# Patient Record
Sex: MALE | Race: WHITE | Employment: STUDENT | URBAN - METROPOLITAN AREA
[De-identification: names, ages, dates, MRNs, and addresses within clinical notes are randomized per-mention and may not be internally consistent; named-entity substitution may affect disease eponyms.]

---

## 2017-01-24 ENCOUNTER — APPOINTMENT (OUTPATIENT)
Dept: ULTRASOUND IMAGING | Age: 22
End: 2017-01-24
Attending: EMERGENCY MEDICINE
Payer: MEDICAID

## 2017-01-24 ENCOUNTER — APPOINTMENT (OUTPATIENT)
Dept: GENERAL RADIOLOGY | Age: 22
End: 2017-01-24
Attending: EMERGENCY MEDICINE
Payer: MEDICAID

## 2017-01-24 ENCOUNTER — HOSPITAL ENCOUNTER (EMERGENCY)
Age: 22
Discharge: HOME | End: 2017-01-24
Attending: EMERGENCY MEDICINE
Payer: MEDICAID

## 2017-01-24 VITALS
BODY MASS INDEX: 29.52 KG/M2 | DIASTOLIC BLOOD PRESSURE: 65 MMHG | TEMPERATURE: 99 F | RESPIRATION RATE: 16 BRPM | OXYGEN SATURATION: 98 % | SYSTOLIC BLOOD PRESSURE: 128 MMHG | HEIGHT: 74 IN | HEART RATE: 78 BPM | WEIGHT: 230 LBS

## 2017-01-24 DIAGNOSIS — R30.0 DYSURIA: Primary | ICD-10-CM

## 2017-01-24 LAB
BILIRUB UR QL STRIP.AUTO: NEGATIVE
CLARITY UR REFRACT.AUTO: CLEAR
COLOR UR AUTO: YELLOW
GLUCOSE BLD-MCNC: 103 MG/DL (ref 65–99)
GLUCOSE UR STRIP.AUTO-MCNC: NEGATIVE MG/DL
KETONES UR STRIP.AUTO-MCNC: NEGATIVE MG/DL
LEUKOCYTE ESTERASE UR QL STRIP.AUTO: NEGATIVE
NITRITE UR QL STRIP.AUTO: NEGATIVE
PH UR STRIP.AUTO: 7 [PH] (ref 4.5–8)
RBC UR QL AUTO: NEGATIVE
SP GR UR STRIP.AUTO: 1.02 (ref 1–1.03)
UROBILINOGEN UR STRIP.AUTO-MCNC: 0.2 MG/DL

## 2017-01-24 PROCEDURE — 87591 N.GONORRHOEAE DNA AMP PROB: CPT | Performed by: EMERGENCY MEDICINE

## 2017-01-24 PROCEDURE — 99283 EMERGENCY DEPT VISIT LOW MDM: CPT

## 2017-01-24 PROCEDURE — 81001 URINALYSIS AUTO W/SCOPE: CPT | Performed by: EMERGENCY MEDICINE

## 2017-01-24 PROCEDURE — 76770 US EXAM ABDO BACK WALL COMP: CPT

## 2017-01-24 PROCEDURE — 87491 CHLMYD TRACH DNA AMP PROBE: CPT | Performed by: EMERGENCY MEDICINE

## 2017-01-24 PROCEDURE — 96372 THER/PROPH/DIAG INJ SC/IM: CPT

## 2017-01-24 PROCEDURE — 74000 XR ABDOMEN (KUB) (1 AP VIEW)  (CPT=74000): CPT

## 2017-01-24 PROCEDURE — 82962 GLUCOSE BLOOD TEST: CPT

## 2017-01-24 RX ORDER — CEFTRIAXONE SODIUM 250 MG/1
250 INJECTION, POWDER, FOR SOLUTION INTRAMUSCULAR; INTRAVENOUS ONCE
Status: COMPLETED | OUTPATIENT
Start: 2017-01-24 | End: 2017-01-24

## 2017-01-24 RX ORDER — AZITHROMYCIN 250 MG/1
1000 TABLET, FILM COATED ORAL ONCE
Status: COMPLETED | OUTPATIENT
Start: 2017-01-24 | End: 2017-01-24

## 2017-01-24 NOTE — ED PROVIDER NOTES
Patient Seen in: THE [facility] Emergency Department In [place]    History   Patient presents with:  Urinary Symptoms (urologic)    Stated Complaint: urinary symptoms x 3-4 days    HPI    53-year-old male presents to the emergency department with a variety of 1610 18   Temp 01/24/17 1610 98.6 °F (37 °C)   Temp src 01/24/17 1610 Temporal   SpO2 01/24/17 1610 99 %   O2 Device 01/24/17 1610 None (Room air)       Current:/65 mmHg  Pulse 78  Temp(Src) 98.6 °F (37 °C) (Temporal)  Resp 16  Ht 188 cm (6' 2\")  Wt MD  [address] [address]  [phone]    Schedule an appointment as soon as possible for a visit        Medications Prescribed:  Discharge Medication List as of 1/24/2017  5:40 PM

## 2017-01-24 NOTE — ED INITIAL ASSESSMENT (HPI)
Pt here for flu like sx and painful urination. Pt denies discharge. States it difficult to starting and stopping his void.

## 2017-01-26 LAB
C TRACH DNA SPEC QL NAA+PROBE: NEGATIVE
N GONORRHOEA DNA SPEC QL NAA+PROBE: NEGATIVE

## 2017-03-14 ENCOUNTER — HOSPITAL ENCOUNTER (EMERGENCY)
Age: 22
Discharge: HOME | End: 2017-03-14
Payer: MEDICAID

## 2017-03-14 VITALS
TEMPERATURE: 99 F | HEART RATE: 54 BPM | DIASTOLIC BLOOD PRESSURE: 62 MMHG | RESPIRATION RATE: 18 BRPM | HEIGHT: 74 IN | BODY MASS INDEX: 29.52 KG/M2 | WEIGHT: 230 LBS | SYSTOLIC BLOOD PRESSURE: 137 MMHG

## 2017-03-14 DIAGNOSIS — H65.92 LEFT OTITIS MEDIA WITH EFFUSION: Primary | ICD-10-CM

## 2017-03-14 PROCEDURE — 99283 EMERGENCY DEPT VISIT LOW MDM: CPT

## 2017-03-14 RX ORDER — AMOXICILLIN 875 MG/1
875 TABLET, COATED ORAL 2 TIMES DAILY
Qty: 20 TABLET | Refills: 0 | Status: SHIPPED | OUTPATIENT
Start: 2017-03-14 | End: 2017-03-24

## 2017-03-14 RX ORDER — FLUTICASONE PROPIONATE 50 MCG
2 SPRAY, SUSPENSION (ML) NASAL DAILY
Qty: 16 G | Refills: 0 | Status: SHIPPED | OUTPATIENT
Start: 2017-03-14 | End: 2017-04-13

## 2017-03-14 NOTE — ED PROVIDER NOTES
Patient Seen in: THE [facility]ICAL [facility] Emergency Department In [place]    History   Patient presents with:  Ear Problem Pain (neurosensory)    Stated Complaint: left ear pain, sinus congestion    HPI  CHIEF COMPLAINT: Left ear pain, nasal congestion    HISTORY OF HI systems are as noted in HPI. Constitutional and vital signs reviewed. All other systems reviewed and negative except as noted above. PSFH elements reviewed from today and agreed except as otherwise stated in HPI.     Physical Exam     ED Triage Vit questions prior to discharge.         Disposition and Plan     Clinical Impression:  Left otitis media with effusion  (primary encounter diagnosis)    Disposition:  Discharge    Follow-up:  Roselyn Pacheco    In 1 week  For reevaluation      Medications Presc

## 2017-04-10 ENCOUNTER — HOSPITAL ENCOUNTER (EMERGENCY)
Age: 22
Discharge: HOME | End: 2017-04-10
Payer: MEDICAID

## 2017-04-10 ENCOUNTER — APPOINTMENT (OUTPATIENT)
Dept: GENERAL RADIOLOGY | Age: 22
End: 2017-04-10
Attending: PHYSICIAN ASSISTANT
Payer: MEDICAID

## 2017-04-10 VITALS
DIASTOLIC BLOOD PRESSURE: 73 MMHG | SYSTOLIC BLOOD PRESSURE: 137 MMHG | OXYGEN SATURATION: 96 % | HEART RATE: 82 BPM | BODY MASS INDEX: 31.81 KG/M2 | RESPIRATION RATE: 17 BRPM | HEIGHT: 73 IN | WEIGHT: 240 LBS | TEMPERATURE: 99 F

## 2017-04-10 DIAGNOSIS — J11.1 INFLUENZA: Primary | ICD-10-CM

## 2017-04-10 PROCEDURE — 87081 CULTURE SCREEN ONLY: CPT | Performed by: PHYSICIAN ASSISTANT

## 2017-04-10 PROCEDURE — 99283 EMERGENCY DEPT VISIT LOW MDM: CPT | Performed by: PHYSICIAN ASSISTANT

## 2017-04-10 PROCEDURE — 71020 XR CHEST PA + LAT CHEST (CPT=71020): CPT

## 2017-04-10 PROCEDURE — 86403 PARTICLE AGGLUT ANTBDY SCRN: CPT | Performed by: PHYSICIAN ASSISTANT

## 2017-04-10 PROCEDURE — 87430 STREP A AG IA: CPT | Performed by: PHYSICIAN ASSISTANT

## 2017-04-10 RX ORDER — IBUPROFEN 600 MG/1
600 TABLET ORAL ONCE
Status: COMPLETED | OUTPATIENT
Start: 2017-04-10 | End: 2017-04-10

## 2017-04-10 RX ORDER — CODEINE PHOSPHATE AND GUAIFENESIN 10; 100 MG/5ML; MG/5ML
10 SOLUTION ORAL NIGHTLY PRN
Qty: 118 ML | Refills: 0 | Status: SHIPPED | OUTPATIENT
Start: 2017-04-10 | End: 2019-01-14

## 2017-04-10 NOTE — ED PROVIDER NOTES
Patient Seen in: THE [facility] Emergency Department In [place]    History   Patient presents with:  Sore Throat  Cough/URI    Stated Complaint: Sore thoat, cough, bodyaches x 2 days    HPI    Patient is a pleasant 17-year-old male.   Patient arrives for evalu systems reviewed and negative except as noted above. PSFH elements reviewed from today and agreed except as otherwise stated in HPI.     Physical Exam       ED Triage Vitals   BP 04/10/17 1825 137/73 mmHg   Pulse 04/10/17 1825 82   Resp 04/10/17 1825 17 silhouette. MEDIASTINUM:  Normal. PLEURA:  Normal.  No pleural effusions. BONES:  Mild dextroscoliosis. 4/10/2017  CONCLUSION:  No acute disease. Dictated by: Juan Berg MD on 4/10/2017 at 18:55     Approved by:  Juan Berg MD            MDM

## 2017-04-10 NOTE — ED NOTES
Pt c/o sore throat, cough, body aches for the past few days. Pt states he was taking antibiotics and just stopped them one week ago.

## 2017-05-23 NOTE — ED NOTES
Patient is resting comfortably. Denies c/o CP.  States only has pain/bryon during panic attack, denies panic at dc

## 2017-05-23 NOTE — ED INITIAL ASSESSMENT (HPI)
States father passed away a couple weeks ago, ran out of Xanax. Cant see MD until 6/1 d/t insurance.  States no sleeping, hx of panic attacks

## 2017-05-23 NOTE — ED PROVIDER NOTES
Patient Seen in: THE [facility] Emergency Department In [place]    History   Patient presents with:   Anxiety/Panic attack (neurologic)    Stated Complaint: Panic attack/anxiery- pt sts he ran out of medication    HPI    24-year-old male who has a history of a reviewed from today and agreed except as otherwise stated in HPI.     Physical Exam       ED Triage Vitals   BP 05/22/17 2258 145/60 mmHg   Pulse 05/22/17 2258 110   Resp 05/22/17 2258 20   Temp 05/22/17 2258 98 °F (36.7 °C)   Temp src 05/22/17 2258 Oral negative predictive value of approximately 95% when results are used as part of the total clinical evaluation of the patient. CBC WITH DIFFERENTIAL WITH PLATELET    Narrative:      The following orders were created for panel order CBC WITH DIFFERENTIAL WI Andrade Harsh      As needed, If symptoms worsen      Medications Prescribed:  Current Discharge Medication List    START taking these medications    !! ALPRAZolam 0.25 MG Oral Tab  Take 1 tablet (0.25 mg total) by mouth 3 (three) times daily as needed for Anxiety

## 2017-10-12 ENCOUNTER — HOSPITAL (OUTPATIENT)
Dept: OTHER | Age: 22
End: 2017-10-12
Attending: EMERGENCY MEDICINE

## 2017-12-14 ENCOUNTER — HOSPITAL ENCOUNTER (EMERGENCY)
Age: 22
Discharge: HOME | End: 2017-12-15
Attending: EMERGENCY MEDICINE
Payer: MEDICAID

## 2017-12-14 VITALS
HEIGHT: 73 IN | OXYGEN SATURATION: 99 % | RESPIRATION RATE: 18 BRPM | HEART RATE: 88 BPM | DIASTOLIC BLOOD PRESSURE: 52 MMHG | TEMPERATURE: 99 F | WEIGHT: 240 LBS | SYSTOLIC BLOOD PRESSURE: 128 MMHG | BODY MASS INDEX: 31.81 KG/M2

## 2017-12-14 DIAGNOSIS — M77.9 TENDINITIS: Primary | ICD-10-CM

## 2017-12-14 PROCEDURE — 99283 EMERGENCY DEPT VISIT LOW MDM: CPT

## 2017-12-14 PROCEDURE — 96372 THER/PROPH/DIAG INJ SC/IM: CPT

## 2017-12-14 RX ORDER — KETOROLAC TROMETHAMINE 10 MG/1
10 TABLET, FILM COATED ORAL EVERY 6 HOURS PRN
Qty: 30 TABLET | Refills: 0 | Status: SHIPPED | OUTPATIENT
Start: 2017-12-14 | End: 2017-12-21

## 2017-12-14 RX ORDER — KETOROLAC TROMETHAMINE 30 MG/ML
60 INJECTION, SOLUTION INTRAMUSCULAR; INTRAVENOUS ONCE
Status: COMPLETED | OUTPATIENT
Start: 2017-12-14 | End: 2017-12-15

## 2017-12-15 NOTE — ED INITIAL ASSESSMENT (HPI)
Pt to ed states he feels like his joints are on fire states he lifted weights earlier today and went heavier on the weights and has been experiencing pain pt states he has taken a couple doses of ibuprofen without much relief reports leaving work tonoc to

## 2017-12-15 NOTE — ED PROVIDER NOTES
Patient Seen in: [facility] In [place]    History   Patient presents with:  Upper Extremity Injury (musculoskeletal)    Stated Complaint: right and left arm pain    HPI    Patient complains of bilateral bicep pain since waking out ear Neck: Normal range of motion. Neck supple. Cardiovascular: Normal rate and intact distal pulses. Pulmonary/Chest: Effort normal. No respiratory distress. Abdominal: Soft. He exhibits no distension. There is no tenderness.    Musculoskeletal: Normal

## 2019-01-14 ENCOUNTER — APPOINTMENT (OUTPATIENT)
Dept: ULTRASOUND IMAGING | Age: 24
End: 2019-01-14
Attending: EMERGENCY MEDICINE

## 2019-01-14 ENCOUNTER — HOSPITAL ENCOUNTER (EMERGENCY)
Age: 24
Discharge: HOME | End: 2019-01-14
Attending: EMERGENCY MEDICINE

## 2019-01-14 VITALS
HEART RATE: 77 BPM | SYSTOLIC BLOOD PRESSURE: 133 MMHG | DIASTOLIC BLOOD PRESSURE: 89 MMHG | RESPIRATION RATE: 17 BRPM | TEMPERATURE: 99 F | OXYGEN SATURATION: 97 % | WEIGHT: 265 LBS | BODY MASS INDEX: 35 KG/M2

## 2019-01-14 DIAGNOSIS — I86.1 VARICOCELE: Primary | ICD-10-CM

## 2019-01-14 LAB
BILIRUB UR QL STRIP.AUTO: NEGATIVE
CLARITY UR REFRACT.AUTO: CLEAR
COLOR UR AUTO: YELLOW
GLUCOSE UR STRIP.AUTO-MCNC: NEGATIVE MG/DL
KETONES UR STRIP.AUTO-MCNC: NEGATIVE MG/DL
LEUKOCYTE ESTERASE UR QL STRIP.AUTO: NEGATIVE
NITRITE UR QL STRIP.AUTO: NEGATIVE
PH UR STRIP.AUTO: 7 [PH] (ref 4.5–8)
PROT UR STRIP.AUTO-MCNC: NEGATIVE MG/DL
RBC UR QL AUTO: NEGATIVE
SP GR UR STRIP.AUTO: 1.02 (ref 1–1.03)
UROBILINOGEN UR STRIP.AUTO-MCNC: 0.2 MG/DL

## 2019-01-14 PROCEDURE — 93975 VASCULAR STUDY: CPT | Performed by: EMERGENCY MEDICINE

## 2019-01-14 PROCEDURE — 81003 URINALYSIS AUTO W/O SCOPE: CPT | Performed by: EMERGENCY MEDICINE

## 2019-01-14 PROCEDURE — 99284 EMERGENCY DEPT VISIT MOD MDM: CPT

## 2019-01-14 PROCEDURE — 76870 US EXAM SCROTUM: CPT | Performed by: EMERGENCY MEDICINE

## 2019-01-14 NOTE — ED PROVIDER NOTES
Patient Seen in: THE [facility] Emergency Department In [place]    History   Patient presents with:  Zenaida-G (gynecologic)    Stated Complaint: evshawn SOL    HPI    27-year-old male presents for evaluation of a bump to his right testicle.   Patient has noted the bu Circumcised. Lymphadenopathy: No inguinal adenopathy noted on the right side. General: Alert, oriented, no apparent distress  Neck: Supple  Lungs: Clear to auscultation bilaterally. Heart: Regular rate and rhythm. Abdomen: Soft, nontender.    Ski diagnosis)    Disposition:  There is no disposition on file for this visit. There is no disposition time on file for this visit.     Follow-up:  MD Georgi [address] [facility]  [phone]    Schedule an appointme

## 2019-01-14 NOTE — ED INITIAL ASSESSMENT (HPI)
States he's had right testicular pain for a couple of yrs and occasional painful urination. No injury. Also has a rash to groin.

## 2019-05-25 ENCOUNTER — HOSPITAL (OUTPATIENT)
Dept: OTHER | Age: 24
End: 2019-05-25
Attending: UROLOGY

## 2019-05-25 LAB
AMORPH SED URNS QL MICRO: ABNORMAL
APPEARANCE UR: CLEAR
BILIRUB UR QL: NEGATIVE
CAOX CRY URNS QL MICRO: ABNORMAL
COLOR UR: ABNORMAL
EPITH CASTS #/AREA URNS LPF: ABNORMAL /[LPF]
FATTY CASTS #/AREA URNS LPF: ABNORMAL /[LPF]
GLUCOSE UR-MCNC: NEGATIVE MG/DL
GRAN CASTS #/AREA URNS LPF: ABNORMAL /[LPF]
HGB UR QL: NEGATIVE
HYALINE CASTS #/AREA URNS LPF: ABNORMAL /[LPF]
KETONES UR-MCNC: NEGATIVE MG/DL
LEUKOCYTE ESTERASE UR QL STRIP: NEGATIVE
MICROSCOPIC (MT): ABNORMAL
MIXED CELL CASTS #/AREA URNS LPF: ABNORMAL /[LPF]
MUCOUS THREADS URNS QL MICRO: ABNORMAL
NITRITE UR QL: NEGATIVE
PH UR: 6 UNITS (ref 5–7)
PROT UR QL: NEGATIVE MG/DL
RBC CASTS #/AREA URNS LPF: ABNORMAL /[LPF]
RENAL EPI CELLS #/AREA URNS HPF: ABNORMAL /[HPF]
SP GR UR: 1.01 (ref 1–1.03)
SPECIMEN SOURCE: ABNORMAL
SPERM URNS QL MICRO: ABNORMAL
T VAGINALIS URNS QL MICRO: ABNORMAL
TRI-PHOS CRY URNS QL MICRO: ABNORMAL
URATE CRY URNS QL MICRO: ABNORMAL
URNS CMNT MICRO: ABNORMAL
UROBILINOGEN UR QL: 0.2 MG/DL (ref 0–1)
WAXY CASTS #/AREA URNS LPF: ABNORMAL /[LPF]
WBC CASTS #/AREA URNS LPF: ABNORMAL /[LPF]
YEAST HYPHAE URNS QL MICRO: ABNORMAL
YEAST URNS QL MICRO: ABNORMAL

## 2019-05-26 LAB
ANNOTATION COMMENT IMP: NORMAL
HBV CORE IGG+IGM SER QL: NEGATIVE
HBV SURFACE AB SER QL: NEGATIVE
HBV SURFACE AG SER QL: NEGATIVE
HCV AB SER QL: NEGATIVE
HIV 1+2 AB+HIV1 P24 AG SERPL QL IA: NONREACTIVE
RPR SER QL: NONREACTIVE
RPR SER QL: NORMAL

## 2019-05-27 LAB
HSV1 IGG SERPL QL IA: NEGATIVE
HSV2 IGG SERPL QL IA: NEGATIVE

## 2019-05-28 LAB
C TRACH RRNA SPEC QL NAA+PROBE: NEGATIVE
C TRACH RRNA SPEC QL NAA+PROBE: NORMAL
N GONORRHOEA RRNA SPEC QL NAA+PROBE: NEGATIVE
N GONORRHOEA RRNA SPEC QL NAA+PROBE: NORMAL
SPECIMEN SOURCE: NORMAL

## 2019-05-31 LAB
TESTOST FREE SERPL-MCNC: 152.8 PG/ML
TESTOST FREE SERPL-MCNC: NORMAL PG/ML
TESTOST SERPL-MCNC: 445.9 NG/DL
TESTOST SERPL-MCNC: NORMAL NG/DL

## 2019-09-11 ENCOUNTER — HOSPITAL (OUTPATIENT)
Dept: OTHER | Age: 24
End: 2019-09-11
Attending: UROLOGY

## 2020-04-30 ENCOUNTER — HOSPITAL ENCOUNTER (EMERGENCY)
Age: 25
Discharge: HOME | End: 2020-04-30
Attending: EMERGENCY MEDICINE

## 2020-04-30 VITALS
TEMPERATURE: 98.1 F | DIASTOLIC BLOOD PRESSURE: 98 MMHG | OXYGEN SATURATION: 99 % | SYSTOLIC BLOOD PRESSURE: 158 MMHG | HEART RATE: 93 BPM | WEIGHT: 262.57 LBS | RESPIRATION RATE: 18 BRPM

## 2020-04-30 DIAGNOSIS — B35.6 TINEA CRURIS: Primary | ICD-10-CM

## 2020-04-30 LAB
APPEARANCE UR: CLEAR
BILIRUB UR QL STRIP: NEGATIVE
COLOR UR: YELLOW
GLUCOSE UR STRIP-MCNC: NEGATIVE MG/DL
HGB UR QL STRIP: NEGATIVE
KETONES UR STRIP-MCNC: NEGATIVE MG/DL
LEUKOCYTE ESTERASE UR QL STRIP: NEGATIVE
NITRITE UR QL STRIP: NEGATIVE
PH UR STRIP: 7 UNITS (ref 5–7)
PROT UR STRIP-MCNC: NEGATIVE MG/DL
SP GR UR STRIP: 1.02 (ref 1–1.03)
UROBILINOGEN UR STRIP-MCNC: 0.2 MG/DL (ref 0–1)

## 2020-04-30 PROCEDURE — 81003 URINALYSIS AUTO W/O SCOPE: CPT

## 2020-04-30 PROCEDURE — 99283 EMERGENCY DEPT VISIT LOW MDM: CPT

## 2020-04-30 RX ORDER — CLOTRIMAZOLE 1 %
CREAM (GRAM) TOPICAL 2 TIMES DAILY
Qty: 40 G | Refills: 2 | Status: SHIPPED | OUTPATIENT
Start: 2020-04-30 | End: 2020-05-14

## 2020-04-30 ASSESSMENT — ENCOUNTER SYMPTOMS
ABDOMINAL PAIN: 0
FEVER: 0

## 2020-05-06 ENCOUNTER — HOSPITAL ENCOUNTER (EMERGENCY)
Age: 25
Discharge: HOME | End: 2020-05-06
Attending: EMERGENCY MEDICINE
Payer: MEDICAID

## 2020-05-06 ENCOUNTER — APPOINTMENT (OUTPATIENT)
Dept: GENERAL RADIOLOGY | Age: 25
End: 2020-05-06
Attending: EMERGENCY MEDICINE
Payer: MEDICAID

## 2020-05-06 VITALS
TEMPERATURE: 99 F | HEIGHT: 74 IN | OXYGEN SATURATION: 97 % | WEIGHT: 255 LBS | SYSTOLIC BLOOD PRESSURE: 147 MMHG | RESPIRATION RATE: 16 BRPM | BODY MASS INDEX: 32.73 KG/M2 | HEART RATE: 103 BPM | DIASTOLIC BLOOD PRESSURE: 80 MMHG

## 2020-05-06 DIAGNOSIS — R05.9 COUGH: ICD-10-CM

## 2020-05-06 DIAGNOSIS — R06.2 WHEEZING: Primary | ICD-10-CM

## 2020-05-06 PROCEDURE — 99284 EMERGENCY DEPT VISIT MOD MDM: CPT

## 2020-05-06 PROCEDURE — 99283 EMERGENCY DEPT VISIT LOW MDM: CPT

## 2020-05-06 PROCEDURE — 71045 X-RAY EXAM CHEST 1 VIEW: CPT | Performed by: EMERGENCY MEDICINE

## 2020-05-06 RX ORDER — ALBUTEROL SULFATE 90 UG/1
2 AEROSOL, METERED RESPIRATORY (INHALATION) EVERY 4 HOURS PRN
Qty: 1 INHALER | Refills: 0 | Status: SHIPPED | OUTPATIENT
Start: 2020-05-06 | End: 2020-06-05

## 2020-05-06 RX ORDER — METHYLPREDNISOLONE 4 MG/1
TABLET ORAL
Qty: 1 PACKAGE | Refills: 0 | Status: SHIPPED | OUTPATIENT
Start: 2020-05-06 | End: 2020-06-11 | Stop reason: ALTCHOICE

## 2020-05-06 NOTE — ED INITIAL ASSESSMENT (HPI)
Pt states he has had a dry cough for about 2 mos and diarrhea for past couple days.  No known fever no abd pain

## 2020-05-06 NOTE — ED PROVIDER NOTES
Patient Seen in: THE [facility] Emergency Department In [place]      History   Patient presents with:  Dyspnea DEBBY SOB    Stated Complaint: dry cough x 1 month, sob, diarrhea     HPI    27-year-old male presents emergency department states he had a dry cough the posterior pharynx  Neck: Supple no JVD no lymphadenopathy no meningismus no carotid bruit  CV: Regular rate and rhythm no murmur rub  Respiratory: Clear to auscultation bilaterally no crackles no wheezes no accessory muscle use  Abdomen: Soft nontender to take medications as prescribed. Patient is aware that they are to return to ED if any worsening problems. Patient was also instructed to followup with the appropriate physician. Patient verbalizes and agrees with plan.   Patient discharged in good con

## 2020-05-18 ENCOUNTER — HOSPITAL ENCOUNTER (INPATIENT)
Age: 25
LOS: 2 days | Discharge: HOME | DRG: 137 | End: 2020-05-21
Attending: EMERGENCY MEDICINE | Admitting: HOSPITALIST

## 2020-05-18 ENCOUNTER — APPOINTMENT (OUTPATIENT)
Dept: GENERAL RADIOLOGY | Age: 25
DRG: 137 | End: 2020-05-18
Attending: EMERGENCY MEDICINE

## 2020-05-18 DIAGNOSIS — E87.6 HYPOKALEMIA: ICD-10-CM

## 2020-05-18 DIAGNOSIS — J18.9 PNEUMONIA OF BOTH LUNGS DUE TO INFECTIOUS ORGANISM, UNSPECIFIED PART OF LUNG: Primary | ICD-10-CM

## 2020-05-18 LAB
ALBUMIN SERPL-MCNC: 3.1 G/DL (ref 3.6–5.1)
ALBUMIN/GLOB SERPL: 0.6 {RATIO} (ref 1–2.4)
ALP SERPL-CCNC: 136 UNITS/L (ref 45–117)
ALT SERPL-CCNC: 177 UNITS/L
ANION GAP SERPL CALC-SCNC: 15 MMOL/L (ref 10–20)
AST SERPL-CCNC: 96 UNITS/L
BASOPHILS # BLD: 0.1 K/MCL (ref 0–0.3)
BASOPHILS NFR BLD: 1 %
BILIRUB SERPL-MCNC: 0.8 MG/DL (ref 0.2–1)
BUN SERPL-MCNC: 21 MG/DL (ref 6–20)
BUN/CREAT SERPL: 29 (ref 7–25)
CALCIUM SERPL-MCNC: 9.3 MG/DL (ref 8.4–10.2)
CHLORIDE SERPL-SCNC: 114 MMOL/L (ref 98–107)
CO2 SERPL-SCNC: 20 MMOL/L (ref 21–32)
CREAT SERPL-MCNC: 0.71 MG/DL (ref 0.67–1.17)
DIFFERENTIAL METHOD BLD: ABNORMAL
EOSINOPHIL # BLD: 0.2 K/MCL (ref 0.1–0.5)
EOSINOPHIL NFR BLD: 1 %
ERYTHROCYTE [DISTWIDTH] IN BLOOD: 13.1 % (ref 11–15)
GLOBULIN SER-MCNC: 5.1 G/DL (ref 2–4)
GLUCOSE SERPL-MCNC: 115 MG/DL (ref 65–99)
HCT VFR BLD CALC: 38.8 % (ref 39–51)
HGB BLD-MCNC: 13.1 G/DL (ref 13–17)
IMM GRANULOCYTES # BLD AUTO: 0.3 K/MCL (ref 0–0.2)
IMM GRANULOCYTES NFR BLD: 3 %
LACTATE BLDV-MCNC: 0.9 MMOL/L
LYMPHOCYTES # BLD: 2.5 K/MCL (ref 1–4.8)
LYMPHOCYTES NFR BLD: 22 %
MCH RBC QN AUTO: 30.3 PG (ref 26–34)
MCHC RBC AUTO-ENTMCNC: 33.8 G/DL (ref 32–36.5)
MCV RBC AUTO: 89.6 FL (ref 78–100)
MONOCYTES # BLD: 1.1 K/MCL (ref 0.3–0.9)
MONOCYTES NFR BLD: 9 %
NEUTROPHILS # BLD: 7.4 K/MCL (ref 1.8–7.7)
NEUTROPHILS NFR BLD: 64 %
NRBC BLD MANUAL-RTO: 0 /100 WBC
PLATELET # BLD: 407 K/MCL (ref 140–450)
POTASSIUM SERPL-SCNC: 3.3 MMOL/L (ref 3.4–5.1)
PROT SERPL-MCNC: 8.2 G/DL (ref 6.4–8.2)
RBC # BLD: 4.33 MIL/MCL (ref 4.5–5.9)
SODIUM SERPL-SCNC: 146 MMOL/L (ref 135–145)
WBC # BLD: 11.5 K/MCL (ref 4.2–11)

## 2020-05-18 PROCEDURE — 71045 X-RAY EXAM CHEST 1 VIEW: CPT

## 2020-05-18 PROCEDURE — 96365 THER/PROPH/DIAG IV INF INIT: CPT

## 2020-05-18 PROCEDURE — 80053 COMPREHEN METABOLIC PANEL: CPT

## 2020-05-18 PROCEDURE — 10002807 HB RX 258: Performed by: EMERGENCY MEDICINE

## 2020-05-18 PROCEDURE — 87635 SARS-COV-2 COVID-19 AMP PRB: CPT

## 2020-05-18 PROCEDURE — 83605 ASSAY OF LACTIC ACID: CPT

## 2020-05-18 PROCEDURE — G0378 HOSPITAL OBSERVATION PER HR: HCPCS

## 2020-05-18 PROCEDURE — 10002800 HB RX 250 W HCPCS: Performed by: HOSPITALIST

## 2020-05-18 PROCEDURE — 96361 HYDRATE IV INFUSION ADD-ON: CPT

## 2020-05-18 PROCEDURE — 99285 EMERGENCY DEPT VISIT HI MDM: CPT

## 2020-05-18 PROCEDURE — 87040 BLOOD CULTURE FOR BACTERIA: CPT

## 2020-05-18 PROCEDURE — 10002800 HB RX 250 W HCPCS: Performed by: EMERGENCY MEDICINE

## 2020-05-18 PROCEDURE — 85025 COMPLETE CBC W/AUTO DIFF WBC: CPT

## 2020-05-18 PROCEDURE — C9803 HOPD COVID-19 SPEC COLLECT: HCPCS

## 2020-05-18 PROCEDURE — 10004281 HB COUNTER-STAFF TIME PER 15 MIN

## 2020-05-18 PROCEDURE — 96360 HYDRATION IV INFUSION INIT: CPT

## 2020-05-18 PROCEDURE — 10002803 HB RX 637: Performed by: HOSPITALIST

## 2020-05-18 PROCEDURE — 96375 TX/PRO/DX INJ NEW DRUG ADDON: CPT

## 2020-05-18 PROCEDURE — 10002803 HB RX 637: Performed by: EMERGENCY MEDICINE

## 2020-05-18 RX ORDER — LORAZEPAM 2 MG/ML
0.5 INJECTION INTRAMUSCULAR EVERY 8 HOURS PRN
Status: DISCONTINUED | OUTPATIENT
Start: 2020-05-18 | End: 2020-05-19

## 2020-05-18 RX ORDER — ENOXAPARIN SODIUM 100 MG/ML
40 INJECTION SUBCUTANEOUS DAILY
Status: DISCONTINUED | OUTPATIENT
Start: 2020-05-19 | End: 2020-05-21 | Stop reason: HOSPADM

## 2020-05-18 RX ORDER — SODIUM CHLORIDE AND POTASSIUM CHLORIDE 150; 900 MG/100ML; MG/100ML
INJECTION, SOLUTION INTRAVENOUS CONTINUOUS
Status: DISCONTINUED | OUTPATIENT
Start: 2020-05-18 | End: 2020-05-20

## 2020-05-18 RX ORDER — ACETAMINOPHEN 160 MG/5ML
325 LIQUID ORAL
Status: COMPLETED | OUTPATIENT
Start: 2020-05-18 | End: 2020-05-18

## 2020-05-18 RX ORDER — ACETAMINOPHEN 325 MG/1
650 TABLET ORAL EVERY 4 HOURS PRN
Status: DISCONTINUED | OUTPATIENT
Start: 2020-05-18 | End: 2020-05-18

## 2020-05-18 RX ORDER — POTASSIUM CHLORIDE 20 MEQ/1
40 TABLET, EXTENDED RELEASE ORAL ONCE
Status: COMPLETED | OUTPATIENT
Start: 2020-05-18 | End: 2020-05-18

## 2020-05-18 RX ORDER — AZITHROMYCIN 250 MG/1
500 TABLET, FILM COATED ORAL DAILY
Status: DISCONTINUED | OUTPATIENT
Start: 2020-05-19 | End: 2020-05-20

## 2020-05-18 RX ORDER — 0.9 % SODIUM CHLORIDE 0.9 %
2 VIAL (ML) INJECTION EVERY 12 HOURS SCHEDULED
Status: DISCONTINUED | OUTPATIENT
Start: 2020-05-18 | End: 2020-05-21 | Stop reason: HOSPADM

## 2020-05-18 RX ADMIN — SODIUM CHLORIDE AND POTASSIUM CHLORIDE 100 ML/HR: .9; .15 SOLUTION INTRAVENOUS at 22:57

## 2020-05-18 RX ADMIN — PIPERACILLIN SODIUM AND TAZOBACTAM SODIUM 4.5 G: 4; .5 INJECTION, POWDER, LYOPHILIZED, FOR SOLUTION INTRAVENOUS at 19:28

## 2020-05-18 RX ADMIN — AZITHROMYCIN DIHYDRATE 500 MG: 500 INJECTION, POWDER, LYOPHILIZED, FOR SOLUTION INTRAVENOUS at 19:59

## 2020-05-18 RX ADMIN — POTASSIUM CHLORIDE 40 MEQ: 20 TABLET, EXTENDED RELEASE ORAL at 17:38

## 2020-05-18 RX ADMIN — SODIUM CHLORIDE 1000 ML: 9 INJECTION, SOLUTION INTRAVENOUS at 17:11

## 2020-05-18 RX ADMIN — ACETAMINOPHEN ORAL SOLUTION 325 MG: 650 SOLUTION ORAL at 23:03

## 2020-05-18 SDOH — HEALTH STABILITY: MENTAL HEALTH: HOW OFTEN DO YOU HAVE A DRINK CONTAINING ALCOHOL?: NEVER

## 2020-05-18 ASSESSMENT — ACTIVITIES OF DAILY LIVING (ADL)
ADL_SHORT_OF_BREATH: NO
CHRONIC_PAIN_PRESENT: NO
ADL_SCORE: 12
RECENT_DECLINE_ADL: NO
ADL_BEFORE_ADMISSION: INDEPENDENT

## 2020-05-18 ASSESSMENT — ENCOUNTER SYMPTOMS
FACIAL SWELLING: 0
EYE DISCHARGE: 0
SHORTNESS OF BREATH: 1
POLYDIPSIA: 0
POLYPHAGIA: 0
EYE REDNESS: 0
FEVER: 1
CONFUSION: 0
ACTIVITY CHANGE: 0
NUMBNESS: 0
BRUISES/BLEEDS EASILY: 0
DIZZINESS: 0
ABDOMINAL PAIN: 0
DIARRHEA: 0
HEADACHES: 0
BACK PAIN: 0
COUGH: 1
EYE PAIN: 0
SORE THROAT: 0
NAUSEA: 0
CHILLS: 0
WHEEZING: 0
WOUND: 0
VOMITING: 0
COLOR CHANGE: 0

## 2020-05-18 ASSESSMENT — PATIENT HEALTH QUESTIONNAIRE - PHQ9
SUM OF ALL RESPONSES TO PHQ9 QUESTIONS 1 AND 2: 0
SUM OF ALL RESPONSES TO PHQ9 QUESTIONS 1 AND 2: 0
CLINICAL INTERPRETATION OF PHQ9 SCORE: NO FURTHER SCREENING NEEDED
IS PATIENT ABLE TO COMPLETE PHQ2 OR PHQ9: YES
2. FEELING DOWN, DEPRESSED OR HOPELESS: NOT AT ALL
CLINICAL INTERPRETATION OF PHQ2 SCORE: NO FURTHER SCREENING NEEDED
1. LITTLE INTEREST OR PLEASURE IN DOING THINGS: NOT AT ALL

## 2020-05-18 ASSESSMENT — LIFESTYLE VARIABLES
AUDIT-C TOTAL SCORE: 2
ALCOHOL_USE_STATUS: NO OR LOW RISK WITH VALIDATED TOOL
HOW MANY STANDARD DRINKS CONTAINING ALCOHOL DO YOU HAVE ON A TYPICAL DAY: 0,1 OR 2
HOW OFTEN DO YOU HAVE A DRINK CONTAINING ALCOHOL: 2 TO 4 TIMES PER MONTH
HOW OFTEN DO YOU HAVE 6 OR MORE DRINKS ON ONE OCCASION: NEVER

## 2020-05-18 ASSESSMENT — COGNITIVE AND FUNCTIONAL STATUS - GENERAL
ARE YOU DEAF OR DO YOU HAVE SERIOUS DIFFICULTY  HEARING: NO
ARE YOU BLIND OR DO YOU HAVE SERIOUS DIFFICULTY SEEING, EVEN WHEN WEARING GLASSES: NO

## 2020-05-18 ASSESSMENT — COLUMBIA-SUICIDE SEVERITY RATING SCALE - C-SSRS
1. WITHIN THE PAST MONTH, HAVE YOU WISHED YOU WERE DEAD OR WISHED YOU COULD GO TO SLEEP AND NOT WAKE UP?: NO
2. HAVE YOU ACTUALLY HAD ANY THOUGHTS OF KILLING YOURSELF?: NO
IS THE PATIENT ABLE TO COMPLETE C-SSRS: YES
6. HAVE YOU EVER DONE ANYTHING, STARTED TO DO ANYTHING, OR PREPARED TO DO ANYTHING TO END YOUR LIFE?: NO

## 2020-05-18 ASSESSMENT — PAIN SCALES - GENERAL: PAINLEVEL_OUTOF10: 10

## 2020-05-19 LAB
ALBUMIN SERPL-MCNC: 3 G/DL (ref 3.6–5.1)
ALBUMIN/GLOB SERPL: 0.6 {RATIO} (ref 1–2.4)
ALP SERPL-CCNC: 136 UNITS/L (ref 45–117)
ALT SERPL-CCNC: 334 UNITS/L
ANION GAP SERPL CALC-SCNC: 13 MMOL/L (ref 10–20)
AST SERPL-CCNC: 213 UNITS/L
BASOPHILS # BLD: 0.1 K/MCL (ref 0–0.3)
BASOPHILS NFR BLD: 0 %
BILIRUB SERPL-MCNC: 0.8 MG/DL (ref 0.2–1)
BUN SERPL-MCNC: 19 MG/DL (ref 6–20)
BUN/CREAT SERPL: 27 (ref 7–25)
CALCIUM SERPL-MCNC: 9.2 MG/DL (ref 8.4–10.2)
CHLORIDE SERPL-SCNC: 114 MMOL/L (ref 98–107)
CO2 SERPL-SCNC: 21 MMOL/L (ref 21–32)
CREAT SERPL-MCNC: 0.71 MG/DL (ref 0.67–1.17)
DIFFERENTIAL METHOD BLD: ABNORMAL
EOSINOPHIL # BLD: 0.2 K/MCL (ref 0.1–0.5)
EOSINOPHIL NFR BLD: 2 %
ERYTHROCYTE [DISTWIDTH] IN BLOOD: 13.1 % (ref 11–15)
GLOBULIN SER-MCNC: 4.7 G/DL (ref 2–4)
GLUCOSE SERPL-MCNC: 101 MG/DL (ref 65–99)
HCT VFR BLD CALC: 36.4 % (ref 39–51)
HGB BLD-MCNC: 12.6 G/DL (ref 13–17)
IMM GRANULOCYTES # BLD AUTO: 0.2 K/MCL (ref 0–0.2)
IMM GRANULOCYTES NFR BLD: 2 %
LYMPHOCYTES # BLD: 2.4 K/MCL (ref 1–4.8)
LYMPHOCYTES NFR BLD: 22 %
MAGNESIUM SERPL-MCNC: 2.3 MG/DL (ref 1.7–2.4)
MCH RBC QN AUTO: 30.8 PG (ref 26–34)
MCHC RBC AUTO-ENTMCNC: 34.6 G/DL (ref 32–36.5)
MCV RBC AUTO: 89 FL (ref 78–100)
MONOCYTES # BLD: 0.8 K/MCL (ref 0.3–0.9)
MONOCYTES NFR BLD: 7 %
NEUTROPHILS # BLD: 7.6 K/MCL (ref 1.8–7.7)
NEUTROPHILS NFR BLD: 67 %
NRBC BLD MANUAL-RTO: 0 /100 WBC
NT-PROBNP SERPL-MCNC: 20 PG/ML
PLATELET # BLD: 378 K/MCL (ref 140–450)
POTASSIUM SERPL-SCNC: 3.7 MMOL/L (ref 3.4–5.1)
PROCALCITONIN SERPL IA-MCNC: <0.05 NG/ML
PROT SERPL-MCNC: 7.7 G/DL (ref 6.4–8.2)
RBC # BLD: 4.09 MIL/MCL (ref 4.5–5.9)
SARS-COV-2 RNA SPEC QL NAA+PROBE: NOT DETECTED
SERVICE CMNT-IMP: NORMAL
SODIUM SERPL-SCNC: 144 MMOL/L (ref 135–145)
SPECIMEN SOURCE: NORMAL
WBC # BLD: 11.3 K/MCL (ref 4.2–11)

## 2020-05-19 PROCEDURE — 83735 ASSAY OF MAGNESIUM: CPT

## 2020-05-19 PROCEDURE — 84145 PROCALCITONIN (PCT): CPT

## 2020-05-19 PROCEDURE — 36415 COLL VENOUS BLD VENIPUNCTURE: CPT

## 2020-05-19 PROCEDURE — 10006031 HB ROOM CHARGE TELEMETRY

## 2020-05-19 PROCEDURE — 87899 AGENT NOS ASSAY W/OPTIC: CPT

## 2020-05-19 PROCEDURE — 96375 TX/PRO/DX INJ NEW DRUG ADDON: CPT

## 2020-05-19 PROCEDURE — 10004651 HB RX, NO CHARGE ITEM: Performed by: HOSPITALIST

## 2020-05-19 PROCEDURE — 96376 TX/PRO/DX INJ SAME DRUG ADON: CPT

## 2020-05-19 PROCEDURE — 92610 EVALUATE SWALLOWING FUNCTION: CPT

## 2020-05-19 PROCEDURE — 83880 ASSAY OF NATRIURETIC PEPTIDE: CPT

## 2020-05-19 PROCEDURE — 96366 THER/PROPH/DIAG IV INF ADDON: CPT

## 2020-05-19 PROCEDURE — 96361 HYDRATE IV INFUSION ADD-ON: CPT

## 2020-05-19 PROCEDURE — G0378 HOSPITAL OBSERVATION PER HR: HCPCS

## 2020-05-19 PROCEDURE — 10002800 HB RX 250 W HCPCS: Performed by: HOSPITALIST

## 2020-05-19 PROCEDURE — 80053 COMPREHEN METABOLIC PANEL: CPT

## 2020-05-19 PROCEDURE — 10002803 HB RX 637: Performed by: HOSPITALIST

## 2020-05-19 PROCEDURE — C9113 INJ PANTOPRAZOLE SODIUM, VIA: HCPCS | Performed by: HOSPITALIST

## 2020-05-19 PROCEDURE — 10002807 HB RX 258: Performed by: HOSPITALIST

## 2020-05-19 PROCEDURE — 85025 COMPLETE CBC W/AUTO DIFF WBC: CPT

## 2020-05-19 PROCEDURE — 10002803 HB RX 637

## 2020-05-19 RX ORDER — ACETAMINOPHEN 160 MG/5ML
LIQUID ORAL
Status: COMPLETED
Start: 2020-05-19 | End: 2020-05-19

## 2020-05-19 RX ORDER — LORAZEPAM 2 MG/ML
1 INJECTION INTRAMUSCULAR EVERY 8 HOURS PRN
Status: DISCONTINUED | OUTPATIENT
Start: 2020-05-19 | End: 2020-05-20

## 2020-05-19 RX ORDER — PANTOPRAZOLE SODIUM 40 MG/10ML
40 INJECTION, POWDER, LYOPHILIZED, FOR SOLUTION INTRAVENOUS EVERY 12 HOURS SCHEDULED
Status: DISCONTINUED | OUTPATIENT
Start: 2020-05-19 | End: 2020-05-20

## 2020-05-19 RX ORDER — CLOTRIMAZOLE 1 %
1 CREAM (GRAM) TOPICAL 2 TIMES DAILY
COMMUNITY
End: 2020-05-28 | Stop reason: ALTCHOICE

## 2020-05-19 RX ORDER — ALBUTEROL SULFATE 90 UG/1
2 AEROSOL, METERED RESPIRATORY (INHALATION) EVERY 4 HOURS PRN
COMMUNITY
End: 2022-11-28 | Stop reason: ALTCHOICE

## 2020-05-19 RX ORDER — ACETAMINOPHEN 325 MG/1
650 TABLET ORAL EVERY 4 HOURS PRN
Status: DISCONTINUED | OUTPATIENT
Start: 2020-05-19 | End: 2020-05-21 | Stop reason: HOSPADM

## 2020-05-19 RX ADMIN — LORAZEPAM 0.5 MG: 2 INJECTION INTRAMUSCULAR; INTRAVENOUS at 02:23

## 2020-05-19 RX ADMIN — PIPERACILLIN AND TAZOBACTAM 3.38 G: 3; .375 INJECTION, POWDER, LYOPHILIZED, FOR SOLUTION INTRAVENOUS at 22:11

## 2020-05-19 RX ADMIN — LORAZEPAM 0.5 MG: 2 INJECTION INTRAMUSCULAR; INTRAVENOUS at 09:42

## 2020-05-19 RX ADMIN — SODIUM CHLORIDE, PRESERVATIVE FREE 2 ML: 5 INJECTION INTRAVENOUS at 21:01

## 2020-05-19 RX ADMIN — LORAZEPAM 1 MG: 2 INJECTION INTRAMUSCULAR; INTRAVENOUS at 18:33

## 2020-05-19 RX ADMIN — PANTOPRAZOLE SODIUM 40 MG: 40 INJECTION, POWDER, FOR SOLUTION INTRAVENOUS at 21:01

## 2020-05-19 RX ADMIN — AZITHROMYCIN 500 MG: 250 TABLET, FILM COATED ORAL at 16:25

## 2020-05-19 RX ADMIN — PIPERACILLIN AND TAZOBACTAM 3.38 G: 3; .375 INJECTION, POWDER, LYOPHILIZED, FOR SOLUTION INTRAVENOUS at 14:49

## 2020-05-19 RX ADMIN — ACETAMINOPHEN 650 MG: 650 SOLUTION ORAL at 16:29

## 2020-05-19 RX ADMIN — SODIUM CHLORIDE AND POTASSIUM CHLORIDE 83 ML/HR: .9; .15 SOLUTION INTRAVENOUS at 22:09

## 2020-05-19 RX ADMIN — SODIUM CHLORIDE 25 ML: 0.9 INJECTION, SOLUTION INTRAVENOUS at 04:12

## 2020-05-19 RX ADMIN — PIPERACILLIN AND TAZOBACTAM 3.38 G: 3; .375 INJECTION, POWDER, LYOPHILIZED, FOR SOLUTION INTRAVENOUS at 04:13

## 2020-05-19 RX ADMIN — PANTOPRAZOLE SODIUM 40 MG: 40 INJECTION, POWDER, FOR SOLUTION INTRAVENOUS at 14:48

## 2020-05-19 ASSESSMENT — COGNITIVE AND FUNCTIONAL STATUS - GENERAL
BECAUSE OF A PHYSICAL, MENTAL, OR EMOTIONAL CONDITION, DO YOU HAVE SERIOUS DIFFICULTY CONCENTRATING, REMEMBERING OR MAKING DECISIONS: NO
DO YOU HAVE SERIOUS DIFFICULTY WALKING OR CLIMBING STAIRS: NO
DO YOU HAVE DIFFICULTY DRESSING OR BATHING: NO
BECAUSE OF A PHYSICAL, MENTAL, OR EMOTIONAL CONDITION, DO YOU HAVE DIFFICULTY DOING ERRANDS ALONE: NO

## 2020-05-19 ASSESSMENT — PAIN SCALES - GENERAL: PAINLEVEL_OUTOF10: 0

## 2020-05-20 ENCOUNTER — APPOINTMENT (OUTPATIENT)
Dept: ULTRASOUND IMAGING | Age: 25
DRG: 137 | End: 2020-05-20
Attending: HOSPITALIST

## 2020-05-20 ENCOUNTER — APPOINTMENT (OUTPATIENT)
Dept: GENERAL RADIOLOGY | Age: 25
DRG: 137 | End: 2020-05-20
Attending: HOSPITALIST

## 2020-05-20 LAB
ALBUMIN SERPL-MCNC: 2.9 G/DL (ref 3.6–5.1)
ALBUMIN/GLOB SERPL: 0.7 {RATIO} (ref 1–2.4)
ALP SERPL-CCNC: 120 UNITS/L (ref 45–117)
ALT SERPL-CCNC: 252 UNITS/L
ANION GAP SERPL CALC-SCNC: 12 MMOL/L (ref 10–20)
ANNOTATION COMMENT IMP: NORMAL
ANNOTATION COMMENT IMP: NORMAL
AST SERPL-CCNC: 75 UNITS/L
BASOPHILS # BLD: 0.1 K/MCL (ref 0–0.3)
BASOPHILS NFR BLD: 1 %
BILIRUB SERPL-MCNC: 0.6 MG/DL (ref 0.2–1)
BUN SERPL-MCNC: 17 MG/DL (ref 6–20)
BUN/CREAT SERPL: 23 (ref 7–25)
CALCIUM SERPL-MCNC: 9 MG/DL (ref 8.4–10.2)
CHLORIDE SERPL-SCNC: 113 MMOL/L (ref 98–107)
CO2 SERPL-SCNC: 22 MMOL/L (ref 21–32)
CREAT SERPL-MCNC: 0.74 MG/DL (ref 0.67–1.17)
DIFFERENTIAL METHOD BLD: ABNORMAL
EOSINOPHIL # BLD: 0.3 K/MCL (ref 0.1–0.5)
EOSINOPHIL NFR BLD: 3 %
ERYTHROCYTE [DISTWIDTH] IN BLOOD: 12.9 % (ref 11–15)
GLOBULIN SER-MCNC: 4.4 G/DL (ref 2–4)
GLUCOSE SERPL-MCNC: 101 MG/DL (ref 65–99)
HAV IGM SER QL: NEGATIVE
HBV CORE IGM SER QL: NEGATIVE
HBV SURFACE AG SER QL: NEGATIVE
HCT VFR BLD CALC: 36 % (ref 39–51)
HCV AB SER QL: NEGATIVE
HGB BLD-MCNC: 12.2 G/DL (ref 13–17)
IMM GRANULOCYTES # BLD AUTO: 0.2 K/MCL (ref 0–0.2)
IMM GRANULOCYTES NFR BLD: 2 %
INR PPP: 1.3
L PNEUMO1 AG UR QL IA: NORMAL
LYMPHOCYTES # BLD: 2.5 K/MCL (ref 1–4.8)
LYMPHOCYTES NFR BLD: 26 %
MAGNESIUM SERPL-MCNC: 2.3 MG/DL (ref 1.7–2.4)
MCH RBC QN AUTO: 30.5 PG (ref 26–34)
MCHC RBC AUTO-ENTMCNC: 33.9 G/DL (ref 32–36.5)
MCV RBC AUTO: 90 FL (ref 78–100)
MONOCYTES # BLD: 0.8 K/MCL (ref 0.3–0.9)
MONOCYTES NFR BLD: 8 %
NEUTROPHILS # BLD: 5.9 K/MCL (ref 1.8–7.7)
NEUTROPHILS NFR BLD: 60 %
NRBC BLD MANUAL-RTO: 0 /100 WBC
PHOSPHATE SERPL-MCNC: 3.9 MG/DL (ref 2.4–4.7)
PLATELET # BLD: 355 K/MCL (ref 140–450)
POTASSIUM SERPL-SCNC: 3.8 MMOL/L (ref 3.4–5.1)
PROCALCITONIN SERPL IA-MCNC: <0.05 NG/ML
PROT SERPL-MCNC: 7.3 G/DL (ref 6.4–8.2)
PROTHROMBIN TIME: 13.7 SEC (ref 9.7–11.8)
RBC # BLD: 4 MIL/MCL (ref 4.5–5.9)
REPORT STATUS (RPT): NORMAL
REPORT STATUS (RPT): NORMAL
S PNEUM AG UR QL IA.RAPID: NORMAL
SODIUM SERPL-SCNC: 143 MMOL/L (ref 135–145)
SPECIMEN SOURCE: NORMAL
SPECIMEN SOURCE: NORMAL
WBC # BLD: 9.7 K/MCL (ref 4.2–11)

## 2020-05-20 PROCEDURE — 80053 COMPREHEN METABOLIC PANEL: CPT

## 2020-05-20 PROCEDURE — 84145 PROCALCITONIN (PCT): CPT

## 2020-05-20 PROCEDURE — 84100 ASSAY OF PHOSPHORUS: CPT

## 2020-05-20 PROCEDURE — 93971 EXTREMITY STUDY: CPT

## 2020-05-20 PROCEDURE — 10006031 HB ROOM CHARGE TELEMETRY

## 2020-05-20 PROCEDURE — 92611 MOTION FLUOROSCOPY/SWALLOW: CPT

## 2020-05-20 PROCEDURE — 74230 X-RAY XM SWLNG FUNCJ C+: CPT

## 2020-05-20 PROCEDURE — 10002803 HB RX 637: Performed by: HOSPITALIST

## 2020-05-20 PROCEDURE — 85610 PROTHROMBIN TIME: CPT

## 2020-05-20 PROCEDURE — 83735 ASSAY OF MAGNESIUM: CPT

## 2020-05-20 PROCEDURE — 10002803 HB RX 637

## 2020-05-20 PROCEDURE — 10002800 HB RX 250 W HCPCS: Performed by: HOSPITALIST

## 2020-05-20 PROCEDURE — 85025 COMPLETE CBC W/AUTO DIFF WBC: CPT

## 2020-05-20 PROCEDURE — 10004651 HB RX, NO CHARGE ITEM: Performed by: HOSPITALIST

## 2020-05-20 PROCEDURE — C9113 INJ PANTOPRAZOLE SODIUM, VIA: HCPCS | Performed by: HOSPITALIST

## 2020-05-20 PROCEDURE — 10002807 HB RX 258: Performed by: HOSPITALIST

## 2020-05-20 PROCEDURE — 80074 ACUTE HEPATITIS PANEL: CPT

## 2020-05-20 PROCEDURE — 36415 COLL VENOUS BLD VENIPUNCTURE: CPT

## 2020-05-20 RX ORDER — TRAMADOL HYDROCHLORIDE 50 MG/1
50 TABLET ORAL EVERY 8 HOURS PRN
Status: COMPLETED | OUTPATIENT
Start: 2020-05-20 | End: 2020-05-21

## 2020-05-20 RX ORDER — IBUPROFEN 600 MG/1
TABLET ORAL
Status: COMPLETED
Start: 2020-05-20 | End: 2020-05-20

## 2020-05-20 RX ORDER — HYDROXYZINE HYDROCHLORIDE 25 MG/1
50 TABLET, FILM COATED ORAL EVERY 6 HOURS PRN
Status: DISCONTINUED | OUTPATIENT
Start: 2020-05-20 | End: 2020-05-21 | Stop reason: HOSPADM

## 2020-05-20 RX ORDER — PANTOPRAZOLE SODIUM 40 MG/1
40 TABLET, DELAYED RELEASE ORAL EVERY 12 HOURS SCHEDULED
Status: DISCONTINUED | OUTPATIENT
Start: 2020-05-20 | End: 2020-05-21 | Stop reason: HOSPADM

## 2020-05-20 RX ADMIN — PIPERACILLIN AND TAZOBACTAM 3.38 G: 3; .375 INJECTION, POWDER, LYOPHILIZED, FOR SOLUTION INTRAVENOUS at 14:00

## 2020-05-20 RX ADMIN — KETOROLAC TROMETHAMINE 15 MG: 15 INJECTION, SOLUTION INTRAMUSCULAR; INTRAVENOUS at 08:25

## 2020-05-20 RX ADMIN — PIPERACILLIN AND TAZOBACTAM 3.38 G: 3; .375 INJECTION, POWDER, LYOPHILIZED, FOR SOLUTION INTRAVENOUS at 05:46

## 2020-05-20 RX ADMIN — SODIUM CHLORIDE, PRESERVATIVE FREE 2 ML: 5 INJECTION INTRAVENOUS at 08:24

## 2020-05-20 RX ADMIN — PANTOPRAZOLE SODIUM 40 MG: 40 TABLET, DELAYED RELEASE ORAL at 22:11

## 2020-05-20 RX ADMIN — LORAZEPAM 1 MG: 2 INJECTION INTRAMUSCULAR; INTRAVENOUS at 02:51

## 2020-05-20 RX ADMIN — SODIUM CHLORIDE, PRESERVATIVE FREE 2 ML: 5 INJECTION INTRAVENOUS at 20:21

## 2020-05-20 RX ADMIN — LORAZEPAM 1 MG: 2 INJECTION INTRAMUSCULAR; INTRAVENOUS at 12:13

## 2020-05-20 RX ADMIN — SODIUM CHLORIDE AND POTASSIUM CHLORIDE 83 ML/HR: .9; .15 SOLUTION INTRAVENOUS at 12:19

## 2020-05-20 RX ADMIN — HYDROXYZINE HYDROCHLORIDE 50 MG: 25 TABLET ORAL at 20:20

## 2020-05-20 RX ADMIN — PANTOPRAZOLE SODIUM 40 MG: 40 INJECTION, POWDER, FOR SOLUTION INTRAVENOUS at 08:17

## 2020-05-20 RX ADMIN — PIPERACILLIN AND TAZOBACTAM 3.38 G: 3; .375 INJECTION, POWDER, LYOPHILIZED, FOR SOLUTION INTRAVENOUS at 22:10

## 2020-05-20 RX ADMIN — TRAMADOL HYDROCHLORIDE 50 MG: 50 TABLET, FILM COATED ORAL at 20:20

## 2020-05-20 RX ADMIN — ENOXAPARIN SODIUM 40 MG: 40 INJECTION SUBCUTANEOUS at 08:16

## 2020-05-20 RX ADMIN — IBUPROFEN 600 MG: 600 TABLET ORAL at 16:03

## 2020-05-20 ASSESSMENT — PAIN SCALES - GENERAL
PAINLEVEL_OUTOF10: 9
PAINLEVEL_OUTOF10: 10
PAINLEVEL_OUTOF10: 6

## 2020-05-21 ENCOUNTER — APPOINTMENT (OUTPATIENT)
Dept: CARDIOLOGY | Age: 25
DRG: 137 | End: 2020-05-21
Attending: HOSPITALIST

## 2020-05-21 VITALS
RESPIRATION RATE: 17 BRPM | BODY MASS INDEX: 30.9 KG/M2 | HEART RATE: 80 BPM | DIASTOLIC BLOOD PRESSURE: 84 MMHG | TEMPERATURE: 97.3 F | SYSTOLIC BLOOD PRESSURE: 138 MMHG | WEIGHT: 240.74 LBS | HEIGHT: 74 IN | OXYGEN SATURATION: 98 %

## 2020-05-21 PROCEDURE — 10002807 HB RX 258: Performed by: HOSPITALIST

## 2020-05-21 PROCEDURE — 96375 TX/PRO/DX INJ NEW DRUG ADDON: CPT

## 2020-05-21 PROCEDURE — 10002800 HB RX 250 W HCPCS: Performed by: HOSPITALIST

## 2020-05-21 PROCEDURE — 96376 TX/PRO/DX INJ SAME DRUG ADON: CPT

## 2020-05-21 PROCEDURE — 96366 THER/PROPH/DIAG IV INF ADDON: CPT

## 2020-05-21 PROCEDURE — 96361 HYDRATE IV INFUSION ADD-ON: CPT

## 2020-05-21 PROCEDURE — 10002803 HB RX 637: Performed by: HOSPITALIST

## 2020-05-21 PROCEDURE — 10002803 HB RX 637: Performed by: INTERNAL MEDICINE

## 2020-05-21 PROCEDURE — 93306 TTE W/DOPPLER COMPLETE: CPT

## 2020-05-21 RX ORDER — PANTOPRAZOLE SODIUM 40 MG/1
40 TABLET, DELAYED RELEASE ORAL DAILY
Qty: 30 TABLET | Refills: 0 | Status: SHIPPED | OUTPATIENT
Start: 2020-05-21 | End: 2023-01-23

## 2020-05-21 RX ORDER — ALPRAZOLAM 0.25 MG/1
0.25 TABLET ORAL ONCE
Status: COMPLETED | OUTPATIENT
Start: 2020-05-21 | End: 2020-05-21

## 2020-05-21 RX ADMIN — PANTOPRAZOLE SODIUM 40 MG: 40 TABLET, DELAYED RELEASE ORAL at 10:15

## 2020-05-21 RX ADMIN — ALPRAZOLAM 0.25 MG: 0.25 TABLET ORAL at 01:52

## 2020-05-21 RX ADMIN — PIPERACILLIN AND TAZOBACTAM 3.38 G: 3; .375 INJECTION, POWDER, LYOPHILIZED, FOR SOLUTION INTRAVENOUS at 06:25

## 2020-05-21 RX ADMIN — ENOXAPARIN SODIUM 40 MG: 40 INJECTION SUBCUTANEOUS at 10:15

## 2020-05-21 RX ADMIN — TRAMADOL HYDROCHLORIDE 50 MG: 50 TABLET, FILM COATED ORAL at 06:20

## 2020-05-21 ASSESSMENT — PAIN SCALES - WONG BAKER
WONGBAKER_NUMERICALRESPONSE: 5
WONGBAKER_NUMERICALRESPONSE: 6

## 2020-05-21 ASSESSMENT — PAIN SCALES - GENERAL
PAINLEVEL_OUTOF10: 6
PAINLEVEL_OUTOF10: 10

## 2020-05-23 LAB
BACTERIA BLD CULT: NORMAL
BACTERIA BLD CULT: NORMAL
REPORT STATUS (RPT): NORMAL
REPORT STATUS (RPT): NORMAL
SPECIMEN SOURCE: NORMAL
SPECIMEN SOURCE: NORMAL

## 2020-05-28 ENCOUNTER — HOSPITAL ENCOUNTER (EMERGENCY)
Age: 25
Discharge: HOME | End: 2020-05-28
Attending: EMERGENCY MEDICINE

## 2020-05-28 ENCOUNTER — APPOINTMENT (OUTPATIENT)
Dept: GENERAL RADIOLOGY | Age: 25
End: 2020-05-28
Attending: EMERGENCY MEDICINE

## 2020-05-28 ENCOUNTER — APPOINTMENT (OUTPATIENT)
Dept: CT IMAGING | Age: 25
End: 2020-05-28
Attending: EMERGENCY MEDICINE

## 2020-05-28 VITALS
HEIGHT: 74 IN | DIASTOLIC BLOOD PRESSURE: 89 MMHG | SYSTOLIC BLOOD PRESSURE: 137 MMHG | BODY MASS INDEX: 31.69 KG/M2 | HEART RATE: 87 BPM | TEMPERATURE: 98.3 F | RESPIRATION RATE: 16 BRPM | WEIGHT: 246.91 LBS | OXYGEN SATURATION: 98 %

## 2020-05-28 DIAGNOSIS — I80.8 SUPERFICIAL THROMBOPHLEBITIS OF RIGHT UPPER EXTREMITY: ICD-10-CM

## 2020-05-28 DIAGNOSIS — F41.9 ANXIETY: ICD-10-CM

## 2020-05-28 DIAGNOSIS — M79.601 RIGHT ARM PAIN: Primary | ICD-10-CM

## 2020-05-28 LAB
ALBUMIN SERPL-MCNC: 3.4 G/DL (ref 3.6–5.1)
ALBUMIN/GLOB SERPL: 0.9 {RATIO} (ref 1–2.4)
ALP SERPL-CCNC: 79 UNITS/L (ref 45–117)
ALT SERPL-CCNC: 93 UNITS/L
AMPHETAMINES UR QL SCN>500 NG/ML: NEGATIVE
ANION GAP SERPL CALC-SCNC: 12 MMOL/L (ref 10–20)
APPEARANCE UR: CLEAR
AST SERPL-CCNC: 40 UNITS/L
BARBITURATES UR QL SCN>200 NG/ML: NEGATIVE
BASOPHILS # BLD: 0 K/MCL (ref 0–0.3)
BASOPHILS NFR BLD: 0 %
BENZODIAZ UR QL SCN>200 NG/ML: POSITIVE
BILIRUB SERPL-MCNC: 0.3 MG/DL (ref 0.2–1)
BILIRUB UR QL STRIP: NEGATIVE
BUN SERPL-MCNC: 14 MG/DL (ref 6–20)
BUN/CREAT SERPL: 20 (ref 7–25)
BZE UR QL SCN>150 NG/ML: NEGATIVE
CALCIUM SERPL-MCNC: 8.6 MG/DL (ref 8.4–10.2)
CANNABINOIDS UR QL SCN>50 NG/ML: POSITIVE
CHLORIDE SERPL-SCNC: 104 MMOL/L (ref 98–107)
CO2 SERPL-SCNC: 25 MMOL/L (ref 21–32)
COLOR UR: ABNORMAL
CREAT SERPL-MCNC: 0.7 MG/DL (ref 0.67–1.17)
DIFFERENTIAL METHOD BLD: ABNORMAL
EOSINOPHIL # BLD: 0.2 K/MCL (ref 0.1–0.5)
EOSINOPHIL NFR BLD: 4 %
ERYTHROCYTE [DISTWIDTH] IN BLOOD: 13.3 % (ref 11–15)
ETHANOL SERPL-MCNC: NORMAL MG/DL
GLOBULIN SER-MCNC: 3.7 G/DL (ref 2–4)
GLUCOSE SERPL-MCNC: 105 MG/DL (ref 65–99)
GLUCOSE UR STRIP-MCNC: NEGATIVE MG/DL
HCT VFR BLD CALC: 39.5 % (ref 39–51)
HGB BLD-MCNC: 13.7 G/DL (ref 13–17)
HGB UR QL STRIP: NEGATIVE
IMM GRANULOCYTES # BLD AUTO: 0.1 K/MCL (ref 0–0.2)
IMM GRANULOCYTES NFR BLD: 1 %
KETONES UR STRIP-MCNC: NEGATIVE MG/DL
LACTATE BLDV-MCNC: 1.5 MMOL/L
LEUKOCYTE ESTERASE UR QL STRIP: NEGATIVE
LYMPHOCYTES # BLD: 2.3 K/MCL (ref 1–4.8)
LYMPHOCYTES NFR BLD: 36 %
MAGNESIUM SERPL-MCNC: 2.1 MG/DL (ref 1.7–2.4)
MCH RBC QN AUTO: 30.9 PG (ref 26–34)
MCHC RBC AUTO-ENTMCNC: 34.7 G/DL (ref 32–36.5)
MCV RBC AUTO: 89 FL (ref 78–100)
MONOCYTES # BLD: 0.5 K/MCL (ref 0.3–0.9)
MONOCYTES NFR BLD: 7 %
NEUTROPHILS # BLD: 3.4 K/MCL (ref 1.8–7.7)
NEUTROPHILS NFR BLD: 52 %
NITRITE UR QL STRIP: NEGATIVE
NRBC BLD MANUAL-RTO: 0 /100 WBC
OPIATES UR QL SCN>300 NG/ML: POSITIVE
PCP UR QL SCN>25 NG/ML: NEGATIVE
PH UR STRIP: 6 UNITS (ref 5–7)
PLATELET # BLD: 492 K/MCL (ref 140–450)
POTASSIUM SERPL-SCNC: 4.3 MMOL/L (ref 3.4–5.1)
PROT SERPL-MCNC: 7.1 G/DL (ref 6.4–8.2)
PROT UR STRIP-MCNC: NEGATIVE MG/DL
RBC # BLD: 4.44 MIL/MCL (ref 4.5–5.9)
SODIUM SERPL-SCNC: 137 MMOL/L (ref 135–145)
SP GR UR STRIP: 1.01 (ref 1–1.03)
SPECIMEN SOURCE: ABNORMAL
TROPONIN I SERPL HS-MCNC: <0.02 NG/ML
TSH SERPL-ACNC: 0.43 MCUNITS/ML (ref 0.35–5)
UROBILINOGEN UR STRIP-MCNC: 0.2 MG/DL (ref 0–1)
WBC # BLD: 6.5 K/MCL (ref 4.2–11)

## 2020-05-28 PROCEDURE — 10002800 HB RX 250 W HCPCS: Performed by: EMERGENCY MEDICINE

## 2020-05-28 PROCEDURE — 96374 THER/PROPH/DIAG INJ IV PUSH: CPT

## 2020-05-28 PROCEDURE — 80307 DRUG TEST PRSMV CHEM ANLYZR: CPT

## 2020-05-28 PROCEDURE — 80053 COMPREHEN METABOLIC PANEL: CPT

## 2020-05-28 PROCEDURE — 93005 ELECTROCARDIOGRAM TRACING: CPT | Performed by: EMERGENCY MEDICINE

## 2020-05-28 PROCEDURE — 10002805 HB CONTRAST AGENT: Performed by: EMERGENCY MEDICINE

## 2020-05-28 PROCEDURE — 85025 COMPLETE CBC W/AUTO DIFF WBC: CPT

## 2020-05-28 PROCEDURE — 99284 EMERGENCY DEPT VISIT MOD MDM: CPT

## 2020-05-28 PROCEDURE — 83735 ASSAY OF MAGNESIUM: CPT

## 2020-05-28 PROCEDURE — 81003 URINALYSIS AUTO W/O SCOPE: CPT

## 2020-05-28 PROCEDURE — 83605 ASSAY OF LACTIC ACID: CPT

## 2020-05-28 PROCEDURE — 84443 ASSAY THYROID STIM HORMONE: CPT

## 2020-05-28 PROCEDURE — 96375 TX/PRO/DX INJ NEW DRUG ADDON: CPT

## 2020-05-28 PROCEDURE — 71045 X-RAY EXAM CHEST 1 VIEW: CPT

## 2020-05-28 PROCEDURE — 10002807 HB RX 258: Performed by: EMERGENCY MEDICINE

## 2020-05-28 PROCEDURE — 96361 HYDRATE IV INFUSION ADD-ON: CPT

## 2020-05-28 PROCEDURE — 84484 ASSAY OF TROPONIN QUANT: CPT

## 2020-05-28 PROCEDURE — 80320 DRUG SCREEN QUANTALCOHOLS: CPT

## 2020-05-28 PROCEDURE — 71275 CT ANGIOGRAPHY CHEST: CPT

## 2020-05-28 RX ORDER — LORAZEPAM 2 MG/ML
1 INJECTION INTRAMUSCULAR ONCE
Status: COMPLETED | OUTPATIENT
Start: 2020-05-28 | End: 2020-05-28

## 2020-05-28 RX ADMIN — MORPHINE SULFATE 2 MG: 2 INJECTION, SOLUTION INTRAMUSCULAR; INTRAVENOUS at 11:32

## 2020-05-28 RX ADMIN — LORAZEPAM 1 MG: 2 INJECTION INTRAMUSCULAR; INTRAVENOUS at 11:32

## 2020-05-28 RX ADMIN — SODIUM CHLORIDE 1000 ML: 0.9 INJECTION, SOLUTION INTRAVENOUS at 11:32

## 2020-05-28 RX ADMIN — IOHEXOL 100 ML: 350 INJECTION, SOLUTION INTRAVENOUS at 12:15

## 2020-05-28 SDOH — HEALTH STABILITY: MENTAL HEALTH: HOW OFTEN DO YOU HAVE A DRINK CONTAINING ALCOHOL?: NEVER

## 2020-05-28 ASSESSMENT — PAIN SCALES - GENERAL: PAINLEVEL_OUTOF10: 8

## 2020-05-29 LAB
ATRIAL RATE (BPM): 84
P AXIS (DEGREES): 36
PR-INTERVAL (MSEC): 150
QRS-INTERVAL (MSEC): 100
QT-INTERVAL (MSEC): 366
QTC: 433
R AXIS (DEGREES): 0
REPORT TEXT: NORMAL
T AXIS (DEGREES): 25
VENTRICULAR RATE EKG/MIN (BPM): 84

## 2020-06-01 ENCOUNTER — HOSPITAL ENCOUNTER (EMERGENCY)
Facility: HOSPITAL | Age: 25
Discharge: HOME | End: 2020-06-01
Attending: EMERGENCY MEDICINE
Payer: MEDICAID

## 2020-06-01 VITALS
HEIGHT: 74 IN | WEIGHT: 250 LBS | RESPIRATION RATE: 18 BRPM | BODY MASS INDEX: 32.08 KG/M2 | HEART RATE: 96 BPM | TEMPERATURE: 98 F | OXYGEN SATURATION: 97 % | SYSTOLIC BLOOD PRESSURE: 128 MMHG | DIASTOLIC BLOOD PRESSURE: 92 MMHG

## 2020-06-01 DIAGNOSIS — G62.9 NEUROPATHY: Primary | ICD-10-CM

## 2020-06-01 PROCEDURE — 99283 EMERGENCY DEPT VISIT LOW MDM: CPT

## 2020-06-01 RX ORDER — METHYLPREDNISOLONE 4 MG/1
TABLET ORAL
Qty: 1 PACKAGE | Refills: 0 | Status: SHIPPED | OUTPATIENT
Start: 2020-06-01 | End: 2020-06-11 | Stop reason: ALTCHOICE
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
## 2020-06-11 ENCOUNTER — OFFICE VISIT (OUTPATIENT)
Dept: NEUROLOGY | Facility: CLINIC | Age: 25
End: 2020-06-11
Payer: MEDICAID

## 2020-06-11 ENCOUNTER — TELEPHONE (OUTPATIENT)
Dept: NEUROLOGY | Facility: CLINIC | Age: 25
End: 2020-06-11

## 2020-06-11 VITALS
SYSTOLIC BLOOD PRESSURE: 130 MMHG | HEART RATE: 96 BPM | RESPIRATION RATE: 20 BRPM | TEMPERATURE: 98 F | DIASTOLIC BLOOD PRESSURE: 80 MMHG

## 2020-06-11 DIAGNOSIS — G54.0 BRACHIAL PLEXOPATHY: ICD-10-CM

## 2020-06-11 DIAGNOSIS — M75.101 ROTATOR CUFF TEAR ARTHROPATHY OF RIGHT SHOULDER: ICD-10-CM

## 2020-06-11 DIAGNOSIS — M12.811 ROTATOR CUFF TEAR ARTHROPATHY OF RIGHT SHOULDER: ICD-10-CM

## 2020-06-11 DIAGNOSIS — M54.12 CERVICAL RADICULOPATHY: Primary | ICD-10-CM

## 2020-06-11 PROCEDURE — 99204 OFFICE O/P NEW MOD 45 MIN: CPT | Performed by: OTHER

## 2020-06-11 RX ORDER — GABAPENTIN 100 MG/1
100 CAPSULE ORAL 2 TIMES DAILY
Qty: 60 CAPSULE | Refills: 4 | Status: SHIPPED | OUTPATIENT
Start: 2020-06-11 | End: 2020-11-20 | Stop reason: ALTCHOICE

## 2020-06-11 NOTE — TELEPHONE ENCOUNTER
Medical records received from [facility] for recent hospitalization. Paperwork placed in provider bin for review.
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
## 2020-06-24 ENCOUNTER — OFFICE VISIT (OUTPATIENT)
Dept: SURGERY | Facility: CLINIC | Age: 25
End: 2020-06-24
Payer: MEDICAID

## 2020-06-24 VITALS
BODY MASS INDEX: 33 KG/M2 | WEIGHT: 255 LBS | SYSTOLIC BLOOD PRESSURE: 126 MMHG | DIASTOLIC BLOOD PRESSURE: 75 MMHG | HEART RATE: 83 BPM

## 2020-06-24 DIAGNOSIS — N48.89 PENILE PAIN: ICD-10-CM

## 2020-06-24 DIAGNOSIS — R35.0 URINARY FREQUENCY: ICD-10-CM

## 2020-06-24 DIAGNOSIS — R39.14 FEELING OF INCOMPLETE BLADDER EMPTYING: ICD-10-CM

## 2020-06-24 DIAGNOSIS — R30.0 DYSURIA: Primary | ICD-10-CM

## 2020-06-24 PROCEDURE — 99203 OFFICE O/P NEW LOW 30 MIN: CPT | Performed by: NURSE PRACTITIONER

## 2020-06-24 RX ORDER — DOXYCYCLINE HYCLATE 100 MG/1
100 CAPSULE ORAL 2 TIMES DAILY
Qty: 14 CAPSULE | Refills: 0 | Status: SHIPPED | OUTPATIENT
Start: 2020-06-24 | End: 2020-07-01

## 2020-06-24 NOTE — PATIENT INSTRUCTIONS
UA, urine culture, and chlamydia/gonococcus to be sent today. I will notify you of results via [employer]. US bladder, please call to schedule this  Start doxycycline 100 mg by mouth twice a day for 7 day to see if symptoms improve    Follow up in 4 weeks.

## 2020-06-24 NOTE — PROGRESS NOTES
HPI:    Patient ID: Sagar Maxwell is a 23year old male. HPI     Patient is a 23year old male who presents to the clinic for a consult regarding dysuria. Past medical history of anxiety and back pain.     Patient reports a AUA SI score today of 2 frequency, penile pain, testicular pain and urgency. Negative for hematuria, penile swelling and scrotal swelling. Musculoskeletal: Negative for gait problem. Neurological: Negative for dizziness and light-headedness.          Current Outpatient Medicat voiding dysfunction and dysuria. He complains of penile pain with urination, bowel movements, and after intercourse. Also states he has occasional rectal pain. I recommended urine today for UA, urine culture, and chlamydia/gonococcus.   I discussed dri

## 2020-06-29 ENCOUNTER — TELEPHONE (OUTPATIENT)
Dept: NEUROLOGY | Facility: CLINIC | Age: 25
End: 2020-06-29

## 2020-06-29 ENCOUNTER — PROCEDURE VISIT (OUTPATIENT)
Dept: NEUROLOGY | Facility: CLINIC | Age: 25
End: 2020-06-29
Payer: MEDICAID

## 2020-06-29 VITALS — TEMPERATURE: 98 F

## 2020-06-29 DIAGNOSIS — G54.0 BRACHIAL PLEXOPATHY: ICD-10-CM

## 2020-06-29 DIAGNOSIS — M12.811 ROTATOR CUFF TEAR ARTHROPATHY OF RIGHT SHOULDER: ICD-10-CM

## 2020-06-29 DIAGNOSIS — M54.16 CHRONIC LEFT LUMBAR RADICULOPATHY: ICD-10-CM

## 2020-06-29 DIAGNOSIS — M75.101 ROTATOR CUFF TEAR ARTHROPATHY OF RIGHT SHOULDER: ICD-10-CM

## 2020-06-29 DIAGNOSIS — M54.12 CERVICAL RADICULOPATHY: Primary | ICD-10-CM

## 2020-06-29 PROCEDURE — 95885 MUSC TST DONE W/NERV TST LIM: CPT | Performed by: OTHER

## 2020-06-29 PROCEDURE — 95910 NRV CNDJ TEST 7-8 STUDIES: CPT | Performed by: OTHER

## 2020-06-29 RX ORDER — NABUMETONE 750 MG/1
750 TABLET, FILM COATED ORAL 2 TIMES DAILY
Qty: 40 TABLET | Refills: 1 | Status: SHIPPED | OUTPATIENT
Start: 2020-06-29 | End: 2020-08-06 | Stop reason: CLARIF

## 2020-06-29 NOTE — TELEPHONE ENCOUNTER
At checkout patient clarifying order for MRI lumbar discussed at office visit. He wishes to complete at [facility].

## 2020-06-29 NOTE — PROCEDURES
[facility] with [address]  [phone]    Fax  [phone]    Electrophysiologic Consultation      Patient: Frannie Salomon    6/29/20 Median Wrist 1.70 2.20 24.5 24.6 Median - Wrist 8 47.1      Ulnar Wrist 1.50 1.90 6.1 8.9 Ulnar - Wrist 8 53.3         F  Wave      Nerve Fmin    ms   R MEDIAN 28.55   R ULNAR 29.00   R TIBIAL (KNEE) 56.30             H Reflex      Nerve H Lat    ms   L

## 2020-06-29 NOTE — PROGRESS NOTES
[id number] [facility] with [facility]  6/29/2020    10:49 AM      Here for EMG   Right arm dropping things and numb  Low back pain    Left foot numbness   Urination frequency      PTX for shoulder and low back  M

## 2020-07-23 ENCOUNTER — TELEPHONE (OUTPATIENT)
Dept: OTOLARYNGOLOGY | Facility: CLINIC | Age: 25
End: 2020-07-23

## 2020-07-23 ENCOUNTER — TELEPHONE (OUTPATIENT)
Dept: NEUROLOGY | Facility: CLINIC | Age: 25
End: 2020-07-23

## 2020-07-23 DIAGNOSIS — Z01.818 PRE-PROCEDURAL EXAMINATION: Primary | ICD-10-CM
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
## 2020-07-23 NOTE — TELEPHONE ENCOUNTER
Received auth # [id number] from [address] [place] [address], approving lumbar spine MRI from 7/13/20 to 8/12/20 for [facility]. Copy sent to scanning.

## 2020-07-23 NOTE — TELEPHONE ENCOUNTER
Pt informed me he now has Elen. He changed it about one week ago. Discussed possibility that MRI may not be covered depending on effective date and MRI date. Pt will CB with BC ID #.

## 2020-07-27 ENCOUNTER — APPOINTMENT (OUTPATIENT)
Dept: PHYSICAL THERAPY | Age: 25
End: 2020-07-27

## 2020-07-29 ENCOUNTER — APPOINTMENT (OUTPATIENT)
Dept: PHYSICAL THERAPY | Age: 25
End: 2020-07-29

## 2020-08-03 ENCOUNTER — OFFICE VISIT (OUTPATIENT)
Dept: NEUROLOGY | Facility: CLINIC | Age: 25
End: 2020-08-03
Payer: MEDICAID

## 2020-08-03 ENCOUNTER — PATIENT MESSAGE (OUTPATIENT)
Dept: NEUROLOGY | Facility: CLINIC | Age: 25
End: 2020-08-03

## 2020-08-03 VITALS
BODY MASS INDEX: 34.78 KG/M2 | TEMPERATURE: 97 F | WEIGHT: 271 LBS | HEIGHT: 74 IN | RESPIRATION RATE: 16 BRPM | SYSTOLIC BLOOD PRESSURE: 121 MMHG | HEART RATE: 76 BPM | DIASTOLIC BLOOD PRESSURE: 74 MMHG

## 2020-08-03 DIAGNOSIS — M54.16 LUMBAR RADICULOPATHY: Primary | ICD-10-CM

## 2020-08-03 DIAGNOSIS — G89.29 CHRONIC RIGHT SHOULDER PAIN: ICD-10-CM

## 2020-08-03 DIAGNOSIS — M25.511 CHRONIC RIGHT SHOULDER PAIN: ICD-10-CM

## 2020-08-03 DIAGNOSIS — R35.0 URINARY FREQUENCY: Primary | ICD-10-CM

## 2020-08-03 DIAGNOSIS — R93.6 ABNORMAL MRI, SHOULDER: ICD-10-CM

## 2020-08-03 DIAGNOSIS — N52.9 ERECTILE DYSFUNCTION, UNSPECIFIED ERECTILE DYSFUNCTION TYPE: ICD-10-CM

## 2020-08-03 PROCEDURE — 3078F DIAST BP <80 MM HG: CPT | Performed by: PHYSICIAN ASSISTANT

## 2020-08-03 PROCEDURE — 99214 OFFICE O/P EST MOD 30 MIN: CPT | Performed by: PHYSICIAN ASSISTANT

## 2020-08-03 PROCEDURE — 3008F BODY MASS INDEX DOCD: CPT | Performed by: PHYSICIAN ASSISTANT

## 2020-08-03 PROCEDURE — 3074F SYST BP LT 130 MM HG: CPT | Performed by: PHYSICIAN ASSISTANT

## 2020-08-03 NOTE — TELEPHONE ENCOUNTER
From: Zane Marie  To: Padmini Ulrich MD  Sent: 8/3/2020 12:02 PM CDT  Subject: Non-Urgent Medical Question    When working out is there certain workouts i should avoid with my back problems ?  Because i would like to work around it if possible

## 2020-08-03 NOTE — TELEPHONE ENCOUNTER
From: Zane Marie  To: THOMAS Tolbert  Sent: 8/3/2020 1:16 PM CDT  Subject: Visit Follow-up Question    Dr Bishop Chavira isn’t available until October , i spoke to somebody at scheduling and they said i need a referral in the system for the dupage urolo

## 2020-08-03 NOTE — PROGRESS NOTES
[employer] with [address]  5/13/1995  Primary Care Provider:  Paulette Razo MD    8/3/2020  Accompanied visit: Yes- Mother      23year old male patient being seen for: to insurance issues and he is now able to start. EMG(6/29/2020)  Impression:  1. No evidence of entrapment neuropathy in the right upper extremity.   2.  No evidence of any large fiber radicular process noted going on in the right upper extremi Current Outpatient Medications:   •  Nabumetone 750 MG Oral Tab, Take 1 tablet (750 mg total) by mouth 2 (two) times daily. , Disp: 40 tablet, Rfl: 1  •  gabapentin 100 MG Oral Cap, Take 1 capsule (100 mg total) by mouth 2 (two) times daily. , Disp: 60 c in about 3 months (around 11/3/2020).       Patient understands that if needed, based on condition and or test results, follow up will be readjusted      Doyle Perez PA-C  [facility]  8/3/2020, Time completed 10:21 AM

## 2020-08-04 ENCOUNTER — APPOINTMENT (OUTPATIENT)
Dept: PHYSICAL THERAPY | Age: 25
End: 2020-08-04

## 2020-08-04 ENCOUNTER — APPOINTMENT (OUTPATIENT)
Dept: LAB | Age: 25
End: 2020-08-04
Attending: Other
Payer: MEDICAID

## 2020-08-04 ENCOUNTER — TELEPHONE (OUTPATIENT)
Dept: NEUROLOGY | Facility: CLINIC | Age: 25
End: 2020-08-04

## 2020-08-04 DIAGNOSIS — G54.0 BRACHIAL PLEXOPATHY: ICD-10-CM

## 2020-08-04 LAB
CRP SERPL-MCNC: <0.29 MG/DL (ref ?–0.3)
RHEUMATOID FACT SERPL-ACNC: <10 IU/ML (ref ?–15)
SED RATE-ML: 6 MM/HR (ref 0–12)

## 2020-08-04 PROCEDURE — 86038 ANTINUCLEAR ANTIBODIES: CPT

## 2020-08-04 PROCEDURE — 86140 C-REACTIVE PROTEIN: CPT

## 2020-08-04 PROCEDURE — 86431 RHEUMATOID FACTOR QUANT: CPT

## 2020-08-04 PROCEDURE — 85652 RBC SED RATE AUTOMATED: CPT

## 2020-08-05 DIAGNOSIS — R39.14 FEELING OF INCOMPLETE BLADDER EMPTYING: Primary | ICD-10-CM

## 2020-08-05 DIAGNOSIS — G89.29 CHRONIC RIGHT SHOULDER PAIN: Primary | ICD-10-CM

## 2020-08-05 DIAGNOSIS — N52.8 OTHER MALE ERECTILE DYSFUNCTION: ICD-10-CM

## 2020-08-05 DIAGNOSIS — M25.511 CHRONIC RIGHT SHOULDER PAIN: Primary | ICD-10-CM

## 2020-08-05 DIAGNOSIS — R93.6 ABNORMAL MRI, SHOULDER: ICD-10-CM

## 2020-08-05 DIAGNOSIS — M54.16 LUMBAR RADICULOPATHY: ICD-10-CM

## 2020-08-05 LAB — ANA SCREEN: NEGATIVE

## 2020-08-05 NOTE — TELEPHONE ENCOUNTER
Pt asked to have PT orders faxed to [facility] pt we faxed them yesterday and confirmed. Pt stated he called this morning and they did not receive them.   Fax number is  Fax:  [phone]    Pt will call Ramon Scott to reconfirm the correct fax bina

## 2020-08-05 NOTE — TELEPHONE ENCOUNTER
Pt stated [facility] did not receive PT orders.   Need Faxed PT orders to [address] [place] [address]  Fax [phone]  Fax sent  Fax confirmed

## 2020-08-06 ENCOUNTER — TELEPHONE (OUTPATIENT)
Dept: PHYSICAL THERAPY | Age: 25
End: 2020-08-06

## 2020-08-06 ENCOUNTER — APPOINTMENT (OUTPATIENT)
Dept: PHYSICAL THERAPY | Age: 25
End: 2020-08-06

## 2020-08-06 ENCOUNTER — APPOINTMENT (OUTPATIENT)
Dept: PHYSICAL THERAPY | Age: 25
End: 2020-08-06
Attending: PHYSICIAN ASSISTANT
Payer: MEDICAID

## 2020-08-11 ENCOUNTER — APPOINTMENT (OUTPATIENT)
Dept: PHYSICAL THERAPY | Age: 25
End: 2020-08-11
Attending: PHYSICIAN ASSISTANT
Payer: MEDICAID

## 2020-08-11 ENCOUNTER — HOSPITAL ENCOUNTER (OUTPATIENT)
Dept: PHYSICAL MEDICINE AND REHAB | Age: 25
Discharge: STILL ADMITTED OR ADMINISTRATIVE | End: 2020-08-11
Attending: PHYSICIAN ASSISTANT

## 2020-08-11 ENCOUNTER — APPOINTMENT (OUTPATIENT)
Dept: PHYSICAL THERAPY | Age: 25
End: 2020-08-11

## 2020-08-11 DIAGNOSIS — G89.29 CHRONIC RIGHT SHOULDER PAIN: Primary | ICD-10-CM

## 2020-08-11 DIAGNOSIS — M25.511 CHRONIC RIGHT SHOULDER PAIN: Primary | ICD-10-CM

## 2020-08-11 DIAGNOSIS — M54.16 LUMBAR RADICULOPATHY: ICD-10-CM

## 2020-08-11 PROCEDURE — 97162 PT EVAL MOD COMPLEX 30 MIN: CPT | Performed by: PHYSICAL THERAPIST

## 2020-08-11 PROCEDURE — 97110 THERAPEUTIC EXERCISES: CPT | Performed by: PHYSICAL THERAPIST

## 2020-08-11 ASSESSMENT — ENCOUNTER SYMPTOMS
SUBJECTIVE PAIN PROGRESSION: IMPROVED
PAIN SEVERITY NOW: 7
ALLEVIATING FACTORS: HEAT

## 2020-08-13 ENCOUNTER — APPOINTMENT (OUTPATIENT)
Dept: PHYSICAL THERAPY | Age: 25
End: 2020-08-13
Attending: PHYSICIAN ASSISTANT
Payer: MEDICAID

## 2020-08-13 ENCOUNTER — APPOINTMENT (OUTPATIENT)
Dept: PHYSICAL THERAPY | Age: 25
End: 2020-08-13

## 2020-08-14 ENCOUNTER — TELEPHONE (OUTPATIENT)
Dept: NEUROLOGY | Facility: CLINIC | Age: 25
End: 2020-08-14

## 2020-08-18 ENCOUNTER — APPOINTMENT (OUTPATIENT)
Dept: PHYSICAL THERAPY | Age: 25
End: 2020-08-18
Attending: PHYSICIAN ASSISTANT
Payer: MEDICAID

## 2020-08-18 ENCOUNTER — APPOINTMENT (OUTPATIENT)
Dept: PHYSICAL THERAPY | Age: 25
End: 2020-08-18

## 2020-08-18 NOTE — TELEPHONE ENCOUNTER
Patient receives PT at [address] [place] [address]. Receives therapy and instructions for home exercise program.      Pain with reduction in pain with therapy.

## 2020-08-19 ENCOUNTER — APPOINTMENT (OUTPATIENT)
Dept: PHYSICAL THERAPY | Age: 25
End: 2020-08-19
Attending: PHYSICIAN ASSISTANT
Payer: MEDICAID

## 2020-08-19 ENCOUNTER — HOSPITAL ENCOUNTER (OUTPATIENT)
Dept: ULTRASOUND IMAGING | Age: 25
Discharge: HOME | End: 2020-08-19
Attending: NURSE PRACTITIONER
Payer: MEDICAID

## 2020-08-19 DIAGNOSIS — R39.14 FEELING OF INCOMPLETE BLADDER EMPTYING: ICD-10-CM

## 2020-08-19 PROCEDURE — 76857 US EXAM PELVIC LIMITED: CPT | Performed by: NURSE PRACTITIONER

## 2020-08-20 ENCOUNTER — APPOINTMENT (OUTPATIENT)
Dept: PHYSICAL THERAPY | Age: 25
End: 2020-08-20

## 2020-08-25 ENCOUNTER — TELEPHONE (OUTPATIENT)
Dept: PAIN CLINIC | Facility: CLINIC | Age: 25
End: 2020-08-25

## 2020-08-25 ENCOUNTER — OFFICE VISIT (OUTPATIENT)
Dept: PAIN CLINIC | Facility: CLINIC | Age: 25
End: 2020-08-25
Payer: MEDICAID

## 2020-08-25 VITALS — HEIGHT: 73 IN | WEIGHT: 260 LBS | BODY MASS INDEX: 34.46 KG/M2

## 2020-08-25 DIAGNOSIS — M47.817 SPONDYLOSIS OF LUMBOSACRAL REGION, UNSPECIFIED SPINAL OSTEOARTHRITIS COMPLICATION STATUS: ICD-10-CM

## 2020-08-25 DIAGNOSIS — M47.816 LUMBAR FACET ARTHROPATHY: Primary | ICD-10-CM

## 2020-08-25 PROCEDURE — 99204 OFFICE O/P NEW MOD 45 MIN: CPT | Performed by: ANESTHESIOLOGY

## 2020-08-25 PROCEDURE — 3008F BODY MASS INDEX DOCD: CPT | Performed by: ANESTHESIOLOGY

## 2020-08-25 NOTE — TELEPHONE ENCOUNTER
Medical clearance needed- No    Implanted cardiac device/SCS/PNS: NA    Pt seen in OV today by Dr. Elizabeth Moulton and recommended for Facet (X 2). Please begin PA process for procedure(s).      Laterality: Left followed by Right  Level(s): L3-4, L4-5, L5-S1    Pt in

## 2020-08-25 NOTE — PROGRESS NOTES
PHYSICAL EXAM:   Physical Exam   Constitutional:           Patient presents in office today with reported pain in lower back, hips, knees and elbows    Current pain level reported = 7/10      Location of Pain: lower back, hips, knees, elbows    Date Pain

## 2020-08-26 ENCOUNTER — APPOINTMENT (OUTPATIENT)
Dept: PHYSICAL THERAPY | Age: 25
End: 2020-08-26
Attending: PHYSICIAN ASSISTANT
Payer: MEDICAID

## 2020-08-31 NOTE — PROGRESS NOTES
Name: Erick Pollard   : 1995   DOS: 2020     Chief complaint: Low back pain    History of present illness:  Erick Pollard is a 23year old male complaining of  pain in the lower back for 5 years.   The patient used to play football tobacco: Never Used    Alcohol use: No    Drug use: Yes      Types: Cannabis      Comment: XANAX      Review of  other systems:  Constitutional: negative for fever, weight loss and malaise/fatigue  Cardiovascular:  Denies shortness of breath, chest pain, d Examination:    (R)   (L)  Deltoid:      5    5  Biceps:       5    5  Triceps:      5    5  Wrist Extension:     5    5  Wrist Flexsion:                 5    5  Finger Extension:     5    5  Finger Flexsion:     5    5    Deep Tendon Reflexes: N   L5:      N                          N    S1:      N                          N   S3:      N                          N      Examination of the right shoulder joint: Flexion 0-160, abduction 0-140, external rotation 0-60, internal rotation 0-70, abducti 5/5 quad and hamstring strength. No sign of DVT. Examination of the right and left elbow reveals tenderness over the lateral epicondyle and ECRB origin. No significant tenderness over the medial epicondyle.   The patient has range of motion of 0 to 140

## 2020-09-01 NOTE — TELEPHONE ENCOUNTER
Prior authorization request completed for: Left L3-S1 FACET   Authorization #[id number]    Authorization dates: 09/01/20 - 02/28/21  CPT codes approved: 59113,02863,37308  Number of visits/dates of service approved: 1  Physician: López Lover     Patient can be sc

## 2020-09-02 ENCOUNTER — APPOINTMENT (OUTPATIENT)
Dept: PHYSICAL THERAPY | Age: 25
End: 2020-09-02
Attending: PHYSICIAN ASSISTANT
Payer: MEDICAID

## 2020-09-02 NOTE — TELEPHONE ENCOUNTER
Patient scheduled for a pain management procedure, pre-procedure instructions reviewed. Patient advised of new process for \"virtual check-in\".  Patient advised to call Outpatient Registration at [phone] at time of check-in from car, will complete ? Please note-No prescriptions will be written by Pain Clinic in [place] on the day of procedure. If you require a refill of medications, please contact the office 48 hours prior to your procedure.   ? If you have an implanted Spinal Cord or Peripheral Nerve Sti Please contact your insurance carrier to determine what your financial responsibility will be for the procedure(s).       Cancellation/Rescheduling Appointment:   In the event you need to cancel or reschedule your appointment, you must notify the office 24

## 2020-09-08 ENCOUNTER — APPOINTMENT (OUTPATIENT)
Dept: PHYSICAL THERAPY | Age: 25
End: 2020-09-08
Attending: PHYSICIAN ASSISTANT
Payer: MEDICAID

## 2020-09-14 ENCOUNTER — HOSPITAL ENCOUNTER (OUTPATIENT)
Facility: HOSPITAL | Age: 25
Setting detail: HOSPITAL OUTPATIENT SURGERY
Discharge: HOME | End: 2020-09-14
Attending: ANESTHESIOLOGY | Admitting: ANESTHESIOLOGY
Payer: MEDICAID

## 2020-09-14 ENCOUNTER — APPOINTMENT (OUTPATIENT)
Dept: GENERAL RADIOLOGY | Facility: HOSPITAL | Age: 25
End: 2020-09-14
Attending: ANESTHESIOLOGY
Payer: MEDICAID

## 2020-09-14 VITALS
HEART RATE: 66 BPM | TEMPERATURE: 98 F | SYSTOLIC BLOOD PRESSURE: 130 MMHG | OXYGEN SATURATION: 97 % | DIASTOLIC BLOOD PRESSURE: 87 MMHG | RESPIRATION RATE: 18 BRPM

## 2020-09-14 DIAGNOSIS — M47.816 LUMBAR FACET ARTHROPATHY: ICD-10-CM

## 2020-09-14 DIAGNOSIS — M47.817 SPONDYLOSIS OF LUMBOSACRAL REGION, UNSPECIFIED SPINAL OSTEOARTHRITIS COMPLICATION STATUS: ICD-10-CM

## 2020-09-14 PROCEDURE — 3E0U33Z INTRODUCTION OF ANTI-INFLAMMATORY INTO JOINTS, PERCUTANEOUS APPROACH: ICD-10-PCS | Performed by: ANESTHESIOLOGY

## 2020-09-14 PROCEDURE — 3E0U3BZ INTRODUCTION OF ANESTHETIC AGENT INTO JOINTS, PERCUTANEOUS APPROACH: ICD-10-PCS | Performed by: ANESTHESIOLOGY

## 2020-09-14 RX ORDER — ONDANSETRON 2 MG/ML
4 INJECTION INTRAMUSCULAR; INTRAVENOUS ONCE AS NEEDED
Status: DISCONTINUED | OUTPATIENT
Start: 2020-09-14 | End: 2020-09-14

## 2020-09-14 RX ORDER — DIPHENHYDRAMINE HYDROCHLORIDE 50 MG/ML
50 INJECTION INTRAMUSCULAR; INTRAVENOUS ONCE AS NEEDED
Status: DISCONTINUED | OUTPATIENT
Start: 2020-09-14 | End: 2020-09-14

## 2020-09-14 RX ORDER — METHYLPREDNISOLONE ACETATE 40 MG/ML
INJECTION, SUSPENSION INTRA-ARTICULAR; INTRALESIONAL; INTRAMUSCULAR; SOFT TISSUE AS NEEDED
Status: DISCONTINUED | OUTPATIENT
Start: 2020-09-14 | End: 2020-09-14

## 2020-09-14 RX ORDER — LIDOCAINE HYDROCHLORIDE 10 MG/ML
INJECTION, SOLUTION EPIDURAL; INFILTRATION; INTRACAUDAL; PERINEURAL AS NEEDED
Status: DISCONTINUED | OUTPATIENT
Start: 2020-09-14 | End: 2020-09-14

## 2020-09-14 NOTE — H&P
History & Physical Examination    Patient Name: Homero Soto  MRN: [id number]  CSN: [id number]  YOB: 1995    Pre-Operative Diagnosis:  Lumbar facet arthropathy [M47.816]  Spondylosis of lumbosacral region, unspecified spinal osteoarthri

## 2020-09-14 NOTE — OPERATIVE REPORT
[facility]  Operative Report  2020     Marquise Rivera Patient Status:  Hospital Outpatient Surgery    1995 MRN [id number]   [facility] ENDOSCOPY Attending Erica Sousa MD   Hosp Day # 0 PCP Emery Bone MD blood, approximately 1 mL of 1% lidocaine with 20 mg of methylprednisolone was injected into each joint without complication. The needle was withdrawn with stylet in situ after being flushed with 0.5 mL lidocaine.   The patient tolerated procedure very wel

## 2020-09-22 ENCOUNTER — TELEPHONE (OUTPATIENT)
Dept: PHYSICAL MEDICINE AND REHAB | Age: 25
End: 2020-09-22

## 2020-09-23 ENCOUNTER — TELEPHONE (OUTPATIENT)
Dept: NEUROLOGY | Facility: CLINIC | Age: 25
End: 2020-09-23

## 2020-09-23 NOTE — TELEPHONE ENCOUNTER
Fax received from [address] [place] [address]. Patient to be discharged from therapy d/t no shows for scheduled appointments. Goals not achieved. Paperwork signed and faxed as directed with receipt confirmation.

## 2020-09-29 ENCOUNTER — APPOINTMENT (OUTPATIENT)
Dept: PHYSICAL MEDICINE AND REHAB | Age: 25
End: 2020-09-29

## 2020-09-30 NOTE — TELEPHONE ENCOUNTER
Spoke with patient to obtain additional information to initiate PA for Right Lumbar Facet     Reports Pain % relief - 50% better than it was before for the last 2 weeks     Functional status improvement - patient states he is able to stand longer     Medic

## 2020-10-01 ENCOUNTER — APPOINTMENT (OUTPATIENT)
Dept: PHYSICAL MEDICINE AND REHAB | Age: 25
End: 2020-10-01
Attending: PHYSICIAN ASSISTANT

## 2020-10-01 NOTE — TELEPHONE ENCOUNTER
Started PA via [place] for Right L3-S1 FACET ([id number]) x1 , Uploaded Clinicals     Case #:[id number]

## 2020-10-07 NOTE — TELEPHONE ENCOUNTER
Right L3-S1 FACET ([id number]) Denied     Denial Reason:    Based on [employer] Comprehensive Musculoskeletal Management Guideline  - Facet Joint Injections/Medial Branch Blocks (used to treat spine pain), we cannot approve this request. Your re

## 2020-10-13 NOTE — TELEPHONE ENCOUNTER
Peer to peer would be fruitless given that he did not have the amount of relief that his insurance company is looking for In order to proceed with the other side.  Would you please schedule him for follow up office visit with Dr. Josee Estevez to discuss other treat

## 2020-10-22 ENCOUNTER — OFFICE VISIT (OUTPATIENT)
Dept: PAIN CLINIC | Facility: CLINIC | Age: 25
End: 2020-10-22
Payer: MEDICAID

## 2020-10-22 ENCOUNTER — TELEPHONE (OUTPATIENT)
Dept: PAIN CLINIC | Facility: CLINIC | Age: 25
End: 2020-10-22

## 2020-10-22 VITALS
DIASTOLIC BLOOD PRESSURE: 88 MMHG | WEIGHT: 260 LBS | BODY MASS INDEX: 34.46 KG/M2 | HEART RATE: 69 BPM | HEIGHT: 73 IN | OXYGEN SATURATION: 97 % | SYSTOLIC BLOOD PRESSURE: 122 MMHG

## 2020-10-22 DIAGNOSIS — M54.16 LUMBAR RADICULITIS: Primary | ICD-10-CM

## 2020-10-22 PROCEDURE — 99214 OFFICE O/P EST MOD 30 MIN: CPT | Performed by: ANESTHESIOLOGY

## 2020-10-22 PROCEDURE — 3074F SYST BP LT 130 MM HG: CPT | Performed by: ANESTHESIOLOGY

## 2020-10-22 PROCEDURE — 3079F DIAST BP 80-89 MM HG: CPT | Performed by: ANESTHESIOLOGY

## 2020-10-22 PROCEDURE — 3008F BODY MASS INDEX DOCD: CPT | Performed by: ANESTHESIOLOGY

## 2020-10-22 NOTE — PROGRESS NOTES
Last procedure: LEFT LUMBAR FACET JOINT INJECTION, LUMBAR 3-SACRAL 1, WITHOUT SEDATION  Date: 9/14/2020  Percentage of relief obtained: None  Duration of relief: None    Patient presents in office today with reported pain in lower back and shooting pain in

## 2020-10-22 NOTE — TELEPHONE ENCOUNTER
Medical clearance needed- No    Implanted cardiac device/SCS/PNS: NA    Pt seen in OV today by Dr. Abril Morales and recommended for LESI (X 3). Please begin PA process for procedure(s).      Laterality: NA  Level(s): L4-5    Pt informed callback will be given when

## 2020-10-26 NOTE — PROGRESS NOTES
Name: Kandi Ahumada   : 1995   DOS: 10/22/2020     Pain Clinic Follow Up Visit:   Kandi Ahumada is a 23year old male who presents for recheck of his chronic low back pain.   He is status post bilateral diagnostic lumbar facet joint inje walking is normal.    ASSESSMENT AND PLAN:   Lumbar radiculitis  Sacroiliitis    My plan is to bring her back for lumbar epidural steroid injection at L4-L5 level. Risk and benefits of the procedure were discussed with the patient.   The patient wanted to

## 2020-10-27 NOTE — TELEPHONE ENCOUNTER
Prior authorization request completed for: 1st ASHISH   Authorization #[id number]    Authorization dates: 10/27/20 - 04/25/21   CPT codes approved: 79580  Number of visits/dates of service approved: 1  Physician: López Lover     Patient can be scheduled

## 2020-10-28 NOTE — TELEPHONE ENCOUNTER
Patient advised of insurance approval to proceed with injections and is agreeable to scheduling. Patient scheduled for procedure, pre-procedure instructions reviewed. Patient prefers conscious sedation.  Reviewed sedation instructions including need to be f ? If you take morning blood pressure medication or oral diabetic medication, please take with a small sip of water. ? You are required to have a responsible adult drive you home after your procedure.  You may not take a cab unless you have a responsible ad Meloxicam -- Cervical procedures require 3 days    Ibuprofen (Motrin, Advil, Vicoprofen), Naproxen (Naprosyn, Aleve), Piroxcam (Feldene), Meloxicam (Mobic)--(Cervical procedures will require 3 days), Oxaprozin (Daypro), Diclofenac (Voltaren), Indomethacin

## 2020-11-08 ENCOUNTER — APPOINTMENT (OUTPATIENT)
Dept: LAB | Facility: HOSPITAL | Age: 25
End: 2020-11-08
Attending: ANESTHESIOLOGY
Payer: MEDICAID

## 2020-11-08 DIAGNOSIS — M54.16 LUMBAR RADICULITIS: ICD-10-CM

## 2020-11-10 ENCOUNTER — TELEPHONE (OUTPATIENT)
Dept: SURGERY | Facility: CLINIC | Age: 25
End: 2020-11-10

## 2020-11-11 ENCOUNTER — HOSPITAL ENCOUNTER (OUTPATIENT)
Facility: HOSPITAL | Age: 25
Setting detail: HOSPITAL OUTPATIENT SURGERY
Discharge: HOME | End: 2020-11-11
Attending: ANESTHESIOLOGY | Admitting: ANESTHESIOLOGY
Payer: MEDICAID

## 2020-11-11 ENCOUNTER — APPOINTMENT (OUTPATIENT)
Dept: GENERAL RADIOLOGY | Facility: HOSPITAL | Age: 25
End: 2020-11-11
Attending: ANESTHESIOLOGY
Payer: MEDICAID

## 2020-11-11 VITALS
TEMPERATURE: 98 F | OXYGEN SATURATION: 98 % | DIASTOLIC BLOOD PRESSURE: 62 MMHG | HEART RATE: 70 BPM | SYSTOLIC BLOOD PRESSURE: 125 MMHG | RESPIRATION RATE: 16 BRPM

## 2020-11-11 DIAGNOSIS — M54.16 LUMBAR RADICULITIS: ICD-10-CM

## 2020-11-11 PROCEDURE — 99152 MOD SED SAME PHYS/QHP 5/>YRS: CPT | Performed by: ANESTHESIOLOGY

## 2020-11-11 PROCEDURE — B01B1ZZ FLUOROSCOPY OF SPINAL CORD USING LOW OSMOLAR CONTRAST: ICD-10-PCS | Performed by: ANESTHESIOLOGY

## 2020-11-11 PROCEDURE — 3E0R3BZ INTRODUCTION OF ANESTHETIC AGENT INTO SPINAL CANAL, PERCUTANEOUS APPROACH: ICD-10-PCS | Performed by: ANESTHESIOLOGY

## 2020-11-11 PROCEDURE — 3E0R33Z INTRODUCTION OF ANTI-INFLAMMATORY INTO SPINAL CANAL, PERCUTANEOUS APPROACH: ICD-10-PCS | Performed by: ANESTHESIOLOGY

## 2020-11-11 RX ORDER — SODIUM CHLORIDE 9 MG/ML
INJECTION INTRAVENOUS AS NEEDED
Status: DISCONTINUED | OUTPATIENT
Start: 2020-11-11 | End: 2020-11-11

## 2020-11-11 RX ORDER — DIPHENHYDRAMINE HYDROCHLORIDE 50 MG/ML
50 INJECTION INTRAMUSCULAR; INTRAVENOUS ONCE AS NEEDED
Status: DISCONTINUED | OUTPATIENT
Start: 2020-11-11 | End: 2020-11-11

## 2020-11-11 RX ORDER — ONDANSETRON 2 MG/ML
4 INJECTION INTRAMUSCULAR; INTRAVENOUS ONCE AS NEEDED
Status: DISCONTINUED | OUTPATIENT
Start: 2020-11-11 | End: 2020-11-11

## 2020-11-11 RX ORDER — LIDOCAINE HYDROCHLORIDE 10 MG/ML
INJECTION, SOLUTION EPIDURAL; INFILTRATION; INTRACAUDAL; PERINEURAL AS NEEDED
Status: DISCONTINUED | OUTPATIENT
Start: 2020-11-11 | End: 2020-11-11

## 2020-11-11 RX ORDER — DEXAMETHASONE SODIUM PHOSPHATE 10 MG/ML
INJECTION, SOLUTION INTRAMUSCULAR; INTRAVENOUS AS NEEDED
Status: DISCONTINUED | OUTPATIENT
Start: 2020-11-11 | End: 2020-11-11

## 2020-11-11 RX ORDER — MIDAZOLAM HYDROCHLORIDE 1 MG/ML
INJECTION INTRAMUSCULAR; INTRAVENOUS AS NEEDED
Status: DISCONTINUED | OUTPATIENT
Start: 2020-11-11 | End: 2020-11-11

## 2020-11-11 RX ORDER — SODIUM CHLORIDE, SODIUM LACTATE, POTASSIUM CHLORIDE, CALCIUM CHLORIDE 600; 310; 30; 20 MG/100ML; MG/100ML; MG/100ML; MG/100ML
100 INJECTION, SOLUTION INTRAVENOUS CONTINUOUS
Status: DISCONTINUED | OUTPATIENT
Start: 2020-11-11 | End: 2020-11-11

## 2020-11-11 NOTE — OPERATIVE REPORT
[facility]  Operative Report  2020     Lorrie Severs Patient Status:  Hospital Outpatient Surgery    1995 MRN [id number]   [facility] ENDOSCOPY Attending Marleny Ballesteros MD   Hosp Day # 0 PCP Breanne Gordon MD volume. A good epidurogram was obtained. Thereafter, dexamethasone 10 mg with normal saline of 5 mL in total volume of 6 mL was injected through the Tuohy needle. The needle was flushed with 1 mL lidocaine.   The needle was withdrawn with the stylet intac

## 2020-11-24 NOTE — TELEPHONE ENCOUNTER
Please contact patient for follow up to 11/11 1st LESI procedure:     Please document % relief from procedure, change in functional status, and/or change in medications since procedure.

## 2020-11-24 NOTE — TELEPHONE ENCOUNTER
Spoke with patient    Pain % relief- 30 % relief for the past 2 weeks     Functional status improvement - was able to work out on the elliptical in the gym     Pain meds- OTC pain meds only per patient

## 2020-11-24 NOTE — TELEPHONE ENCOUNTER
Prior authorization request completed for: 2nd [address] #[id number]    Authorization dates: 11/24/20 - 05/23/21  CPT codes approved: 06482  Number of visits/dates of service approved: 1  Physician: Abril Morales     Patient can be scheduled

## 2020-12-14 ENCOUNTER — WALK IN (OUTPATIENT)
Dept: URGENT CARE | Age: 25
End: 2020-12-14
Attending: EMERGENCY MEDICINE

## 2020-12-14 DIAGNOSIS — Z20.822 SUSPECTED COVID-19 VIRUS INFECTION: Primary | ICD-10-CM

## 2020-12-14 PROCEDURE — 99212 OFFICE O/P EST SF 10 MIN: CPT

## 2020-12-14 PROCEDURE — U0003 INFECTIOUS AGENT DETECTION BY NUCLEIC ACID (DNA OR RNA); SEVERE ACUTE RESPIRATORY SYNDROME CORONAVIRUS 2 (SARS-COV-2) (CORONAVIRUS DISEASE [COVID-19]), AMPLIFIED PROBE TECHNIQUE, MAKING USE OF HIGH THROUGHPUT TECHNOLOGIES AS DESCRIBED BY CMS-2020-01-R: HCPCS | Performed by: EMERGENCY MEDICINE

## 2020-12-14 PROCEDURE — C9803 HOPD COVID-19 SPEC COLLECT: HCPCS

## 2020-12-14 SDOH — HEALTH STABILITY: MENTAL HEALTH: HOW OFTEN DO YOU HAVE A DRINK CONTAINING ALCOHOL?: NEVER

## 2020-12-14 ASSESSMENT — ENCOUNTER SYMPTOMS
FEVER: 0
SHORTNESS OF BREATH: 0
RHINORRHEA: 1
CHILLS: 0
GASTROINTESTINAL NEGATIVE: 1
NEUROLOGICAL NEGATIVE: 1
COUGH: 1

## 2020-12-14 ASSESSMENT — PAIN SCALES - GENERAL: PAINLEVEL: 9-10

## 2020-12-15 ENCOUNTER — TELEPHONE (OUTPATIENT)
Dept: SCHEDULING | Age: 25
End: 2020-12-15

## 2020-12-15 LAB
SARS-COV-2 RNA RESP QL NAA+PROBE: NOT DETECTED
SERVICE CMNT-IMP: NORMAL
SERVICE CMNT-IMP: NORMAL

## 2020-12-30 NOTE — TELEPHONE ENCOUNTER
Please contact patient for follow up to 12/16 2nd LESI procedure:     Please document % relief from procedure, change in functional status, and/or change in medications since procedure.

## 2020-12-30 NOTE — TELEPHONE ENCOUNTER
Noted patient did not complete 2nd LESI procedure on 12/16/20 .      Per PA below   Authorization request completed for: 2nd LESI   Authorization #[id number]     Authorization dates: 11/24/20 - 05/23/21    Awaiting call back from patient to reschedule

## 2021-05-26 VITALS
HEART RATE: 74 BPM | SYSTOLIC BLOOD PRESSURE: 115 MMHG | RESPIRATION RATE: 18 BRPM | DIASTOLIC BLOOD PRESSURE: 71 MMHG | BODY MASS INDEX: 35.31 KG/M2 | WEIGHT: 275 LBS | TEMPERATURE: 97.2 F | OXYGEN SATURATION: 97 %

## 2021-09-27 ENCOUNTER — LAB ENCOUNTER (OUTPATIENT)
Dept: LAB | Facility: HOSPITAL | Age: 26
End: 2021-09-27
Attending: INTERNAL MEDICINE
Payer: MEDICAID

## 2021-09-27 DIAGNOSIS — Z01.818 PREOP TESTING: ICD-10-CM

## 2021-11-30 ENCOUNTER — HOSPITAL ENCOUNTER (EMERGENCY)
Age: 26
Discharge: HOME | End: 2021-11-30
Attending: EMERGENCY MEDICINE

## 2021-11-30 VITALS
WEIGHT: 245 LBS | RESPIRATION RATE: 16 BRPM | OXYGEN SATURATION: 100 % | SYSTOLIC BLOOD PRESSURE: 119 MMHG | TEMPERATURE: 98.3 F | BODY MASS INDEX: 31.44 KG/M2 | HEIGHT: 74 IN | DIASTOLIC BLOOD PRESSURE: 76 MMHG | HEART RATE: 67 BPM

## 2021-11-30 DIAGNOSIS — K29.00 ACUTE SUPERFICIAL GASTRITIS WITHOUT HEMORRHAGE: Primary | ICD-10-CM

## 2021-11-30 LAB
ALBUMIN SERPL-MCNC: 3.6 G/DL (ref 3.6–5.1)
ALP SERPL-CCNC: 51 UNITS/L (ref 45–117)
ALT SERPL-CCNC: 30 UNITS/L
ANION GAP SERPL CALC-SCNC: 5 MMOL/L (ref 10–20)
AST SERPL-CCNC: 27 UNITS/L
BASOPHILS # BLD: 0 K/MCL (ref 0–0.3)
BASOPHILS NFR BLD: 0 %
BILIRUB CONJ SERPL-MCNC: <0.1 MG/DL (ref 0–0.2)
BILIRUB SERPL-MCNC: 0.4 MG/DL (ref 0.2–1)
BUN SERPL-MCNC: 14 MG/DL (ref 6–20)
BUN/CREAT SERPL: 15 (ref 7–25)
CALCIUM SERPL-MCNC: 9 MG/DL (ref 8.4–10.2)
CHLORIDE SERPL-SCNC: 108 MMOL/L (ref 98–107)
CO2 SERPL-SCNC: 30 MMOL/L (ref 21–32)
CREAT SERPL-MCNC: 0.96 MG/DL (ref 0.67–1.17)
DEPRECATED RDW RBC: 44.4 FL (ref 39–50)
EOSINOPHIL # BLD: 0.1 K/MCL (ref 0–0.5)
EOSINOPHIL NFR BLD: 1 %
ERYTHROCYTE [DISTWIDTH] IN BLOOD: 13.1 % (ref 11–15)
FASTING DURATION TIME PATIENT: ABNORMAL H
GFR SERPLBLD BASED ON 1.73 SQ M-ARVRAT: >90 ML/MIN
GLUCOSE SERPL-MCNC: 110 MG/DL (ref 70–99)
HCT VFR BLD CALC: 43.7 % (ref 39–51)
HGB BLD-MCNC: 14.6 G/DL (ref 13–17)
IMM GRANULOCYTES # BLD AUTO: 0 K/MCL (ref 0–0.2)
IMM GRANULOCYTES # BLD: 1 %
LIPASE SERPL-CCNC: 84 UNITS/L (ref 73–393)
LYMPHOCYTES # BLD: 2 K/MCL (ref 1–4.8)
LYMPHOCYTES NFR BLD: 34 %
MCH RBC QN AUTO: 30.9 PG (ref 26–34)
MCHC RBC AUTO-ENTMCNC: 33.4 G/DL (ref 32–36.5)
MCV RBC AUTO: 92.6 FL (ref 78–100)
MONOCYTES # BLD: 0.4 K/MCL (ref 0.3–0.9)
MONOCYTES NFR BLD: 7 %
NEUTROPHILS # BLD: 3.5 K/MCL (ref 1.8–7.7)
NEUTROPHILS NFR BLD: 57 %
NRBC BLD MANUAL-RTO: 0 /100 WBC
PLATELET # BLD AUTO: 227 K/MCL (ref 140–450)
POTASSIUM SERPL-SCNC: 4.7 MMOL/L (ref 3.4–5.1)
PROT SERPL-MCNC: 6.8 G/DL (ref 6.4–8.2)
RBC # BLD: 4.72 MIL/MCL (ref 4.5–5.9)
SODIUM SERPL-SCNC: 138 MMOL/L (ref 135–145)
WBC # BLD: 6 K/MCL (ref 4.2–11)

## 2021-11-30 PROCEDURE — 80076 HEPATIC FUNCTION PANEL: CPT | Performed by: EMERGENCY MEDICINE

## 2021-11-30 PROCEDURE — 99284 EMERGENCY DEPT VISIT MOD MDM: CPT

## 2021-11-30 PROCEDURE — 85025 COMPLETE CBC W/AUTO DIFF WBC: CPT | Performed by: EMERGENCY MEDICINE

## 2021-11-30 PROCEDURE — 36415 COLL VENOUS BLD VENIPUNCTURE: CPT

## 2021-11-30 PROCEDURE — 80048 BASIC METABOLIC PNL TOTAL CA: CPT | Performed by: EMERGENCY MEDICINE

## 2021-11-30 PROCEDURE — 83690 ASSAY OF LIPASE: CPT | Performed by: EMERGENCY MEDICINE

## 2021-11-30 RX ORDER — ALPRAZOLAM 2 MG/1
2 TABLET ORAL DAILY
COMMUNITY

## 2021-11-30 RX ORDER — PANTOPRAZOLE SODIUM 40 MG/1
40 TABLET, DELAYED RELEASE ORAL DAILY
Qty: 30 TABLET | Refills: 0 | Status: SHIPPED | OUTPATIENT
Start: 2021-11-30 | End: 2023-01-23

## 2022-03-11 RX ORDER — PANTOPRAZOLE SODIUM 40 MG/1
40 TABLET, DELAYED RELEASE ORAL DAILY
Qty: 30 TABLET | Refills: 0 | OUTPATIENT
Start: 2022-03-11 | End: 2022-04-10

## 2022-09-15 ENCOUNTER — HOSPITAL ENCOUNTER (EMERGENCY)
Age: 27
Discharge: LEFT BEFORE DISCHARGE | End: 2022-09-15

## 2022-09-15 VITALS
RESPIRATION RATE: 20 BRPM | TEMPERATURE: 98.1 F | SYSTOLIC BLOOD PRESSURE: 132 MMHG | DIASTOLIC BLOOD PRESSURE: 72 MMHG | HEART RATE: 66 BPM | OXYGEN SATURATION: 98 %

## 2022-09-15 LAB
ATRIAL RATE (BPM): 57
P AXIS (DEGREES): 37
PR-INTERVAL (MSEC): 162
QRS-INTERVAL (MSEC): 100
QT-INTERVAL (MSEC): 410
QTC: 399
R AXIS (DEGREES): 23
REPORT TEXT: NORMAL
T AXIS (DEGREES): 30
VENTRICULAR RATE EKG/MIN (BPM): 57

## 2022-09-15 PROCEDURE — 93005 ELECTROCARDIOGRAM TRACING: CPT | Performed by: EMERGENCY MEDICINE

## 2022-09-15 PROCEDURE — 10003627 HB COUNTER ED NO SERVICE

## 2022-09-15 ASSESSMENT — PAIN SCALES - GENERAL: PAINLEVEL_OUTOF10: 7

## 2022-11-28 ENCOUNTER — HOSPITAL ENCOUNTER (OUTPATIENT)
Dept: GENERAL RADIOLOGY | Age: 27
Discharge: HOME | End: 2022-11-28
Attending: EMERGENCY MEDICINE

## 2022-11-28 ENCOUNTER — WALK IN (OUTPATIENT)
Dept: URGENT CARE | Age: 27
End: 2022-11-28
Attending: EMERGENCY MEDICINE

## 2022-11-28 VITALS
OXYGEN SATURATION: 98 % | SYSTOLIC BLOOD PRESSURE: 139 MMHG | HEART RATE: 86 BPM | DIASTOLIC BLOOD PRESSURE: 105 MMHG | TEMPERATURE: 97.1 F | RESPIRATION RATE: 16 BRPM

## 2022-11-28 DIAGNOSIS — J20.9 BRONCHITIS WITH BRONCHOSPASM: ICD-10-CM

## 2022-11-28 DIAGNOSIS — R03.0 ELEVATED BLOOD-PRESSURE READING WITHOUT DIAGNOSIS OF HYPERTENSION: ICD-10-CM

## 2022-11-28 DIAGNOSIS — Z20.822 SUSPECTED COVID-19 VIRUS INFECTION: ICD-10-CM

## 2022-11-28 DIAGNOSIS — J20.9 BRONCHITIS WITH BRONCHOSPASM: Primary | ICD-10-CM

## 2022-11-28 LAB
FLUAV RNA RESP QL NAA+PROBE: NOT DETECTED
FLUBV RNA RESP QL NAA+PROBE: NOT DETECTED
RSV AG NPH QL IA.RAPID: NOT DETECTED
SARS-COV-2 RNA RESP QL NAA+PROBE: NOT DETECTED

## 2022-11-28 PROCEDURE — 0241U POCT COVID/FLU/RSV PANEL: CPT | Performed by: EMERGENCY MEDICINE

## 2022-11-28 PROCEDURE — 71046 X-RAY EXAM CHEST 2 VIEWS: CPT

## 2022-11-28 PROCEDURE — C9803 HOPD COVID-19 SPEC COLLECT: HCPCS

## 2022-11-28 PROCEDURE — 99212 OFFICE O/P EST SF 10 MIN: CPT

## 2022-11-28 RX ORDER — OMEPRAZOLE 40 MG/1
CAPSULE, DELAYED RELEASE ORAL
COMMUNITY
Start: 2022-11-21

## 2022-11-28 RX ORDER — SUCRALFATE 1 G/1
1 TABLET ORAL 2 TIMES DAILY
COMMUNITY
Start: 2022-11-16 | End: 2023-01-23

## 2022-11-28 RX ORDER — AZITHROMYCIN 250 MG/1
TABLET, FILM COATED ORAL
Qty: 6 TABLET | Refills: 0 | Status: SHIPPED | OUTPATIENT
Start: 2022-11-28 | End: 2023-01-23

## 2022-11-28 RX ORDER — BENZONATATE 100 MG/1
100 CAPSULE ORAL 3 TIMES DAILY PRN
Qty: 20 CAPSULE | Refills: 0 | Status: SHIPPED | OUTPATIENT
Start: 2022-11-28 | End: 2023-01-23

## 2022-11-28 RX ORDER — ALBUTEROL SULFATE 90 UG/1
2 AEROSOL, METERED RESPIRATORY (INHALATION) EVERY 4 HOURS PRN
Qty: 1 EACH | Refills: 0 | Status: SHIPPED | OUTPATIENT
Start: 2022-11-28 | End: 2023-01-23

## 2022-11-28 ASSESSMENT — PAIN SCALES - GENERAL
PAINLEVEL_OUTOF10: 9
PAINLEVEL: 9

## 2023-01-23 ENCOUNTER — ANESTHESIA EVENT (OUTPATIENT)
Dept: SURGERY | Age: 28
End: 2023-01-23

## 2023-01-23 ENCOUNTER — ANESTHESIA (OUTPATIENT)
Dept: SURGERY | Age: 28
End: 2023-01-23

## 2023-01-23 ENCOUNTER — APPOINTMENT (OUTPATIENT)
Dept: CT IMAGING | Age: 28
End: 2023-01-23
Attending: PHYSICIAN ASSISTANT

## 2023-01-23 ENCOUNTER — HOSPITAL ENCOUNTER (OUTPATIENT)
Age: 28
Setting detail: OBSERVATION
Discharge: HOME | End: 2023-01-24
Attending: EMERGENCY MEDICINE

## 2023-01-23 DIAGNOSIS — K35.30 ACUTE APPENDICITIS WITH LOCALIZED PERITONITIS, WITHOUT PERFORATION, ABSCESS, OR GANGRENE: Primary | ICD-10-CM

## 2023-01-23 LAB
ALBUMIN SERPL-MCNC: 3.8 G/DL (ref 3.6–5.1)
ALBUMIN/GLOB SERPL: 1.1 {RATIO} (ref 1–2.4)
ALP SERPL-CCNC: 60 UNITS/L (ref 45–117)
ALT SERPL-CCNC: 28 UNITS/L
ANION GAP SERPL CALC-SCNC: 10 MMOL/L (ref 7–19)
APPEARANCE UR: ABNORMAL
AST SERPL-CCNC: 26 UNITS/L
BASOPHILS # BLD: 0 K/MCL (ref 0–0.3)
BASOPHILS NFR BLD: 0 %
BILIRUB SERPL-MCNC: 0.5 MG/DL (ref 0.2–1)
BILIRUB UR QL STRIP: NEGATIVE
BUN SERPL-MCNC: 14 MG/DL (ref 6–20)
BUN/CREAT SERPL: 14 (ref 7–25)
CALCIUM SERPL-MCNC: 9.1 MG/DL (ref 8.4–10.2)
CHLORIDE SERPL-SCNC: 106 MMOL/L (ref 97–110)
CO2 SERPL-SCNC: 27 MMOL/L (ref 21–32)
COLOR UR: YELLOW
CREAT SERPL-MCNC: 0.99 MG/DL (ref 0.67–1.17)
DEPRECATED RDW RBC: 40.7 FL (ref 39–50)
EOSINOPHIL # BLD: 0.1 K/MCL (ref 0–0.5)
EOSINOPHIL NFR BLD: 1 %
ERYTHROCYTE [DISTWIDTH] IN BLOOD: 12.4 % (ref 11–15)
FASTING DURATION TIME PATIENT: ABNORMAL H
GFR SERPLBLD BASED ON 1.73 SQ M-ARVRAT: >90 ML/MIN
GLOBULIN SER-MCNC: 3.4 G/DL (ref 2–4)
GLUCOSE SERPL-MCNC: 106 MG/DL (ref 70–99)
GLUCOSE UR STRIP-MCNC: NEGATIVE MG/DL
HCT VFR BLD CALC: 45.9 % (ref 39–51)
HGB BLD-MCNC: 16.1 G/DL (ref 13–17)
HGB UR QL STRIP: NEGATIVE
IMM GRANULOCYTES # BLD AUTO: 0 K/MCL (ref 0–0.2)
IMM GRANULOCYTES # BLD: 0 %
KETONES UR STRIP-MCNC: NEGATIVE MG/DL
LEUKOCYTE ESTERASE UR QL STRIP: NEGATIVE
LIPASE SERPL-CCNC: 94 UNITS/L (ref 73–393)
LYMPHOCYTES # BLD: 2.3 K/MCL (ref 1–4.8)
LYMPHOCYTES NFR BLD: 33 %
MAGNESIUM SERPL-MCNC: 2.1 MG/DL (ref 1.7–2.4)
MCH RBC QN AUTO: 31.3 PG (ref 26–34)
MCHC RBC AUTO-ENTMCNC: 35.1 G/DL (ref 32–36.5)
MCV RBC AUTO: 89.1 FL (ref 78–100)
MONOCYTES # BLD: 0.4 K/MCL (ref 0.3–0.9)
MONOCYTES NFR BLD: 6 %
NEUTROPHILS # BLD: 4.1 K/MCL (ref 1.8–7.7)
NEUTROPHILS NFR BLD: 60 %
NITRITE UR QL STRIP: NEGATIVE
NRBC BLD MANUAL-RTO: 0 /100 WBC
PH UR STRIP: 6 [PH] (ref 5–7)
PLATELET # BLD AUTO: 253 K/MCL (ref 140–450)
POTASSIUM SERPL-SCNC: 4.3 MMOL/L (ref 3.4–5.1)
PROT SERPL-MCNC: 7.2 G/DL (ref 6.4–8.2)
PROT UR STRIP-MCNC: ABNORMAL MG/DL
RAINBOW EXTRA TUBES HOLD SPECIMEN: NORMAL
RBC # BLD: 5.15 MIL/MCL (ref 4.5–5.9)
SARS-COV-2 RNA RESP QL NAA+PROBE: NOT DETECTED
SERVICE CMNT-IMP: NORMAL
SERVICE CMNT-IMP: NORMAL
SODIUM SERPL-SCNC: 139 MMOL/L (ref 135–145)
SP GR UR STRIP: 1.03 (ref 1–1.03)
UROBILINOGEN UR STRIP-MCNC: 0.2 MG/DL
WBC # BLD: 6.9 K/MCL (ref 4.2–11)

## 2023-01-23 PROCEDURE — 10002801 HB RX 250 W/O HCPCS: Performed by: GENERAL ACUTE CARE HOSPITAL

## 2023-01-23 PROCEDURE — 99223 1ST HOSP IP/OBS HIGH 75: CPT

## 2023-01-23 PROCEDURE — C9803 HOPD COVID-19 SPEC COLLECT: HCPCS

## 2023-01-23 PROCEDURE — 13000002 HB ANESTHESIA  GENERAL  S/U + 1ST 15 MIN

## 2023-01-23 PROCEDURE — 74177 CT ABD & PELVIS W/CONTRAST: CPT

## 2023-01-23 PROCEDURE — 10002807 HB RX 258: Performed by: GENERAL ACUTE CARE HOSPITAL

## 2023-01-23 PROCEDURE — 13000037 HB COMPLEX CASE EACH ADD MINUTE

## 2023-01-23 PROCEDURE — 83690 ASSAY OF LIPASE: CPT | Performed by: PHYSICIAN ASSISTANT

## 2023-01-23 PROCEDURE — 10002803 HB RX 637

## 2023-01-23 PROCEDURE — G0378 HOSPITAL OBSERVATION PER HR: HCPCS

## 2023-01-23 PROCEDURE — 10002801 HB RX 250 W/O HCPCS: Performed by: PHYSICIAN ASSISTANT

## 2023-01-23 PROCEDURE — 88304 TISSUE EXAM BY PATHOLOGIST: CPT

## 2023-01-23 PROCEDURE — 10002805 HB CONTRAST AGENT: Performed by: PHYSICIAN ASSISTANT

## 2023-01-23 PROCEDURE — 10004451 HB PACU RECOVERY 1ST 30 MINUTES

## 2023-01-23 PROCEDURE — 10006023 HB SUPPLY 272

## 2023-01-23 PROCEDURE — 96375 TX/PRO/DX INJ NEW DRUG ADDON: CPT

## 2023-01-23 PROCEDURE — 10002801 HB RX 250 W/O HCPCS

## 2023-01-23 PROCEDURE — G1004 CDSM NDSC: HCPCS

## 2023-01-23 PROCEDURE — 96374 THER/PROPH/DIAG INJ IV PUSH: CPT

## 2023-01-23 PROCEDURE — 13000003 HB ANESTHESIA  GENERAL EA ADD MINUTE

## 2023-01-23 PROCEDURE — 87635 SARS-COV-2 COVID-19 AMP PRB: CPT | Performed by: EMERGENCY MEDICINE

## 2023-01-23 PROCEDURE — 44970 LAPAROSCOPY APPENDECTOMY: CPT

## 2023-01-23 PROCEDURE — 10006027 HB SUPPLY 278

## 2023-01-23 PROCEDURE — 10002807 HB RX 258: Performed by: EMERGENCY MEDICINE

## 2023-01-23 PROCEDURE — 99285 EMERGENCY DEPT VISIT HI MDM: CPT

## 2023-01-23 PROCEDURE — 85025 COMPLETE CBC W/AUTO DIFF WBC: CPT | Performed by: PHYSICIAN ASSISTANT

## 2023-01-23 PROCEDURE — 81003 URINALYSIS AUTO W/O SCOPE: CPT | Performed by: EMERGENCY MEDICINE

## 2023-01-23 PROCEDURE — 10002800 HB RX 250 W HCPCS: Performed by: GENERAL ACUTE CARE HOSPITAL

## 2023-01-23 PROCEDURE — 10002800 HB RX 250 W HCPCS: Performed by: PHYSICIAN ASSISTANT

## 2023-01-23 PROCEDURE — 10002800 HB RX 250 W HCPCS

## 2023-01-23 PROCEDURE — 80053 COMPREHEN METABOLIC PANEL: CPT | Performed by: PHYSICIAN ASSISTANT

## 2023-01-23 PROCEDURE — 10002807 HB RX 258

## 2023-01-23 PROCEDURE — 83735 ASSAY OF MAGNESIUM: CPT | Performed by: PHYSICIAN ASSISTANT

## 2023-01-23 PROCEDURE — 13000036 HB COMPLEX  CASE S/U + 1ST 15 MIN

## 2023-01-23 PROCEDURE — 10004651 HB RX, NO CHARGE ITEM

## 2023-01-23 DEVICE — ENDOPATH ECHELON ENDOSCOPIC LINEAR CUTTER RELOADS, BLUE, 60MM
Type: IMPLANTABLE DEVICE | Site: ABDOMEN | Status: FUNCTIONAL
Brand: ECHELON ENDOPATH

## 2023-01-23 RX ORDER — ROCURONIUM BROMIDE 10 MG/ML
INJECTION, SOLUTION INTRAVENOUS PRN
Status: DISCONTINUED | OUTPATIENT
Start: 2023-01-23 | End: 2023-01-23

## 2023-01-23 RX ORDER — PROCHLORPERAZINE EDISYLATE 5 MG/ML
5 INJECTION INTRAMUSCULAR; INTRAVENOUS EVERY 4 HOURS PRN
Status: DISCONTINUED | OUTPATIENT
Start: 2023-01-23 | End: 2023-01-23 | Stop reason: HOSPADM

## 2023-01-23 RX ORDER — DEXTROSE, SODIUM CHLORIDE, SODIUM LACTATE, POTASSIUM CHLORIDE, AND CALCIUM CHLORIDE 5; .6; .31; .03; .02 G/100ML; G/100ML; G/100ML; G/100ML; G/100ML
INJECTION, SOLUTION INTRAVENOUS CONTINUOUS
Status: DISCONTINUED | OUTPATIENT
Start: 2023-01-23 | End: 2023-01-23

## 2023-01-23 RX ORDER — ACETAMINOPHEN 500 MG
1000 TABLET ORAL EVERY 6 HOURS PRN
Qty: 30 TABLET | Refills: 0 | Status: SHIPPED | OUTPATIENT
Start: 2023-01-23

## 2023-01-23 RX ORDER — DEXAMETHASONE SODIUM PHOSPHATE 4 MG/ML
4 INJECTION, SOLUTION INTRA-ARTICULAR; INTRALESIONAL; INTRAMUSCULAR; INTRAVENOUS; SOFT TISSUE
Status: DISCONTINUED | OUTPATIENT
Start: 2023-01-23 | End: 2023-01-23 | Stop reason: HOSPADM

## 2023-01-23 RX ORDER — GLYCOPYRROLATE 0.2 MG/ML
INJECTION, SOLUTION INTRAMUSCULAR; INTRAVENOUS PRN
Status: DISCONTINUED | OUTPATIENT
Start: 2023-01-23 | End: 2023-01-23

## 2023-01-23 RX ORDER — MIDAZOLAM HYDROCHLORIDE 1 MG/ML
INJECTION, SOLUTION INTRAMUSCULAR; INTRAVENOUS PRN
Status: DISCONTINUED | OUTPATIENT
Start: 2023-01-23 | End: 2023-01-23

## 2023-01-23 RX ORDER — ONDANSETRON 4 MG/1
4 TABLET, ORALLY DISINTEGRATING ORAL
Status: ACTIVE | OUTPATIENT
Start: 2023-01-23 | End: 2023-01-24

## 2023-01-23 RX ORDER — BUPIVACAINE HYDROCHLORIDE 2.5 MG/ML
INJECTION, SOLUTION EPIDURAL; INFILTRATION; INTRACAUDAL PRN
Status: DISCONTINUED | OUTPATIENT
Start: 2023-01-23 | End: 2023-01-23 | Stop reason: HOSPADM

## 2023-01-23 RX ORDER — OXYCODONE HYDROCHLORIDE 5 MG/1
5 TABLET ORAL EVERY 4 HOURS PRN
Qty: 20 TABLET | Refills: 0 | Status: SHIPPED | OUTPATIENT
Start: 2023-01-23 | End: 2023-02-01 | Stop reason: ALTCHOICE

## 2023-01-23 RX ORDER — SODIUM CHLORIDE, SODIUM LACTATE, POTASSIUM CHLORIDE, CALCIUM CHLORIDE 600; 310; 30; 20 MG/100ML; MG/100ML; MG/100ML; MG/100ML
INJECTION, SOLUTION INTRAVENOUS CONTINUOUS PRN
Status: DISCONTINUED | OUTPATIENT
Start: 2023-01-23 | End: 2023-01-23

## 2023-01-23 RX ORDER — LORAZEPAM 1 MG/1
1 TABLET ORAL ONCE
Status: COMPLETED | OUTPATIENT
Start: 2023-01-24 | End: 2023-01-24

## 2023-01-23 RX ORDER — HYDROCODONE BITARTRATE AND ACETAMINOPHEN 5; 325 MG/1; MG/1
1 TABLET ORAL
Status: DISCONTINUED | OUTPATIENT
Start: 2023-01-23 | End: 2023-01-23 | Stop reason: HOSPADM

## 2023-01-23 RX ORDER — ACETAMINOPHEN 325 MG/1
650 TABLET ORAL
Status: ACTIVE | OUTPATIENT
Start: 2023-01-23 | End: 2023-01-24

## 2023-01-23 RX ORDER — CEFAZOLIN SODIUM/WATER 2 G/20 ML
2000 SYRINGE (ML) INTRAVENOUS ONCE
Status: COMPLETED | OUTPATIENT
Start: 2023-01-23 | End: 2023-01-23

## 2023-01-23 RX ORDER — METOCLOPRAMIDE HYDROCHLORIDE 5 MG/ML
5 INJECTION INTRAMUSCULAR; INTRAVENOUS EVERY 6 HOURS PRN
Status: DISCONTINUED | OUTPATIENT
Start: 2023-01-23 | End: 2023-01-23 | Stop reason: HOSPADM

## 2023-01-23 RX ORDER — ONDANSETRON 2 MG/ML
INJECTION INTRAMUSCULAR; INTRAVENOUS PRN
Status: DISCONTINUED | OUTPATIENT
Start: 2023-01-23 | End: 2023-01-23

## 2023-01-23 RX ORDER — KETOROLAC TROMETHAMINE 15 MG/ML
15 INJECTION, SOLUTION INTRAMUSCULAR; INTRAVENOUS ONCE
Status: COMPLETED | OUTPATIENT
Start: 2023-01-23 | End: 2023-01-23

## 2023-01-23 RX ORDER — ONDANSETRON 2 MG/ML
4 INJECTION INTRAMUSCULAR; INTRAVENOUS 2 TIMES DAILY PRN
Status: DISCONTINUED | OUTPATIENT
Start: 2023-01-23 | End: 2023-01-23 | Stop reason: HOSPADM

## 2023-01-23 RX ORDER — OXYCODONE HYDROCHLORIDE 5 MG/1
5 TABLET ORAL EVERY 4 HOURS PRN
Status: DISCONTINUED | OUTPATIENT
Start: 2023-01-23 | End: 2023-01-24 | Stop reason: HOSPADM

## 2023-01-23 RX ORDER — NEOSTIGMINE METHYLSULFATE 4 MG/4 ML
SYRINGE (ML) INTRAVENOUS PRN
Status: DISCONTINUED | OUTPATIENT
Start: 2023-01-23 | End: 2023-01-23

## 2023-01-23 RX ORDER — TRAMADOL HYDROCHLORIDE 50 MG/1
50 TABLET ORAL EVERY 6 HOURS PRN
Status: DISCONTINUED | OUTPATIENT
Start: 2023-01-23 | End: 2023-01-24 | Stop reason: HOSPADM

## 2023-01-23 RX ORDER — DEXAMETHASONE SODIUM PHOSPHATE 4 MG/ML
INJECTION, SOLUTION INTRA-ARTICULAR; INTRALESIONAL; INTRAMUSCULAR; INTRAVENOUS; SOFT TISSUE PRN
Status: DISCONTINUED | OUTPATIENT
Start: 2023-01-23 | End: 2023-01-23

## 2023-01-23 RX ORDER — ALBUTEROL SULFATE 2.5 MG/3ML
2.5 SOLUTION RESPIRATORY (INHALATION)
Status: DISCONTINUED | OUTPATIENT
Start: 2023-01-23 | End: 2023-01-23 | Stop reason: HOSPADM

## 2023-01-23 RX ORDER — MIDAZOLAM HYDROCHLORIDE 1 MG/ML
2 INJECTION, SOLUTION INTRAMUSCULAR; INTRAVENOUS ONCE
Status: DISCONTINUED | OUTPATIENT
Start: 2023-01-23 | End: 2023-01-24 | Stop reason: HOSPADM

## 2023-01-23 RX ORDER — ONDANSETRON 2 MG/ML
4 INJECTION INTRAMUSCULAR; INTRAVENOUS
Status: ACTIVE | OUTPATIENT
Start: 2023-01-23 | End: 2023-01-24

## 2023-01-23 RX ORDER — HYDRALAZINE HYDROCHLORIDE 20 MG/ML
5 INJECTION INTRAMUSCULAR; INTRAVENOUS EVERY 10 MIN PRN
Status: DISCONTINUED | OUTPATIENT
Start: 2023-01-23 | End: 2023-01-23 | Stop reason: HOSPADM

## 2023-01-23 RX ORDER — PROPOFOL 10 MG/ML
INJECTION, EMULSION INTRAVENOUS PRN
Status: DISCONTINUED | OUTPATIENT
Start: 2023-01-23 | End: 2023-01-23

## 2023-01-23 RX ORDER — METOCLOPRAMIDE HYDROCHLORIDE 5 MG/ML
5 INJECTION INTRAMUSCULAR; INTRAVENOUS
Status: ACTIVE | OUTPATIENT
Start: 2023-01-23 | End: 2023-01-24

## 2023-01-23 RX ORDER — GABAPENTIN 300 MG/1
1 CAPSULE ORAL PRN
COMMUNITY

## 2023-01-23 RX ORDER — HUMAN INSULIN 100 [IU]/ML
INJECTION, SOLUTION SUBCUTANEOUS
Status: DISCONTINUED | OUTPATIENT
Start: 2023-01-23 | End: 2023-01-23 | Stop reason: HOSPADM

## 2023-01-23 RX ORDER — ACETAMINOPHEN 500 MG
1000 TABLET ORAL EVERY 6 HOURS
Status: DISCONTINUED | OUTPATIENT
Start: 2023-01-23 | End: 2023-01-24 | Stop reason: HOSPADM

## 2023-01-23 RX ADMIN — Medication 2000 MG: at 16:00

## 2023-01-23 RX ADMIN — FENTANYL CITRATE 50 MCG: 50 INJECTION INTRAMUSCULAR; INTRAVENOUS at 21:35

## 2023-01-23 RX ADMIN — ONDANSETRON 4 MG: 2 INJECTION INTRAMUSCULAR; INTRAVENOUS at 20:57

## 2023-01-23 RX ADMIN — ACETAMINOPHEN 1000 MG: 500 TABLET ORAL at 22:36

## 2023-01-23 RX ADMIN — SODIUM CHLORIDE 1000 ML: 9 INJECTION, SOLUTION INTRAVENOUS at 12:37

## 2023-01-23 RX ADMIN — MORPHINE SULFATE 2 MG: 2 INJECTION, SOLUTION INTRAMUSCULAR; INTRAVENOUS at 16:02

## 2023-01-23 RX ADMIN — MIDAZOLAM HYDROCHLORIDE 2 MG: 1 INJECTION, SOLUTION INTRAMUSCULAR; INTRAVENOUS at 19:57

## 2023-01-23 RX ADMIN — KETOROLAC TROMETHAMINE 15 MG: 15 INJECTION, SOLUTION INTRAMUSCULAR; INTRAVENOUS at 12:41

## 2023-01-23 RX ADMIN — DEXAMETHASONE SODIUM PHOSPHATE 4 MG: 4 INJECTION, SOLUTION INTRAMUSCULAR; INTRAVENOUS at 20:09

## 2023-01-23 RX ADMIN — ROCURONIUM BROMIDE 50 MG: 10 INJECTION, SOLUTION INTRAVENOUS at 20:05

## 2023-01-23 RX ADMIN — FENTANYL CITRATE 50 MCG: 50 INJECTION, SOLUTION INTRAMUSCULAR; INTRAVENOUS at 20:27

## 2023-01-23 RX ADMIN — CEFAZOLIN SODIUM 2000 MG: 300 INJECTION, POWDER, LYOPHILIZED, FOR SOLUTION INTRAVENOUS at 20:10

## 2023-01-23 RX ADMIN — FENTANYL CITRATE 50 MCG: 50 INJECTION INTRAMUSCULAR; INTRAVENOUS at 21:43

## 2023-01-23 RX ADMIN — OXYCODONE HYDROCHLORIDE 5 MG: 5 TABLET ORAL at 22:51

## 2023-01-23 RX ADMIN — Medication 5 MG: at 20:57

## 2023-01-23 RX ADMIN — MORPHINE SULFATE 2 MG: 2 INJECTION, SOLUTION INTRAMUSCULAR; INTRAVENOUS at 23:41

## 2023-01-23 RX ADMIN — SODIUM CHLORIDE, SODIUM LACTATE, POTASSIUM CHLORIDE, CALCIUM CHLORIDE AND DEXTROSE MONOHYDRATE: 5; 600; 310; 30; 20 INJECTION, SOLUTION INTRAVENOUS at 18:10

## 2023-01-23 RX ADMIN — GLYCOPYRROLATE 1 MG: 0.2 INJECTION, SOLUTION INTRAMUSCULAR; INTRAVENOUS at 20:57

## 2023-01-23 RX ADMIN — KETOROLAC TROMETHAMINE 30 MG: 30 INJECTION, SOLUTION INTRAMUSCULAR at 20:54

## 2023-01-23 RX ADMIN — FENTANYL CITRATE 50 MCG: 50 INJECTION, SOLUTION INTRAMUSCULAR; INTRAVENOUS at 19:57

## 2023-01-23 RX ADMIN — PROPOFOL 200 MG: 10 INJECTION, EMULSION INTRAVENOUS at 20:05

## 2023-01-23 RX ADMIN — METRONIDAZOLE 500 MG: 500 INJECTION, SOLUTION INTRAVENOUS at 17:12

## 2023-01-23 RX ADMIN — IOHEXOL 85 ML: 350 INJECTION, SOLUTION INTRAVENOUS at 14:37

## 2023-01-23 RX ADMIN — SODIUM CHLORIDE, POTASSIUM CHLORIDE, SODIUM LACTATE AND CALCIUM CHLORIDE: 600; 310; 30; 20 INJECTION, SOLUTION INTRAVENOUS at 19:52

## 2023-01-23 RX ADMIN — ROCURONIUM BROMIDE 20 MG: 10 INJECTION, SOLUTION INTRAVENOUS at 20:27

## 2023-01-23 SDOH — HEALTH STABILITY: GENERAL
BECAUSE OF A PHYSICAL, MENTAL, OR EMOTIONAL CONDITION, DO YOU HAVE SERIOUS DIFFICULTY CONCENTRATING, REMEMBERING OR MAKING DECISIONS?: NO

## 2023-01-23 SDOH — ECONOMIC STABILITY: HOUSING INSECURITY: WHAT IS YOUR LIVING SITUATION TODAY?: APARTMENT

## 2023-01-23 SDOH — ECONOMIC STABILITY: GENERAL

## 2023-01-23 SDOH — ECONOMIC STABILITY: TRANSPORTATION INSECURITY
IN THE PAST 12 MONTHS, HAS LACK OF TRANSPORTATION KEPT YOU FROM MEETINGS, WORK, OR FROM GETTING THINGS NEEDED FOR DAILY LIVING?: NO

## 2023-01-23 SDOH — SOCIAL STABILITY: SOCIAL NETWORK: SUPPORT SYSTEMS: PARENT;SIGNIFICANT OTHER;FAMILY MEMBERS;FRIENDS

## 2023-01-23 SDOH — HEALTH STABILITY: PHYSICAL HEALTH: DO YOU HAVE SERIOUS DIFFICULTY WALKING OR CLIMBING STAIRS?: NO

## 2023-01-23 SDOH — ECONOMIC STABILITY: HOUSING INSECURITY: ARE YOU WORRIED ABOUT LOSING YOUR HOUSING?: NO

## 2023-01-23 SDOH — ECONOMIC STABILITY: HOUSING INSECURITY: WHAT IS YOUR LIVING SITUATION TODAY?: SIGNIFICANT OTHER

## 2023-01-23 SDOH — ECONOMIC STABILITY: TRANSPORTATION INSECURITY
IN THE PAST 12 MONTHS, HAS THE LACK OF TRANSPORTATION KEPT YOU FROM MEDICAL APPOINTMENTS OR FROM GETTING MEDICATIONS?: NO

## 2023-01-23 SDOH — HEALTH STABILITY: PHYSICAL HEALTH: DO YOU HAVE DIFFICULTY DRESSING OR BATHING?: NO

## 2023-01-23 SDOH — HEALTH STABILITY: GENERAL: BECAUSE OF A PHYSICAL, MENTAL, OR EMOTIONAL CONDITION, DO YOU HAVE DIFFICULTY DOING ERRANDS ALONE?: NO

## 2023-01-23 SDOH — ECONOMIC STABILITY: FOOD INSECURITY: HOW OFTEN IN THE PAST 12 MONTHS WERE YOU WORRIED OR STRESSED ABOUT HAVING ENOUGH MONEY TO BUY NUTRITIOUS MEALS?: NEVER

## 2023-01-23 ASSESSMENT — PAIN SCALES - GENERAL
PAINLEVEL_OUTOF10: 4
PAINLEVEL_OUTOF10: 6
PAINLEVEL_OUTOF10: 8
PAINLEVEL_OUTOF10: 8
PAINLEVEL_OUTOF10: 2
PAINLEVEL_OUTOF10: 8
PAINLEVEL_OUTOF10: 9
PAINLEVEL_OUTOF10: 7
PAINLEVEL_OUTOF10: 0
PAINLEVEL_OUTOF10: 8

## 2023-01-23 ASSESSMENT — PATIENT HEALTH QUESTIONNAIRE - PHQ9
IS PATIENT ABLE TO COMPLETE PHQ2 OR PHQ9: YES
CLINICAL INTERPRETATION OF PHQ2 SCORE: NO FURTHER SCREENING NEEDED
SUM OF ALL RESPONSES TO PHQ9 QUESTIONS 1 AND 2: 0

## 2023-01-24 VITALS
TEMPERATURE: 98.1 F | OXYGEN SATURATION: 98 % | HEART RATE: 80 BPM | RESPIRATION RATE: 18 BRPM | DIASTOLIC BLOOD PRESSURE: 64 MMHG | WEIGHT: 264.77 LBS | HEIGHT: 73 IN | BODY MASS INDEX: 35.09 KG/M2 | SYSTOLIC BLOOD PRESSURE: 137 MMHG

## 2023-01-24 PROCEDURE — G0378 HOSPITAL OBSERVATION PER HR: HCPCS

## 2023-01-24 PROCEDURE — 99024 POSTOP FOLLOW-UP VISIT: CPT | Performed by: NURSE PRACTITIONER

## 2023-01-24 PROCEDURE — 10002803 HB RX 637

## 2023-01-24 PROCEDURE — 10004180 HB COUNTER-TRANSPORT

## 2023-01-24 PROCEDURE — 10002016 HB COUNTER INCENTIVE SPIROMETRY

## 2023-01-24 PROCEDURE — 10002803 HB RX 637: Performed by: NURSE PRACTITIONER

## 2023-01-24 PROCEDURE — 10002800 HB RX 250 W HCPCS

## 2023-01-24 PROCEDURE — 10004651 HB RX, NO CHARGE ITEM

## 2023-01-24 PROCEDURE — 13001086 HB INCENTIVE SPIROMETER W INSTRUCT

## 2023-01-24 PROCEDURE — 96376 TX/PRO/DX INJ SAME DRUG ADON: CPT

## 2023-01-24 RX ORDER — PANTOPRAZOLE SODIUM 40 MG/1
40 TABLET, DELAYED RELEASE ORAL
Status: DISCONTINUED | OUTPATIENT
Start: 2023-01-24 | End: 2023-01-24 | Stop reason: HOSPADM

## 2023-01-24 RX ORDER — ALPRAZOLAM 1 MG/1
2 TABLET ORAL DAILY
Status: DISCONTINUED | OUTPATIENT
Start: 2023-01-24 | End: 2023-01-24 | Stop reason: HOSPADM

## 2023-01-24 RX ADMIN — PANTOPRAZOLE SODIUM 40 MG: 40 TABLET, DELAYED RELEASE ORAL at 08:36

## 2023-01-24 RX ADMIN — MORPHINE SULFATE 2 MG: 2 INJECTION, SOLUTION INTRAMUSCULAR; INTRAVENOUS at 05:26

## 2023-01-24 RX ADMIN — LORAZEPAM 1 MG: 1 TABLET ORAL at 00:42

## 2023-01-24 RX ADMIN — ACETAMINOPHEN 1000 MG: 500 TABLET ORAL at 05:20

## 2023-01-24 RX ADMIN — ALPRAZOLAM 2 MG: 1 TABLET ORAL at 08:36

## 2023-01-24 SDOH — SOCIAL STABILITY: SOCIAL NETWORK
HOW OFTEN DO YOU SEE OR TALK TO PEOPLE THAT YOU CARE ABOUT AND FEEL CLOSE TO? (FOR EXAMPLE: TALKING TO FRIENDS ON THE PHONE, VISITING FRIENDS OR FAMILY, GOING TO CHURCH OR CLUB MEETINGS): 5 OR MORE TIMES A WEEK

## 2023-01-24 ASSESSMENT — PAIN SCALES - GENERAL
PAINLEVEL_OUTOF10: 4
PAINLEVEL_OUTOF10: 0
PAINLEVEL_OUTOF10: 7
PAINLEVEL_OUTOF10: 3
PAINLEVEL_OUTOF10: 8

## 2023-01-24 ASSESSMENT — ACTIVITIES OF DAILY LIVING (ADL)
RECENT_DECLINE_ADL: NO
ADL_SCORE: 12
ADL_SHORT_OF_BREATH: NO
ADL_BEFORE_ADMISSION: INDEPENDENT

## 2023-01-24 ASSESSMENT — COGNITIVE AND FUNCTIONAL STATUS - GENERAL
BECAUSE OF A PHYSICAL, MENTAL, OR EMOTIONAL CONDITION, DO YOU HAVE DIFFICULTY DOING ERRANDS ALONE: NO
DO YOU HAVE DIFFICULTY DRESSING OR BATHING: NO
BECAUSE OF A PHYSICAL, MENTAL, OR EMOTIONAL CONDITION, DO YOU HAVE SERIOUS DIFFICULTY CONCENTRATING, REMEMBERING OR MAKING DECISIONS: NO
DO YOU HAVE SERIOUS DIFFICULTY WALKING OR CLIMBING STAIRS: NO

## 2023-01-25 ENCOUNTER — TELEPHONE (OUTPATIENT)
Dept: SURGERY | Age: 28
End: 2023-01-25

## 2023-01-26 LAB
ASR DISCLAIMER: NORMAL
CASE RPRT: NORMAL
CLINICAL INFO: NORMAL
PATH REPORT.FINAL DX SPEC: NORMAL
PATH REPORT.GROSS SPEC: NORMAL

## 2023-01-31 PROBLEM — Z90.49 S/P LAPAROSCOPIC APPENDECTOMY: Status: ACTIVE | Noted: 2023-01-31

## 2023-02-01 ENCOUNTER — OFFICE VISIT (OUTPATIENT)
Dept: SURGERY | Age: 28
End: 2023-02-01

## 2023-02-01 VITALS — SYSTOLIC BLOOD PRESSURE: 130 MMHG | OXYGEN SATURATION: 97 % | HEART RATE: 91 BPM | DIASTOLIC BLOOD PRESSURE: 81 MMHG

## 2023-02-01 DIAGNOSIS — Z90.49 S/P LAPAROSCOPIC APPENDECTOMY: Primary | ICD-10-CM

## 2023-02-01 PROCEDURE — 99024 POSTOP FOLLOW-UP VISIT: CPT

## 2023-02-01 RX ORDER — TRAMADOL HYDROCHLORIDE 50 MG/1
50 TABLET ORAL EVERY 6 HOURS PRN
Qty: 15 TABLET | Refills: 0 | Status: SHIPPED | OUTPATIENT
Start: 2023-02-01

## 2023-02-01 RX ORDER — POLYETHYLENE GLYCOL 3350, SODIUM SULFATE ANHYDROUS, SODIUM BICARBONATE, SODIUM CHLORIDE, POTASSIUM CHLORIDE 236; 22.74; 6.74; 5.86; 2.97 G/4L; G/4L; G/4L; G/4L; G/4L
POWDER, FOR SOLUTION ORAL
COMMUNITY
Start: 2022-11-21

## 2023-02-01 ASSESSMENT — PAIN SCALES - GENERAL: PAINLEVEL: 9

## 2023-11-14 ENCOUNTER — OFFICE VISIT (OUTPATIENT)
Dept: NEUROLOGY | Facility: CLINIC | Age: 28
End: 2023-11-14
Payer: MEDICAID

## 2023-11-14 VITALS
BODY MASS INDEX: 33.75 KG/M2 | DIASTOLIC BLOOD PRESSURE: 68 MMHG | HEART RATE: 94 BPM | RESPIRATION RATE: 16 BRPM | WEIGHT: 263 LBS | SYSTOLIC BLOOD PRESSURE: 108 MMHG | HEIGHT: 74 IN

## 2023-11-14 DIAGNOSIS — M54.12 CERVICAL RADICULOPATHY: ICD-10-CM

## 2023-11-14 DIAGNOSIS — S16.1XXS STRAIN OF NECK MUSCLE, SEQUELA: ICD-10-CM

## 2023-11-14 DIAGNOSIS — M54.16 LUMBAR RADICULOPATHY: Primary | ICD-10-CM

## 2023-11-14 PROCEDURE — 3008F BODY MASS INDEX DOCD: CPT | Performed by: OTHER

## 2023-11-14 PROCEDURE — 99214 OFFICE O/P EST MOD 30 MIN: CPT | Performed by: OTHER

## 2023-11-14 PROCEDURE — 3074F SYST BP LT 130 MM HG: CPT | Performed by: OTHER

## 2023-11-14 PROCEDURE — 3078F DIAST BP <80 MM HG: CPT | Performed by: OTHER

## 2023-11-14 RX ORDER — ALPRAZOLAM 2 MG/1
1 TABLET ORAL AS DIRECTED
COMMUNITY

## 2023-11-14 RX ORDER — HYDROXYZINE HYDROCHLORIDE 25 MG/1
TABLET, FILM COATED ORAL NIGHTLY PRN
COMMUNITY
Start: 2023-05-31

## 2023-11-14 RX ORDER — BUSPIRONE HYDROCHLORIDE 10 MG/1
10 TABLET ORAL 2 TIMES DAILY
COMMUNITY
Start: 2023-05-30

## 2023-11-14 RX ORDER — ESCITALOPRAM OXALATE 20 MG/1
20 TABLET ORAL EVERY MORNING
COMMUNITY
Start: 2023-07-01

## 2023-12-12 ENCOUNTER — TELEPHONE (OUTPATIENT)
Dept: NEUROLOGY | Facility: CLINIC | Age: 28
End: 2023-12-12

## 2023-12-12 NOTE — TELEPHONE ENCOUNTER
Per Isiah insurance is looking for a 6wks executive PT for pt before approving testing     Per [facility] pt is aware of this and he will be calling our office

## 2024-01-19 ENCOUNTER — APPOINTMENT (OUTPATIENT)
Dept: FAMILY MEDICINE | Age: 29
End: 2024-01-19

## 2024-01-23 ENCOUNTER — APPOINTMENT (OUTPATIENT)
Dept: FAMILY MEDICINE | Age: 29
End: 2024-01-23

## 2024-01-23 PROBLEM — K35.30 ACUTE APPENDICITIS WITH LOCALIZED PERITONITIS, WITHOUT PERFORATION, ABSCESS, OR GANGRENE: Status: RESOLVED | Noted: 2023-01-23 | Resolved: 2024-01-23

## 2024-01-23 PROBLEM — K29.00 ACUTE GASTRITIS WITHOUT HEMORRHAGE: Status: ACTIVE | Noted: 2024-01-23

## 2024-01-23 PROBLEM — K62.5 RECTAL BLEEDING: Status: ACTIVE | Noted: 2024-01-23

## 2024-01-23 PROBLEM — K27.9 PUD (PEPTIC ULCER DISEASE): Status: ACTIVE | Noted: 2024-01-23

## 2024-01-23 PROBLEM — F41.9 ANXIETY: Status: ACTIVE | Noted: 2024-01-23

## 2024-01-23 PROBLEM — K58.1 IRRITABLE BOWEL SYNDROME WITH CONSTIPATION: Status: ACTIVE | Noted: 2024-01-23

## 2024-01-23 PROBLEM — M54.16 LUMBAR RADICULOPATHY: Status: ACTIVE | Noted: 2020-08-05

## 2024-04-17 ENCOUNTER — HOSPITAL ENCOUNTER (EMERGENCY)
Age: 29
Discharge: HOME | End: 2024-04-17
Attending: EMERGENCY MEDICINE

## 2024-04-17 ENCOUNTER — APPOINTMENT (OUTPATIENT)
Dept: CT IMAGING | Age: 29
End: 2024-04-17
Attending: EMERGENCY MEDICINE

## 2024-04-17 VITALS
OXYGEN SATURATION: 99 % | WEIGHT: 278.44 LBS | RESPIRATION RATE: 16 BRPM | TEMPERATURE: 98.6 F | HEART RATE: 55 BPM | HEIGHT: 73 IN | SYSTOLIC BLOOD PRESSURE: 127 MMHG | DIASTOLIC BLOOD PRESSURE: 62 MMHG | BODY MASS INDEX: 36.9 KG/M2

## 2024-04-17 DIAGNOSIS — S39.012A STRAIN OF LUMBAR REGION, INITIAL ENCOUNTER: Primary | ICD-10-CM

## 2024-04-17 DIAGNOSIS — R10.84 GENERALIZED ABDOMINAL PAIN: ICD-10-CM

## 2024-04-17 LAB
ALBUMIN SERPL-MCNC: 4.1 G/DL (ref 3.6–5.1)
ALP SERPL-CCNC: 59 UNITS/L (ref 45–117)
ALT SERPL-CCNC: 31 UNITS/L
ANION GAP SERPL CALC-SCNC: 8 MMOL/L (ref 7–19)
APPEARANCE UR: CLEAR
AST SERPL-CCNC: 23 UNITS/L
BACTERIA #/AREA URNS HPF: NORMAL /HPF
BASOPHILS # BLD: 0 K/MCL (ref 0–0.3)
BASOPHILS NFR BLD: 0 %
BILIRUB CONJ SERPL-MCNC: <0.1 MG/DL (ref 0–0.2)
BILIRUB SERPL-MCNC: 0.5 MG/DL (ref 0.2–1)
BILIRUB UR QL STRIP: NEGATIVE
BUN SERPL-MCNC: 15 MG/DL (ref 6–20)
BUN/CREAT SERPL: 15 (ref 7–25)
CALCIUM SERPL-MCNC: 9.3 MG/DL (ref 8.4–10.2)
CHLORIDE SERPL-SCNC: 106 MMOL/L (ref 97–110)
CO2 SERPL-SCNC: 26 MMOL/L (ref 21–32)
COLOR UR: COLORLESS
CREAT SERPL-MCNC: 1.02 MG/DL (ref 0.67–1.17)
DEPRECATED RDW RBC: 41.4 FL (ref 39–50)
EGFRCR SERPLBLD CKD-EPI 2021: >90 ML/MIN/{1.73_M2}
EOSINOPHIL # BLD: 0.1 K/MCL (ref 0–0.5)
EOSINOPHIL NFR BLD: 1 %
ERYTHROCYTE [DISTWIDTH] IN BLOOD: 12.7 % (ref 11–15)
FASTING DURATION TIME PATIENT: NORMAL H
FLUAV RNA RESP QL NAA+PROBE: NOT DETECTED
FLUBV RNA RESP QL NAA+PROBE: NOT DETECTED
GLUCOSE SERPL-MCNC: 98 MG/DL (ref 70–99)
GLUCOSE UR STRIP-MCNC: NEGATIVE MG/DL
HCT VFR BLD CALC: 46 % (ref 39–51)
HGB BLD-MCNC: 15.7 G/DL (ref 13–17)
HGB UR QL STRIP: NEGATIVE
HYALINE CASTS #/AREA URNS LPF: NORMAL /LPF
IMM GRANULOCYTES # BLD AUTO: 0 K/MCL (ref 0–0.2)
IMM GRANULOCYTES # BLD: 1 %
KETONES UR STRIP-MCNC: NEGATIVE MG/DL
LEUKOCYTE ESTERASE UR QL STRIP: NEGATIVE
LIPASE SERPL-CCNC: 27 UNITS/L (ref 15–77)
LYMPHOCYTES # BLD: 1.9 K/MCL (ref 1–4.8)
LYMPHOCYTES NFR BLD: 28 %
MCH RBC QN AUTO: 31.1 PG (ref 26–34)
MCHC RBC AUTO-ENTMCNC: 34.1 G/DL (ref 32–36.5)
MCV RBC AUTO: 91.1 FL (ref 78–100)
MONOCYTES # BLD: 0.4 K/MCL (ref 0.3–0.9)
MONOCYTES NFR BLD: 6 %
NEUTROPHILS # BLD: 4.5 K/MCL (ref 1.8–7.7)
NEUTROPHILS NFR BLD: 64 %
NITRITE UR QL STRIP: NEGATIVE
NRBC BLD MANUAL-RTO: 0 /100 WBC
PH UR STRIP: 6.5 [PH] (ref 5–7)
PLATELET # BLD AUTO: 274 K/MCL (ref 140–450)
POTASSIUM SERPL-SCNC: 4.2 MMOL/L (ref 3.4–5.1)
PROT SERPL-MCNC: 7.6 G/DL (ref 6.4–8.2)
PROT UR STRIP-MCNC: NEGATIVE MG/DL
RAINBOW EXTRA TUBES HOLD SPECIMEN: NORMAL
RBC # BLD: 5.05 MIL/MCL (ref 4.5–5.9)
RBC #/AREA URNS HPF: NORMAL /HPF
RSV AG NPH QL IA.RAPID: NOT DETECTED
SARS-COV-2 RNA RESP QL NAA+PROBE: NOT DETECTED
SERVICE CMNT-IMP: NORMAL
SERVICE CMNT-IMP: NORMAL
SODIUM SERPL-SCNC: 136 MMOL/L (ref 135–145)
SP GR UR STRIP: 1.01 (ref 1–1.03)
SQUAMOUS #/AREA URNS HPF: NORMAL /HPF
UROBILINOGEN UR STRIP-MCNC: 0.2 MG/DL
WBC # BLD: 7 K/MCL (ref 4.2–11)
WBC #/AREA URNS HPF: NORMAL /HPF

## 2024-04-17 PROCEDURE — 80048 BASIC METABOLIC PNL TOTAL CA: CPT | Performed by: EMERGENCY MEDICINE

## 2024-04-17 PROCEDURE — 0241U COVID/FLU/RSV PANEL: CPT | Performed by: EMERGENCY MEDICINE

## 2024-04-17 PROCEDURE — 10002805 HB CONTRAST AGENT: Performed by: EMERGENCY MEDICINE

## 2024-04-17 PROCEDURE — 85025 COMPLETE CBC W/AUTO DIFF WBC: CPT | Performed by: EMERGENCY MEDICINE

## 2024-04-17 PROCEDURE — 74177 CT ABD & PELVIS W/CONTRAST: CPT

## 2024-04-17 PROCEDURE — 83690 ASSAY OF LIPASE: CPT | Performed by: EMERGENCY MEDICINE

## 2024-04-17 PROCEDURE — 80076 HEPATIC FUNCTION PANEL: CPT | Performed by: EMERGENCY MEDICINE

## 2024-04-17 PROCEDURE — 10002800 HB RX 250 W HCPCS: Performed by: EMERGENCY MEDICINE

## 2024-04-17 PROCEDURE — 81001 URINALYSIS AUTO W/SCOPE: CPT | Performed by: EMERGENCY MEDICINE

## 2024-04-17 PROCEDURE — 10002807 HB RX 258: Performed by: EMERGENCY MEDICINE

## 2024-04-17 RX ORDER — KETOROLAC TROMETHAMINE 15 MG/ML
15 INJECTION, SOLUTION INTRAMUSCULAR; INTRAVENOUS ONCE
Status: COMPLETED | OUTPATIENT
Start: 2024-04-17 | End: 2024-04-17

## 2024-04-17 RX ADMIN — KETOROLAC TROMETHAMINE 15 MG: 15 INJECTION, SOLUTION INTRAMUSCULAR; INTRAVENOUS at 10:56

## 2024-04-17 RX ADMIN — SODIUM CHLORIDE 1000 ML: 0.9 INJECTION, SOLUTION INTRAVENOUS at 10:58

## 2024-04-17 RX ADMIN — IOHEXOL 85 ML: 350 INJECTION, SOLUTION INTRAVENOUS at 12:04

## 2024-04-17 ASSESSMENT — PAIN DESCRIPTION - PAIN TYPE: TYPE: ACUTE PAIN

## 2024-04-17 ASSESSMENT — PAIN SCALES - GENERAL
PAINLEVEL_OUTOF10: 9
PAINLEVEL_OUTOF10: 9
PAINLEVEL_OUTOF10: 4

## 2024-04-30 ENCOUNTER — HOSPITAL ENCOUNTER (EMERGENCY)
Age: 29
Discharge: HOME | End: 2024-04-30
Attending: EMERGENCY MEDICINE

## 2024-04-30 ENCOUNTER — APPOINTMENT (OUTPATIENT)
Dept: GENERAL RADIOLOGY | Age: 29
End: 2024-04-30
Attending: EMERGENCY MEDICINE

## 2024-04-30 VITALS
TEMPERATURE: 98.2 F | DIASTOLIC BLOOD PRESSURE: 74 MMHG | HEIGHT: 73 IN | RESPIRATION RATE: 18 BRPM | WEIGHT: 272.27 LBS | SYSTOLIC BLOOD PRESSURE: 130 MMHG | OXYGEN SATURATION: 96 % | BODY MASS INDEX: 36.08 KG/M2 | HEART RATE: 71 BPM

## 2024-04-30 DIAGNOSIS — J06.9 UPPER RESPIRATORY TRACT INFECTION, UNSPECIFIED TYPE: Primary | ICD-10-CM

## 2024-04-30 LAB
FLUAV RNA RESP QL NAA+PROBE: NOT DETECTED
FLUBV RNA RESP QL NAA+PROBE: NOT DETECTED
RSV AG NPH QL IA.RAPID: NOT DETECTED
SARS-COV-2 RNA RESP QL NAA+PROBE: NOT DETECTED
SERVICE CMNT-IMP: NORMAL
SERVICE CMNT-IMP: NORMAL

## 2024-04-30 PROCEDURE — 0241U COVID/FLU/RSV PANEL: CPT | Performed by: EMERGENCY MEDICINE

## 2024-04-30 PROCEDURE — 71045 X-RAY EXAM CHEST 1 VIEW: CPT

## 2024-04-30 PROCEDURE — 99283 EMERGENCY DEPT VISIT LOW MDM: CPT

## 2024-04-30 PROCEDURE — 10002803 HB RX 637: Performed by: EMERGENCY MEDICINE

## 2024-04-30 RX ORDER — IBUPROFEN 600 MG/1
600 TABLET ORAL ONCE
Status: COMPLETED | OUTPATIENT
Start: 2024-04-30 | End: 2024-04-30

## 2024-04-30 RX ADMIN — IBUPROFEN 600 MG: 600 TABLET, FILM COATED ORAL at 07:12

## 2024-04-30 ASSESSMENT — PAIN SCALES - GENERAL
PAINLEVEL_OUTOF10: 10
PAINLEVEL_OUTOF10: 9
PAINLEVEL_OUTOF10: 7

## 2024-07-09 ENCOUNTER — TELEPHONE (OUTPATIENT)
Dept: NEUROLOGY | Facility: CLINIC | Age: 29
End: 2024-07-09
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
## 2024-07-09 NOTE — TELEPHONE ENCOUNTER
Shared with patient that office did not receive a denial letter. Patient asked to reach out to his insurance and find out why MRI was canceled and ask for PT requirements. Patient expressed understanding.

## 2024-08-01 ENCOUNTER — WALK IN (OUTPATIENT)
Dept: URGENT CARE | Age: 29
End: 2024-08-01
Attending: EMERGENCY MEDICINE

## 2024-08-01 VITALS
HEART RATE: 88 BPM | DIASTOLIC BLOOD PRESSURE: 77 MMHG | RESPIRATION RATE: 16 BRPM | BODY MASS INDEX: 35.62 KG/M2 | SYSTOLIC BLOOD PRESSURE: 118 MMHG | WEIGHT: 270 LBS | OXYGEN SATURATION: 97 % | TEMPERATURE: 97.9 F

## 2024-08-01 DIAGNOSIS — K04.7 DENTAL INFECTION: ICD-10-CM

## 2024-08-01 DIAGNOSIS — Z20.822 SUSPECTED COVID-19 VIRUS INFECTION: Primary | ICD-10-CM

## 2024-08-01 PROCEDURE — 0241U POCT COVID/FLU/RSV PANEL: CPT | Performed by: EMERGENCY MEDICINE

## 2024-08-01 RX ORDER — AMOXICILLIN AND CLAVULANATE POTASSIUM 875; 125 MG/1; MG/1
1 TABLET, FILM COATED ORAL 2 TIMES DAILY
Qty: 20 TABLET | Refills: 0 | Status: SHIPPED | OUTPATIENT
Start: 2024-08-01 | End: 2024-08-11

## 2024-08-01 ASSESSMENT — PAIN SCALES - GENERAL: PAINLEVEL: 8

## 2024-09-06 ENCOUNTER — HOSPITAL ENCOUNTER (EMERGENCY)
Age: 29
Discharge: HOME | End: 2024-09-06
Attending: EMERGENCY MEDICINE
Payer: MEDICAID

## 2024-09-06 ENCOUNTER — HOSPITAL ENCOUNTER (EMERGENCY)
Age: 29
Discharge: LEFT BEFORE DISCHARGE | End: 2024-09-06

## 2024-09-06 VITALS
HEIGHT: 74 IN | HEART RATE: 103 BPM | BODY MASS INDEX: 34.01 KG/M2 | DIASTOLIC BLOOD PRESSURE: 90 MMHG | OXYGEN SATURATION: 96 % | SYSTOLIC BLOOD PRESSURE: 137 MMHG | TEMPERATURE: 98 F | WEIGHT: 265 LBS | RESPIRATION RATE: 16 BRPM

## 2024-09-06 VITALS
HEIGHT: 73 IN | RESPIRATION RATE: 18 BRPM | BODY MASS INDEX: 37.05 KG/M2 | DIASTOLIC BLOOD PRESSURE: 81 MMHG | TEMPERATURE: 97.9 F | OXYGEN SATURATION: 98 % | SYSTOLIC BLOOD PRESSURE: 146 MMHG | HEART RATE: 111 BPM | WEIGHT: 279.54 LBS

## 2024-09-06 DIAGNOSIS — M54.40 BACK PAIN OF LUMBAR REGION WITH SCIATICA: Primary | ICD-10-CM

## 2024-09-06 PROCEDURE — 99283 EMERGENCY DEPT VISIT LOW MDM: CPT

## 2024-09-06 PROCEDURE — 96372 THER/PROPH/DIAG INJ SC/IM: CPT

## 2024-09-06 PROCEDURE — 99284 EMERGENCY DEPT VISIT MOD MDM: CPT

## 2024-09-06 RX ORDER — METHYLPREDNISOLONE 4 MG
TABLET, DOSE PACK ORAL
Qty: 1 EACH | Refills: 0 | Status: SHIPPED | OUTPATIENT
Start: 2024-09-06

## 2024-09-06 RX ORDER — HYDROCODONE BITARTRATE AND ACETAMINOPHEN 5; 325 MG/1; MG/1
1-2 TABLET ORAL EVERY 6 HOURS PRN
Qty: 10 TABLET | Refills: 0 | Status: SHIPPED | OUTPATIENT
Start: 2024-09-06 | End: 2024-09-11

## 2024-09-06 RX ORDER — CYCLOBENZAPRINE HCL 10 MG
10 TABLET ORAL ONCE
Status: COMPLETED | OUTPATIENT
Start: 2024-09-06 | End: 2024-09-06

## 2024-09-06 RX ORDER — HYDROCODONE BITARTRATE AND ACETAMINOPHEN 5; 325 MG/1; MG/1
1 TABLET ORAL ONCE
Status: COMPLETED | OUTPATIENT
Start: 2024-09-06 | End: 2024-09-06

## 2024-09-06 RX ORDER — CYCLOBENZAPRINE HCL 10 MG
10 TABLET ORAL 3 TIMES DAILY PRN
Qty: 20 TABLET | Refills: 0 | Status: SHIPPED | OUTPATIENT
Start: 2024-09-06 | End: 2024-09-13

## 2024-09-06 RX ORDER — KETOROLAC TROMETHAMINE 30 MG/ML
60 INJECTION, SOLUTION INTRAMUSCULAR; INTRAVENOUS ONCE
Status: COMPLETED | OUTPATIENT
Start: 2024-09-06 | End: 2024-09-06

## 2024-09-06 RX ORDER — OXYCODONE HYDROCHLORIDE 5 MG/1
5 CAPSULE ORAL EVERY 4 HOURS PRN
COMMUNITY
End: 2024-09-11 | Stop reason: ALTCHOICE

## 2024-09-06 NOTE — ED PROVIDER NOTES
Patient Seen in: [facility] [place]      History     Chief Complaint   Patient presents with    Back Pain     Stated Complaint: left sided sciatica pain .drove self here    Subjective:   HPI    29-year-old male presents for evaluation of left lower back pain for the past few days.  Pain is sharp and radiates down his left leg past his knee.  It is worse when walking and sitting, better when laying down.  No recent trauma.  However the patient was walking around a lot last weekend at a concert.  She has been suffering with some bit of pain for several months and is awaiting an outpatient MRI.  His primary doctor would not order the MRI without him going through physical therapy first    Objective:   Past Medical History:    Anxiety    Back pain    Back problem    Depression    History of stomach ulcers              Past Surgical History:   Procedure Laterality Date    Adenoidectomy      Appendectomy  03/2023    Create eardrum opening,gen anesth      Other      ear tubes    Removal adenoids,primary,12+ y/o      Removal of tonsils,12+ y/o      and ademoids    Tonsillectomy                  Social History     Socioeconomic History    Marital status: Single   Tobacco Use    Smoking status: Never     Passive exposure: Yes    Smokeless tobacco: Never   Vaping Use    Vaping status: Never Used   Substance and Sexual Activity    Alcohol use: Yes     Comment: weekends    Drug use: Yes     Frequency: 70.0 times per week     Types: Cannabis     Comment: 10 times per day   Other Topics Concern    Caffeine Concern No    Exercise No     Social Determinants of Health     Financial Resource Strain: Low Risk  (1/23/2023)    [facility], [facility]    Financial Resource Strain     In the past year, have you or any family members you live with been unable to get any of the following when it was really needed? Check all that apply.: None   Food Insecurity: No Food Insecurity  (1/23/2023)    [facility]    Food Insecurity     RETIRE How often in the past 12 months would you say you are worried or stressed about having enough money to buy nutritious meals? : Never   Transportation Needs: No Transportation Needs (1/23/2023)    [facility]    PRAPARE - Transportation     In the past 12 months, has lack of transportation kept you from medical appointments or from getting medications?: No     In the past 12 months, has lack of transportation kept you from meetings, work, or from getting things needed for daily living?: No   Social Connections: Socially Integrated (1/24/2023)    [facility]    Social Connections     How often do you see or talk to people that you care about and feel close to? (For example: talking to friends on the phone, visiting friends or family, going to Hinduism or club meetings): 5 or more times a week              Review of Systems    Positive for stated Chief Complaint: Back Pain    Other systems are as noted in HPI.  Constitutional and vital signs reviewed.      All other systems reviewed and negative except as noted above.    Physical Exam     ED Triage Vitals [09/06/24 1735]   /90   Pulse 103   Resp 16   Temp 98.3 °F (36.8 °C)   Temp src Temporal   SpO2 96 %   O2 Device None (Room air)       Current Vitals:   Vital Signs  BP: 137/90  Pulse: 103  Resp: 16  Temp: 98.3 °F (36.8 °C)  Temp src: Temporal    Oxygen Therapy  SpO2: 96 %  O2 Device: None (Room air)            Physical Exam    General: Alert, oriented, no apparent distress  HEENT:  Pupils equal reactive.  Extraocular motions intact. MMM.  Neck: Supple  Lungs: Clear to auscultation bilaterally.  Heart: Regular rate and rhythm.  Strong pedal pulses  Abdomen: Soft, nontender.   Skin: No rash.  No edema.  Neurologic: No focal neurologic  deficits.  Normal speech pattern  Musculoskeletal: Very mild left SI region TTP.  Full range of motion throughout.        ED Course   Labs Reviewed - No data to display                   MDM      Differential diagnosis includes lumbar radiculopathy, peripheral vascular disease, DVT    Medications   cyclobenzaprine (Flexeril) tab 10 mg (has no administration in time range)   HYDROcodone-acetaminophen (Norco) 5-325 MG per tab 1 tablet (has no administration in time range)   lidocaine-menthol 4-1 % patch 1 patch (1 patch Transdermal Patch Applied 9/6/24 1753)   ketorolac (Toradol) 60 MG/2ML IM injection 60 mg (60 mg Intramuscular Given 9/6/24 1753)       Patient is neurovascularly intact to both lower extremities.  History consistent with acute on chronic left-sided sciatica.               Plan for Medrol, Flexeril and Norco for breakthrough pain.  Patient given referral to spine/pain management to call next week.  Currently he has an order for PT but states he wants an MRI before PT but he will have to discuss this with specialist              Medical Decision Making      Disposition and Plan     Clinical Impression:  1. Back pain of lumbar region with sciatica         Disposition:  Discharge  9/6/2024  6:20 pm    Follow-up:  [place] [address] [place] [address] [place] [id number] [phone]  Call  choose option 2 for neurosurgery or pain follow up    [place] [address] [address] [place] [id number] [phone]  Call  choose option 2 for neurosurgery or pain follow up          Medications Prescribed:  Current Discharge Medication List        START taking these medications    Details   methylPREDNISolone (MEDROL) 4 MG Oral Tablet Therapy Pack Dosepack: take as directed  Qty: 1 each, Refills: 0      cyclobenzaprine 10 MG Oral Tab Take 1 tablet (10 mg total) by mouth 3 (three) times daily as  needed for Muscle spasms.  Qty: 20 tablet, Refills: 0      HYDROcodone-acetaminophen 5-325 MG Oral Tab Take 1-2 tablets by mouth every 6 (six) hours as needed for Pain.  Qty: 10 tablet, Refills: 0    Associated Diagnoses: Back pain of lumbar region with sciatica

## 2024-09-06 NOTE — DISCHARGE INSTRUCTIONS
Neurologic Instructions    Call your doctor if you have:  increased pain or headache.   trouble seeing, walking or using your arms or legs.   dizziness or passing out.   any change in behavior (agitated or sleepy).   trouble being awakened from sleep.   numbness in your face, arm or leg.   extreme weakness.   trouble talking.   nausea and vomiting.   any new or severe symptoms.    Take your medicines as prescribed. Most important, see a doctor again as discussed. If you have problems that we have not discussed, call or visit your doctor right away. If you cannot reach your doctor, return to the Emergency Department.   Neurologic Instructions    Call your doctor if you have:  increased pain or headache.   trouble seeing, walking or using your arms or legs.   dizziness or passing out.   any change in behavior (agitated or sleepy).   trouble being awakened from sleep.   numbness in your face, arm or leg.   extreme weakness.   trouble talking.   nausea and vomiting.   any new or severe symptoms.    Take your medicines as prescribed. Most important, see a doctor again as discussed. If you have problems that we have not discussed, call or visit your doctor right away. If you cannot reach your doctor, return to the Emergency Department.

## 2024-09-06 NOTE — ED INITIAL ASSESSMENT (HPI)
Reports he was walking around at a festival this past weekend and he has chronic back pain.  States past 3-4 days the pain became worse.  States pain and numbness in left leg.  Drove self to er

## 2024-09-11 ENCOUNTER — TELEPHONE (OUTPATIENT)
Dept: NEUROLOGY | Facility: CLINIC | Age: 29
End: 2024-09-11

## 2024-09-11 ENCOUNTER — HOSPITAL ENCOUNTER (OUTPATIENT)
Dept: GENERAL RADIOLOGY | Facility: HOSPITAL | Age: 29
Discharge: HOME | End: 2024-09-11
Attending: PHYSICIAN ASSISTANT
Payer: MEDICAID

## 2024-09-11 ENCOUNTER — OFFICE VISIT (OUTPATIENT)
Dept: PAIN CLINIC | Facility: CLINIC | Age: 29
End: 2024-09-11
Payer: MEDICAID

## 2024-09-11 VITALS
SYSTOLIC BLOOD PRESSURE: 124 MMHG | BODY MASS INDEX: 34 KG/M2 | DIASTOLIC BLOOD PRESSURE: 88 MMHG | OXYGEN SATURATION: 98 % | WEIGHT: 265 LBS | HEART RATE: 116 BPM

## 2024-09-11 DIAGNOSIS — M54.16 LUMBAR RADICULOPATHY: ICD-10-CM

## 2024-09-11 DIAGNOSIS — M54.16 LUMBAR RADICULOPATHY: Primary | ICD-10-CM

## 2024-09-11 PROCEDURE — 99204 OFFICE O/P NEW MOD 45 MIN: CPT | Performed by: PHYSICIAN ASSISTANT

## 2024-09-11 NOTE — PROGRESS NOTES
HDL low, other FLP WNL, LFTs ok  · Continue atorvastatin 80 (home med) on d/c Subjective:   Patient ID: Samuel Hill is a 29 year old male.    HPI    History/Other:   Review of Systems  Current Outpatient Medications   Medication Sig Dispense Refill   • oxyCODONE HCl 5 MG Oral Cap Take 1 capsule (5 mg total) by mouth every 4 (four) hours as needed. Its an old prescription     • methylPREDNISolone (MEDROL) 4 MG Oral Tablet Therapy Pack Dosepack: take as directed 1 each 0   • cyclobenzaprine 10 MG Oral Tab Take 1 tablet (10 mg total) by mouth 3 (three) times daily as needed for Muscle spasms. 20 tablet 0   • HYDROcodone-acetaminophen 5-325 MG Oral Tab Take 1-2 tablets by mouth every 6 (six) hours as needed for Pain. 10 tablet 0   • alprazolam 2 MG Oral Tab Take 1 tablet (2 mg total) by mouth As Directed.       Allergies:No Known Allergies    Objective:   Physical Exam  Constitutional:          Assessment & Plan:   No diagnosis found.    No orders of the defined types were placed in this encounter.      Meds This Visit:  Requested Prescriptions      No prescriptions requested or ordered in this encounter       Imaging & Referrals:  None      Location of Pain: low back, left side radiculopathy    Date Pain Began: 2 weeks ago          Work Related:   No        Receiving Work Comp/Disability:   No    Numeric Rating Scale:  Pain at Present:  10                                                                                                            (No Pain) 0  to  10 (Worst Pain)                 Minimum Pain:   10  Maximum Pain  10    Distribution of Pain:    left    Quality of Pain:   burning, numbness, sharp/stabbing, tingling, and shooting    Origin of Pain:    Other started hurting while working out    Aggravating Factors:    Sitting, Standing, and Walking    Past Treatments for Current Pain Condition:   Other none    Prior diagnostic testing for your pain:  none

## 2024-09-11 NOTE — PATIENT INSTRUCTIONS
Refill policies:    Allow 2-3 business days for refills; controlled substances may take longer.  Contact your pharmacy at least 5 days prior to running out of medication and have them send an electronic request or submit request through the “request refill” option in your [employer] account.  Refills are not addressed on weekends; covering physicians do not authorize routine medications on weekends.  No narcotics or controlled substances are refilled after noon on Fridays or by on call physicians.  By law, narcotics must be electronically prescribed.  A 30 day supply with no refills is the maximum allowed.  If your prescription is due for a refill, you may be due for a follow up appointment.  To best provide you care, patients receiving routine medications need to be seen at least once a year.  Patients receiving narcotic/controlled substance medications need to be seen at least once every 3 months.  In the event that your preferred pharmacy does not have the requested medication in stock (e.g. Backordered), it is your responsibility to find another pharmacy that has the requested medication available.  We will gladly send a new prescription to that pharmacy at your request.    Scheduling Tests:    If your physician has ordered radiology tests such as MRI or CT scans, please contact Central Scheduling at [phone] right away to schedule the test.  Once scheduled, the [facility] Centralized Referral Team will work with your insurance carrier to obtain pre-certification or prior authorization.  Depending on your insurance carrier, approval may take 3-10 days.  It is highly recommended patients assure they have received an authorization before having a test performed.  If test is done without insurance authorization, patient may be responsible for the entire amount billed.      Precertification and Prior Authorizations:  If your physician has recommended that you have a procedure or additional testing performed the [facility]  Centralized Referral Team will contact your insurance carrier to obtain pre-certification or prior authorization.    You are strongly encouraged to contact your insurance carrier to verify that your procedure/test has been approved and is a COVERED benefit.  Although the [facility] Centralized Referral Team does its due diligence, the insurance carrier gives the disclaimer that \"Although the procedure is authorized, this does not guarantee payment.\"    Ultimately the patient is responsible for payment.   Thank you for your understanding in this matter.  Paperwork Completion:  If you require FMLA or disability paperwork for your recovery, please make sure to either drop it off or have it faxed to our office at [phone]. Be sure the form has your name and date of birth on it.  The form will be faxed to our Forms Department and they will complete it for you.  There is a 25$ fee for all forms that need to be filled out.  Please be aware there is a 10-14 day turnaround time.  You will need to sign a release of information (ANA) form if your paperwork does not come with one.  You may call the Forms Department with any questions at [phone].  Their fax number is [phone].

## 2024-09-11 NOTE — PROGRESS NOTES
Patient: Samuel Hill  Medical Record Number: [id number]  Site: [facility]  Referring Physician:  ER  PCP:     Thank you very much for requesting this consultation. I had the opportunity to evaluate and initiate care for your patient today, as per your request.    HISTORY OF CHIEF COMPLAINT:      Samuel Hill is a 29 year old male, who complains of low back and L LE pain.    Patient is here today at the request of the emergency department with pain in above-described distribution that has been ongoing atraumatically for years.  He had been seen here in 2020, at which time he had reported onset of pain as far back as his late teenage years.  He had undergone bilateral facet injections, no change in pain.  Underwent LESI on 11/11/2020, from which, states that he had no change in pain.  For the past 4 years, simply lived with it, and used OTC meds.      Over the past 2 weeks, pain has significantly flared.  He was seen by [facility], though left AMA.  He went to another ER that day, [facility], and was discharged here for further options.  He was given MDP, flexeril, and norco, to no change in pain.      VAS Pain Score:  10/10    Aggravating Factors: Relieving Factors:   Standing   Sitting   Driving  Lying down  Nothing      Past Treatment Attempted/Patient’s Response:  As above     Past Medical History:    Anxiety    Back pain    Back problem    Depression    History of stomach ulcers      Past Surgical History:   Procedure Laterality Date    Adenoidectomy      Appendectomy  03/2023    Create eardrum opening,gen anesth      Other      ear tubes    Removal adenoids,primary,12+ y/o      Removal of tonsils,12+ y/o      and ademoids    Tonsillectomy        Family History   Problem Relation Age of Onset    Arthritis Mother     Diabetes Paternal Grandmother     Heart Disorder Maternal Grandmother     Heart Disorder Maternal Grandfather     Diabetes Maternal Grandfather       Social  History     Socioeconomic History    Marital status: Single   Tobacco Use    Smoking status: Never     Passive exposure: Yes    Smokeless tobacco: Never   Vaping Use    Vaping status: Never Used   Substance and Sexual Activity    Alcohol use: Yes     Comment: weekends    Drug use: Yes     Frequency: 70.0 times per week     Types: Cannabis     Comment: 10 times per day   Other Topics Concern    Caffeine Concern No    Exercise No      Current Medications:  Current Outpatient Medications   Medication Sig Dispense Refill    oxyCODONE HCl 5 MG Oral Cap Take 1 capsule (5 mg total) by mouth every 4 (four) hours as needed. Its an old prescription      methylPREDNISolone (MEDROL) 4 MG Oral Tablet Therapy Pack Dosepack: take as directed 1 each 0    cyclobenzaprine 10 MG Oral Tab Take 1 tablet (10 mg total) by mouth 3 (three) times daily as needed for Muscle spasms. 20 tablet 0    HYDROcodone-acetaminophen 5-325 MG Oral Tab Take 1-2 tablets by mouth every 6 (six) hours as needed for Pain. 10 tablet 0    alprazolam 2 MG Oral Tab Take 1 tablet (2 mg total) by mouth As Directed.          Functional Assessment: Patient reports that they are able to complete all of their ADL's such as eating, bathing, using the toilet, dressing and getting up from a bed or a chair independently.    Work History:  The patient currently works full time as .       REVIEW OF SYSTEMS:   10 point review of systems is otherwise negative,unless otherwise in HPI.      Radiology/Lab Test Reviewed:  no recent imaging.  MRI L spine 7/21/20:    IMPRESSION:   1. Mild straightening of the normal lumbar lordosis.   2. Minimal/mild lumbar spondylosis and disc disease, superimposed upon mild baseline congenital spinal canal narrowing. Mild L2-3 and L3-4 and minimal to mild L4-5 spinal canal narrowing.   3. Neural foraminal encroachment is moderate on the right at L5-S1, mild to moderate on the left at L5-S1, and mild bilaterally at L2-3 to L4-5.      CBC:    Lab Results   Component Value Date    WBC 12.4 05/22/2017    WBC 7.2 06/20/2016    WBC 7.4 05/29/2013   No results found for: \"HEMOGLOBIN\"  Lab Results   Component Value Date    .0 05/22/2017    .0 06/20/2016    .0 05/29/2013       PHYSICAL EXAMIMATION   PHYSICAL EXAMINATION: Samuel Hill is a 29 year old male who is observed sitting comfortably on a chair in the exam room alert and oriented times three. He looks consistent with his stated age.    Ht Readings from Last 1 Encounters:   09/06/24 74\"     Wt Readings from Last 1 Encounters:   09/06/24 265 lb (120.2 kg)     The patient is well developed, well nourished, well muscled, obese. He moves independently from sitting to standing with ease.       Inspection:  No acute distress    Patient displays Antalgic gait.    Coordination:  Well-coordinated, fluent gait, able to engage in rapid alternating movements of upper and lower extremities.    ROM Lumbar Spine:  See chart below:  Motion Right (+ or -) Left (+ or -)   Lumbar Flexion + +   Lumbar Extension + +   Lumbar Bending - -   Lumbar Extension/ twisting motion - -     Lumbar/Sacral Integument:  Skin over lumbar sacral spine is intact without rashes, excoriations, lesions or erythema noted    Palpation:  See chart below:  Palpation of lumbar area Right (+ or -) Left (+ or -)   Lumbar facets - -   Lumbar paraspinals - -   Piriformis - -   SIJ - -   Trochanteric Bursa - -     Strength:  Strength deficits noted:  weakness B dorsiflexion and EHL (4-/5); 5/5 otherwise     Sensation:  No sensory deficits noted on bilateral lower extremities to light touch    Tests:  Test Right (+ or -) Left (+ or -)   SLR + on L +   Sonu’s     Babinski     Clonus       HEAD/NECK: Head is normocephalic, neck supple  EYES: EOMI, MICHELLE  SKIN EXAM: Skin is intact, head, neck, trunk and arms/legs. No rashes, mottling or ulcerations.  LYMPH EXAM: There is no lymph edema in either lower  extremity.  VASCULAR EXAM: Pedal pulses are normal bilaterally, with good distal perfusion. No clubbing or cyanosis.  ABDOMINAL EXAM: Abdomen is soft without masses palpated.  HEART: normal, regular, S1 and S2  LUNGS:CTA  MUSCULOSKELETAL: Smooth, pain-free ROM to bilat hips, ankles, and knees.     Do you have any known blood/bleeding disorders?  No  Does patient currently take blood thinners?   None  Does patient currently take any antibiotics?   No      Patient is currently on pain medications:  No  Reason pain medications are prescribed: N/A  Pain medications are prescribed by: N/A  [place] Prescription Monitoring review: N/A  DIRE: N/A  Treatment decision: N/A    MEDICAL DECISION MAKING:     Impression: Lumbar radiculopathy    Acute on chronic flare of pain, now with severe left lower extremity pain with numbness and tingling along the lower leg and foot to the toes in the setting of MRI evidence from 2020 of mild degenerative changes resulting in mild to moderate central and foraminal narrowing at L4-5 and L5-S1.  Had found no improvement with facet injections, and had reported no improvement with LESI's.  For time, was doing reasonably well with over-the-counter meds, though over the past 2 weeks, pain has been debilitating.  Has found no improvement with p.o. steroid, Norco, muscle relaxers, or NSAIDs.  Sent for further options after visit to the ER.    On exam, pain with range of motion lumbar spine with both flexion and extension.  Positive left straight leg raise, and positive contralateral right straight leg raise.  Weakness of bilateral dorsiflexion and EHL (4 -/5).  No focal sensory deficits.    We did discuss options, and will check x-ray with flexion-extension views.  Given the severity of his pain, I do not think that he would be able to complete physical therapy, though did offer PT.  He states that he can barely sit let alone engage in therapy, and as such, we will have him go for MRI of the lumbar  spine.  Recommendations to follow based on findings.    Plan: Order in for physical therapy if he can tolerate it.  X-ray lumbar spine with flex ex views.  MRI lumbar spine.  Follow-up after completion of imaging for discussion of plan of care (this can be a virtual visit).    The patient indicates understanding of these issues and agrees to the plan.      Thank you very much.     Respectfully yours,    THOMAS Arreaga

## 2024-09-11 NOTE — TELEPHONE ENCOUNTER
Attempted to contact patient and voice mailbox is full.    According to after visit summary, patient is to follow up with Neurosurgery or pain.  Patient last seen in office on 11/14/2024.  MRI Cervical Spine and MRI Lumbar spine not completed  Follow up for treatment plan was recommended after review of MRI.

## 2024-09-11 NOTE — TELEPHONE ENCOUNTER
Spoke to patient who states he is in extreme pain and needs to see Dr Dye.  Advised that his AVS recommends follow up with neurosurgery or pain clinic.  Patient states he did that and they want to do xrays and he is not going to do that because he knows what's wrong and so does Dr Dye.  Advised that Dr Dye is unable to see him right away due to scheduling and time off and advised patient he needs to follow up as directed by whomever he spoke with at Neurosurgery or Pain. Advised that his issue is acute and needs to trust what the professionals are telling him.    Discussed MRI's that were not completed. Patient states it is due to insurance non-coverage.

## 2024-09-12 ENCOUNTER — HOSPITAL ENCOUNTER (EMERGENCY)
Age: 29
Discharge: LEFT BEFORE DISCHARGE | End: 2024-09-13

## 2024-09-12 VITALS
OXYGEN SATURATION: 97 % | SYSTOLIC BLOOD PRESSURE: 151 MMHG | DIASTOLIC BLOOD PRESSURE: 99 MMHG | TEMPERATURE: 98.9 F | HEART RATE: 120 BPM | RESPIRATION RATE: 20 BRPM

## 2024-09-16 ENCOUNTER — TELEPHONE (OUTPATIENT)
Dept: CASE MANAGEMENT | Age: 29
End: 2024-09-16

## 2024-09-16 ENCOUNTER — PATIENT MESSAGE (OUTPATIENT)
Dept: CASE MANAGEMENT | Age: 29
End: 2024-09-16

## 2024-09-16 NOTE — TELEPHONE ENCOUNTER
Reference number [id number] .  Contacted [facility] and scheduled a peer to peer on 9/17/2024 at 2 pm with

## 2024-09-16 NOTE — TELEPHONE ENCOUNTER
MRI Lumbar Spine         Status: DENIED        Reference number [id number]      A copy of the denial letter is filed under the MEDIA tab, reference for complete details. You may reach out to Naz at [phone]  to discuss decision by 09/20/2024     Please reach out to patient with plan of care.      Thank you    The clinical rationale provided by payer is based on past medical history, current signs and symptoms including the examination performed, previous imaging reports (including X-Ray, US, CT, etc.), previous care given - including conservative therapies (e.g. medication, therapies), any previous intervention reports (e.g. surgery, injections, etc.), and any lab / pathology results related to this Imaging request         Case remains [id number] eligible until end of day 09/20/2024 . Be advised: An appeal will need to be done by your office should you not do the peer to peer.    Case remains first level appeal eligible for the next 60 calendar days, appeal information may be found within denial letter.   [id number] offer letter and denial letter PDF's may be found within the Media tab. Please un-check \"Clinical Info Only\" to view.

## 2024-09-17 ENCOUNTER — APPOINTMENT (OUTPATIENT)
Dept: CT IMAGING | Facility: HOSPITAL | Age: 29
End: 2024-09-17
Attending: EMERGENCY MEDICINE
Payer: MEDICAID

## 2024-09-17 ENCOUNTER — HOSPITAL ENCOUNTER (EMERGENCY)
Facility: HOSPITAL | Age: 29
Discharge: HOME | End: 2024-09-17
Attending: EMERGENCY MEDICINE
Payer: MEDICAID

## 2024-09-17 VITALS
BODY MASS INDEX: 35.12 KG/M2 | WEIGHT: 265 LBS | OXYGEN SATURATION: 96 % | SYSTOLIC BLOOD PRESSURE: 152 MMHG | HEART RATE: 76 BPM | TEMPERATURE: 98 F | DIASTOLIC BLOOD PRESSURE: 88 MMHG | HEIGHT: 73 IN | RESPIRATION RATE: 20 BRPM

## 2024-09-17 DIAGNOSIS — M25.562 ACUTE PAIN OF LEFT KNEE: Primary | ICD-10-CM

## 2024-09-17 DIAGNOSIS — M54.16 LUMBAR RADICULOPATHY: ICD-10-CM

## 2024-09-17 PROCEDURE — 96374 THER/PROPH/DIAG INJ IV PUSH: CPT

## 2024-09-17 PROCEDURE — 99284 EMERGENCY DEPT VISIT MOD MDM: CPT

## 2024-09-17 PROCEDURE — 72131 CT LUMBAR SPINE W/O DYE: CPT | Performed by: EMERGENCY MEDICINE

## 2024-09-17 PROCEDURE — 73700 CT LOWER EXTREMITY W/O DYE: CPT | Performed by: EMERGENCY MEDICINE

## 2024-09-17 PROCEDURE — 96375 TX/PRO/DX INJ NEW DRUG ADDON: CPT

## 2024-09-17 RX ORDER — METHOCARBAMOL 100 MG/ML
1000 INJECTION, SOLUTION INTRAMUSCULAR; INTRAVENOUS ONCE
Status: COMPLETED | OUTPATIENT
Start: 2024-09-17 | End: 2024-09-17

## 2024-09-17 RX ORDER — KETOROLAC TROMETHAMINE 10 MG/1
10 TABLET, FILM COATED ORAL EVERY 6 HOURS PRN
Qty: 20 TABLET | Refills: 0 | Status: SHIPPED | OUTPATIENT
Start: 2024-09-17 | End: 2024-09-22

## 2024-09-17 RX ORDER — PREDNISONE 20 MG/1
60 TABLET ORAL DAILY
Qty: 15 TABLET | Refills: 0 | Status: SHIPPED | OUTPATIENT
Start: 2024-09-17 | End: 2024-09-22

## 2024-09-17 RX ORDER — MORPHINE SULFATE 2 MG/ML
2 INJECTION, SOLUTION INTRAMUSCULAR; INTRAVENOUS ONCE
Status: COMPLETED | OUTPATIENT
Start: 2024-09-17 | End: 2024-09-17

## 2024-09-17 RX ORDER — DEXAMETHASONE SODIUM PHOSPHATE 10 MG/ML
10 INJECTION, SOLUTION INTRAMUSCULAR; INTRAVENOUS ONCE
Status: COMPLETED | OUTPATIENT
Start: 2024-09-17 | End: 2024-09-17

## 2024-09-17 RX ORDER — METHOCARBAMOL 500 MG/1
500 TABLET, FILM COATED ORAL 4 TIMES DAILY PRN
Qty: 20 TABLET | Refills: 0 | Status: SHIPPED | OUTPATIENT
Start: 2024-09-17 | End: 2024-09-22

## 2024-09-17 RX ORDER — HYDROCODONE BITARTRATE AND ACETAMINOPHEN 5; 325 MG/1; MG/1
1 TABLET ORAL EVERY 8 HOURS PRN
Qty: 9 TABLET | Refills: 0 | Status: SHIPPED | OUTPATIENT
Start: 2024-09-17 | End: 2024-09-20

## 2024-09-17 RX ORDER — KETOROLAC TROMETHAMINE 15 MG/ML
15 INJECTION, SOLUTION INTRAMUSCULAR; INTRAVENOUS ONCE
Status: COMPLETED | OUTPATIENT
Start: 2024-09-17 | End: 2024-09-17

## 2024-09-17 NOTE — ED INITIAL ASSESSMENT (HPI)
Pt presents to the ER with c/o left leg pain after hearing a pop a week ago while playing basketball on 9/11.     Pt was seen at [facility] pt states no imaging was done and was just prescribed Norco and steroids.     Per chart PCP will not order MRI with out pt going to PT/OT first Also was recommended f/u with neurosurgeon or pain clinic.    Pt states he had no imaging done at

## 2024-09-17 NOTE — ED PROVIDER NOTES
Patient Seen in: [facility] Emergency Department    History     Chief Complaint   Patient presents with    Leg or Foot Injury     Stated Complaint: Leg Pain     HPI    29-year-old male with past medical history of lumbar DJD presenting with brother for evaluation with ongoing right anterior thigh in addition to left paralumbar pain with radiation into buttocks since basketball injury sustained several weeks ago.  Patient was abruptly changing directions/cutting across court when he noted acute knee/lumbar/buttock pain with persisting symptoms despite hydrocodone/Medrol Dosepak.  No focal weakness though with tingling to left lower extremity.  No anticoagulant or antiplatelet use.  No incontinence or saddle anesthesia.    Past Medical History:    Anxiety    Back pain    Back problem    Depression    History of stomach ulcers       Past Surgical History:   Procedure Laterality Date    Adenoidectomy      Appendectomy  03/2023    Create eardrum opening,gen anesth      Other      ear tubes    Removal adenoids,primary,12+ y/o      Removal of tonsils,12+ y/o      and ademoids    Tonsillectomy              Family History   Problem Relation Age of Onset    Arthritis Mother     Diabetes Paternal Grandmother     Heart Disorder Maternal Grandmother     Heart Disorder Maternal Grandfather     Diabetes Maternal Grandfather        Social History     Socioeconomic History    Marital status: Single   Tobacco Use    Smoking status: Never     Passive exposure: Yes    Smokeless tobacco: Never   Vaping Use    Vaping status: Never Used   Substance and Sexual Activity    Alcohol use: Yes     Comment: weekends    Drug use: Yes     Frequency: 70.0 times per week     Types: Cannabis     Comment: 10 times per day   Other Topics Concern    Caffeine Concern No    Exercise No     Social Determinants of Health     Financial Resource Strain: Low Risk  (1/23/2023)    Received from [facility], [facility]    Financial  Resource Strain     In the past year, have you or any family members you live with been unable to get any of the following when it was really needed? Check all that apply.: None   Food Insecurity: No Food Insecurity (1/23/2023)    Received from [facility], [facility]    Food Insecurity     RETIRE How often in the past 12 months would you say you are worried or stressed about having enough money to buy nutritious meals? : Never   Transportation Needs: No Transportation Needs (1/23/2023)    Received from [facility], [facility], [facility]    PRAPARE - Transportation     In the past 12 months, has lack of transportation kept you from medical appointments or from getting medications?: No     In the past 12 months, has lack of transportation kept you from meetings, work, or from getting things needed for daily living?: No   Social Connections: Socially Integrated (1/24/2023)    [facility] from [facility], [facility]    Social Connections     How often do you see or talk to people that you care about and feel close to? (For example: talking to friends on the phone, visiting friends or family, going to [employer] or club meetings): 5 or more times a week       Review of Systems :  Constitutional: As per HPI  Musculoskeletal: Negative for joint swelling and arthralgias. (+) lumbar/leg pain.  Skin: Negative for rash. No itching.      Positive for stated complaint: Leg Pain  Other systems are as noted in HPI.  Constitutional and vital signs reviewed.      All other systems reviewed and negative except as noted above.    PSFH elements reviewed from today and agreed except as otherwise stated in HPI.    Physical Exam     ED Triage Vitals [09/17/24 1457]   /83   Pulse 87   Resp 18   Temp 98.4 °F (36.9 °C)   Temp src Oral   SpO2 97 %   O2 Device None (Room air)       Current:/83   Pulse 87   Temp 98.4 °F (36.9 °C) (Oral)   Resp 18   Ht  185.4 cm (6' 1\")   Wt 120.2 kg   SpO2 97%   BMI 34.96 kg/m²         Physical Exam   Constitutional: No distress.   HEENT: MMM.  Head: Normocephalic.   Eyes: No injection.   Cardiovascular: RRR. BLE with 2+ DP/PT pulses.  Pulmonary/Chest: Effort normal. CTAB.  Abdominal: Soft. Nontender.  Musculoskeletal: No gross deformity. No midline thoracolumbar stepoff/deformity; left paralumbar spasm without cutaneous/crepitant change; left knee with intact and mildly painful range of motion without cutaneous or crepitant change and with soft compartments.  Neurological: Alert.  Lower extremity with 5/5 strength proximally and distally with left knee range of motion limited by pain though grossly intact.  Skin: Skin is warm.   Psychiatric: Cooperative.  Nursing note and vitals reviewed.        ED Course   Labs Reviewed - No data to display  CT KNEE LEFT (NHQ=94916)    Result Date: 9/17/2024  PROCEDURE: CT KNEE LEFT (CPT=73700)  COMPARISON: None.  INDICATIONS: Left leg injured two weeks ago, left leg pain.  TECHNIQUE: Multi-planar CT images of the left knee were obtained without intravenous contrast material.  Automated exposure control for dose reduction was used. Adjustment of the mA and/or kV was done based on the patient's size. Use of iterative reconstruction technique for dose reduction was used.  Dose information is transmitted to the ACR (American College of Radiology) NRDR (National Radiology Data Registry) which includes the Dose Index Registry.  Findings and impression:  Normal alignment.  No fracture.  No effusion.  6 x 4 millimeter well corticated calcification in the infrapopliteal tendon near the tibial tubercle may be the result of chronic Osgood Brito Finalized by (CST): Igor Hunt MD on 9/17/2024 at 4:45 PM          CT SPINE LUMBAR (CPT=72131)    Result Date: 9/17/2024  PROCEDURE: CT SPINE LUMBAR (CPT=72131)  COMPARISON: None.  INDICATIONS: lower back pain , injured 2 weeks ago while playing basket  ball per patient.  TECHNIQUE:   Multi-planar CT images were obtained without intravenous contrast material.  Automated exposure control for dose reduction was used. Adjustment of the mA and/or kV was done based on the patient's size. Use of iterative reconstruction technique for dose reduction was used.  Dose information is transmitted to the ACR (American College of Radiology) NRDR (National Radiology Data Registry) which includes the Dose Index Registry.   FINDINGS:  PARASPINAL AREA: No mass.  No hematoma. BONES:   The vertebral body heights are maintained.  No acute fracture.  No aggressive osseous lesion.  There are few small Schmorl's nodes involving the inferior endplates of L2, L3, and L4 (9:49, 50). ALIGNMENT:   The lumbar lordosis is intact.  No significant listhesis.  The facet joints are well aligned.  LUMBAR DISC LEVELS:  There is a disc bulge with superimposed central disc protrusion at L4-L5 (3:52, 2:95).  There is moderate canal stenosis at this level.  There is a disc bulge with superimposed central/right paracentral disc protrusion at L5-S1 (2:107, 6:53).  Mild canal  stenosis and mild right subarticular zone stenosis at this level.  There are small disc bulges at L2-L3 and L3-L4 without significant canal stenosis.  OTHER: No hydronephrosis.  The visualized loops of bowel are not dilated.  There is a retroaortic left renal vein.         CONCLUSION:   1. No acute fracture or traumatic listhesis of the lumbar spine. 2. At L4-L5, there is a disc bulge with superimposed central disc protrusion that results in moderate canal stenosis.  3. Additional disc bulge with superimposed central/right paracentral disc protrusion at L5-S1 that results in mild canal stenosis and mild right subarticular zone stenosis.  The degenerative changes can be further evaluated with a nonemergent MRI of the lumbar spine, as clinically indicated. 4. Lesser incidental findings described above.     Dictated by (CST): Luis E  MD Garry on 9/17/2024 at 4:28 PM     Finalized by (CST): Garry Kimbrough MD on 9/17/2024 at 4:35 PM           ED Course as of 09/17/24 1723  ------------------------------------------------------------  Time: 09/17 1718  Comment: Imaging nonacute, case d/w CM Stacie to facilitate ongoing outpatient including physiatry followup.       MDM   DIFFERENTIAL DIAGNOSIS: After history and physical exam differential diagnosis includes but is not limited to meniscal/ligamentous knee injury, lumbar radiculopathy, lytic lesion/compression fracture.    Pulse ox: 97%:Normal on RA, as independently interpreted by myself    Medical Decision Making  Evaluation for ongoing left knee and left lumbar radiculopathy without red flag symptoms or neurovascular compromise and without indication for advanced/emergent neuroimaging.  Imaging as noted, will discharge with symptomatic care including steroid burst with NSAIDs and physiatry referral for follow-up for which case management contacted to facilitate same.    Problems Addressed:  Acute pain of left knee: acute illness or injury  Lumbar radiculopathy: acute illness or injury    Amount and/or Complexity of Data Reviewed  External Data Reviewed: notes.     Details: 9/12 and 9/6 OSH notes reviewed  Radiology: ordered and independent interpretation performed. Decision-making details documented in ED Course.     Details: CT without obvious lytic lesion as independently interpreted by myself  Discussion of management or test interpretation with external provider(s): Case graciously d/w CM Stacie to facilitate physiatry followup    Risk  Prescription drug management.  Parenteral controlled substances.      I was wearing at minimum a facemask and eye protection throughout this encounter with handwashing performed prior and after patient evaluation without personal hand/facial/oropharyngeal contact and gloves worn throughout encounter. See note and/or contact this provider for further PPE  details.      Disposition and Plan     Clinical Impression:  1. Acute pain of left knee    2. Lumbar radiculopathy        Disposition:  Discharge    Follow-up:  Leo Zhou MD  [address]  [address]  [place] [id number] [phone]    Call  For ongoing outpatient followup and re-evaluation.    Kyaw Schneider MD  [address]  [address]  [facility] [id number] [phone]    Call  For physiatry follow-up and re-evaluation.      Medications Prescribed:  Discharge Medication List as of 9/17/2024  5:28 PM        START taking these medications    Details   methocarbamol 500 MG Oral Tab Take 1 tablet (500 mg total) by mouth 4 (four) times daily as needed (For spasm/pain. Use caution while taking this medication - it may make you drowsy/dizzy.)., Normal, Disp-20 tablet, R-0      Ketorolac Tromethamine 10 MG Oral Tab Take 1 tablet (10 mg total) by mouth every 6 (six) hours as needed for Pain., Normal, Disp-20 tablet, R-0      predniSONE 20 MG Oral Tab Take 3 tablets (60 mg total) by mouth daily for 5 days., Normal, Disp-15 tablet, R-0

## 2024-09-17 NOTE — ED QUICK NOTES
.Patient cleared for discharge by ED MD. Patient verbalizes understanding of discharge instructions and prescriptions. Pt exited ER via wheelchair.

## 2024-09-17 NOTE — CM/SW NOTE
Per ERMD, ERCM to schedule ER f/u appt with Dr. Kyaw Schneider, physiatrist at [id number].    EDCM to call and schedule appt and call patient with scheduled time and date.

## 2024-09-18 ENCOUNTER — PATIENT MESSAGE (OUTPATIENT)
Dept: PHYSICAL MEDICINE AND REHAB | Facility: CLINIC | Age: 29
End: 2024-09-18

## 2024-09-18 ENCOUNTER — TELEPHONE (OUTPATIENT)
Dept: PHYSICAL MEDICINE AND REHAB | Facility: CLINIC | Age: 29
End: 2024-09-18

## 2024-09-18 NOTE — TELEPHONE ENCOUNTER
Spoke with ER  and set up follow up appointment for patient with .     Nothing further needed at this time.

## 2024-09-18 NOTE — CM/SW NOTE
Appt made for Tues 9/24 at 11 am at [address] in [address] with Dr. Schneider.     Attemped to call patient and his voicemail is full. It also went straight to voicemail.     I will call later today     0945- called again phone is still off and vm is full.     1215- phone remains off and vm is full. Will attempt again later today.     1440- Called patient and he answered. He saw his appt information on [facility].

## 2024-09-20 ENCOUNTER — HOSPITAL ENCOUNTER (OUTPATIENT)
Dept: MRI IMAGING | Facility: HOSPITAL | Age: 29
Discharge: HOME | End: 2024-09-20
Attending: PHYSICIAN ASSISTANT
Payer: MEDICAID

## 2024-09-20 DIAGNOSIS — M54.16 LUMBAR RADICULOPATHY: ICD-10-CM

## 2024-09-20 PROCEDURE — 72148 MRI LUMBAR SPINE W/O DYE: CPT | Performed by: PHYSICIAN ASSISTANT

## 2024-09-24 ENCOUNTER — OFFICE VISIT (OUTPATIENT)
Dept: PHYSICAL MEDICINE AND REHAB | Facility: CLINIC | Age: 29
End: 2024-09-24
Payer: MEDICAID

## 2024-09-24 ENCOUNTER — LAB ENCOUNTER (OUTPATIENT)
Dept: LAB | Facility: HOSPITAL | Age: 29
End: 2024-09-24
Attending: PHYSICAL MEDICINE & REHABILITATION
Payer: MEDICAID

## 2024-09-24 ENCOUNTER — TELEPHONE (OUTPATIENT)
Dept: PHYSICAL MEDICINE AND REHAB | Facility: CLINIC | Age: 29
End: 2024-09-24

## 2024-09-24 ENCOUNTER — PATIENT MESSAGE (OUTPATIENT)
Dept: PHYSICAL MEDICINE AND REHAB | Facility: CLINIC | Age: 29
End: 2024-09-24

## 2024-09-24 VITALS — OXYGEN SATURATION: 95 % | BODY MASS INDEX: 22 KG/M2 | WEIGHT: 165 LBS | HEART RATE: 105 BPM

## 2024-09-24 DIAGNOSIS — Z51.81 THERAPEUTIC DRUG MONITORING: ICD-10-CM

## 2024-09-24 DIAGNOSIS — M51.26 LUMBAR DISC HERNIATION: Primary | ICD-10-CM

## 2024-09-24 PROBLEM — K29.00 ACUTE GASTRITIS WITHOUT HEMORRHAGE: Status: ACTIVE | Noted: 2024-01-23

## 2024-09-24 PROBLEM — K27.9 PUD (PEPTIC ULCER DISEASE): Status: ACTIVE | Noted: 2024-01-23

## 2024-09-24 PROBLEM — F41.9 ANXIETY: Status: ACTIVE | Noted: 2024-01-23

## 2024-09-24 LAB
AMPHET UR QL SCN: NEGATIVE
BARBITURATES UR QL SCN: NEGATIVE
COCAINE UR QL: NEGATIVE
CREAT UR-SCNC: 68.9 MG/DL
FENTANYL UR QL SCN: NEGATIVE
MDMA UR QL SCN: NEGATIVE
METHADONE UR QL SCN: NEGATIVE
OXYCODONE UR QL SCN: NEGATIVE
PCP UR QL SCN: NEGATIVE

## 2024-09-24 PROCEDURE — 99204 OFFICE O/P NEW MOD 45 MIN: CPT | Performed by: PHYSICAL MEDICINE & REHABILITATION

## 2024-09-24 PROCEDURE — 80349 CANNABINOIDS NATURAL: CPT

## 2024-09-24 PROCEDURE — 80346 BENZODIAZEPINES1-12: CPT

## 2024-09-24 PROCEDURE — 80307 DRUG TEST PRSMV CHEM ANLYZR: CPT

## 2024-09-24 PROCEDURE — 80361 OPIATES 1 OR MORE: CPT

## 2024-09-24 PROCEDURE — 80354 DRUG SCREENING FENTANYL: CPT

## 2024-09-24 NOTE — H&P (VIEW-ONLY)
[facility]  Progress Note    CHIEF COMPLAINT:    Chief Complaint   Patient presents with    New Patient     New R handed patient is here for L knee and L lateral thigh pain. He has hah low back pain for about 4 years. He went to the ED after he fell while playing basketball. Imaging on file. PT a few years ago with relief and states he does some HEP. He has an LEIGH years ago with relief for about a year. Low back pain radiates to L lateral thigh and L knee. T/n in toes in L foot. Tylenol daily. He completed the norco he was given at the ED. He has been off work for 2 weeks. Pain 10/10.         History of Present Illness:  Samuel Hill is a 29 year old male who is being seen in consultation at the request of Dr. Rob Velarde.  He is here today with a chief complaint of left leg pain.  He has had on and off symptoms for 4 years.  He tells me he responded to an epidural injection in years past.  Recently he has had an acute exacerbation which has caused him to visit numerous emergency departments since at least earlier in this month.  He also was seen at the [place] emergency department and was sent to see the pain clinic.      He saw Garry Reynoso on 9/11/24: \"Patient is here today at the request of the emergency department with pain in above-described distribution that has been ongoing atraumatically for years.  He had been seen here in 2020, at which time he had reported onset of pain as far back as his late teenage years.  He had undergone bilateral facet injections, no change in pain.  Underwent LESI on 11/11/2020, from which, states that he had no change in pain.  For the past 4 years, simply lived with it, and used OTC meds.       Over the past 2 weeks, pain has significantly flared.  He was seen by [facility], though left AMA.  He went to another ER that day, [facility], and was discharged here for further options.  He was given MDP, flexeril, and norco,  to no change in pain.\"    He initially says he has knee pain.  He has been given a knee brace apparently.  At the ER he was worked up with a CT lumbar spine and CT knee.  The lumbar spine showed degenerative change in the knee CT was normal.  He has numbness and tingling, antalgic gait, lots of pain.    PAST MEDICAL HISTORY:  Past Medical History:    Anxiety    Back pain    Back problem    Depression    History of stomach ulcers       SURGICAL HISTORY:  Past Surgical History:   Procedure Laterality Date    Adenoidectomy      Appendectomy  03/2023    Create eardrum opening,gen anesth      Other      ear tubes    Removal adenoids,primary,12+ y/o      Removal of tonsils,12+ y/o      and ademoids    Tonsillectomy         SOCIAL HISTORY:   Social History     Occupational History    Not on file   Tobacco Use    Smoking status: Never     Passive exposure: Yes    Smokeless tobacco: Never   Vaping Use    Vaping status: Never Used   Substance and Sexual Activity    Alcohol use: Yes     Comment: weekends    Drug use: Yes     Frequency: 70.0 times per week     Types: Cannabis     Comment: 10 times per day    Sexual activity: Not on file       CURRENT MEDICATIONS:   Current Outpatient Medications   Medication Sig Dispense Refill    methylPREDNISolone (MEDROL) 4 MG Oral Tablet Therapy Pack Dosepack: take as directed 1 each 0    alprazolam 2 MG Oral Tab Take 1 tablet (2 mg total) by mouth As Directed.         ALLERGIES:   No Known Allergies    REVIEW OF SYSTEMS:   Patient-reported ROS  Constitutional  Sleep Disturbance: admits  Chills: denies  Fever: denies  Weight Gain: denies  Weight Loss: denies   Cardiovascular  Chest Pain: denies  Irregular Heartbeat: denies   Respiratory  Painful Breathing: denies  Wheezing: denies   Gastrointestinal  Bowel Incontinence: denies  Heartburn: denies  Abdominal Pain: denies  Blood in Stool : denies  Rectal Pain: denies   Hematology  Easy Bruising: denies  Easy Bleeding: denies    Genitourinary  Difficulty Urinating: denies  Bladder Incontinence: denies  Pelvic Pain: denies  Painful Urination: denies   Musculoskeletal  Joint Stiffness: admits  Painful Joints: admits  Tailbone Pain: denies  Swollen Joints: denies   Peripheral Vascular  Swelling of Legs/Feet: admits  Cold Extremities: denies   Skin  Open Sores: denies  Nodules or Lumps: denies  Rash: denies   Neurological  Loss of Strength Since last Visit: admits  Tingling/Numbness: admits  Balance: denies   Psychiatric  Anxiety: denies  Depressed Mood: denies             PHYSICAL EXAM:   Pulse 105   Wt 165 lb (74.8 kg)   SpO2 95%   BMI 21.77 kg/m²     Body mass index is 21.77 kg/m².      General: No immediate distress, smells like marijuana  Head: Normocephalic/ Atraumatic  Extremities: No lower extremity edema bilaterally. Peripheral pulses intact.   Spine: limited lumbar ROM in all directions  Hips: full and painfree ROM   Neuro:   Cognition: alert & oriented x 3, attentive, able to follow 2 step commands, comprehention intact, spontaneous speech intact  Strength: Lower extremities have 5/5 strength toe movement lesson right breakaway ADF  Sensation: Normal lower extremities decr L5  Reflexes: Normal lower extremities  SLR: pos bilat    Data    Radiology Imaging:  I personally reviewed the lumbar MRI from September 20, 2024 showing a large L45 disc extrusion with central and bilateral components.    ASSESSMENT AND PLAN:  1. Lumbar disc herniation  According to the patient he has responded to an epidural in the past.  According to the chart he has not.  He has already seen the pain clinic at [facility].  I asked him to make a choice between either seeing the pain clinic there or to continue with treatment at [facility] with me.  He chose to see me because he states it is closer to his home.  Given the large disc herniation, I recommend an epidural.  If no better with that I recommend a surgical consult and management.  Regarding pain  management, this is a complex situation given high levels of regular marijuana use, past history of drug overdose and frequent emergency room visits for pain medication.  Will obtain a urine drug screen.  If taking nonprescription medication other than marijuana or nonprescribed narcotics I will not be able to provide him with prescribed narcotics as a safety measure.  - SPECIALTY (OTHER) - EXTERNAL  - PHYSICAL THERAPY - INTERNAL    2. Therapeutic drug monitoring  H/O drug overdose.  Uses marijuana 10 times per day.  Will need to do urine screen before prescribing requested narcotics.  - Drug Abuse Panel 11 w/confirm; Future  - Fentanyl/Norfentanyl Confirmation, Urine; Future        RTC: For epidural injection      The patient was in agreement with the assessment and plan.  All questions were answered.        Kyaw Schneider MD  Physical Medicine and Rehabilitation/Sports Medicine  [facility]

## 2024-09-24 NOTE — PROGRESS NOTES
[facility]  Progress Note    CHIEF COMPLAINT:    Chief Complaint   Patient presents with    New Patient     New R handed patient is here for L knee and L lateral thigh pain. He has hah low back pain for about 4 years. He went to the ED after he fell while playing basketball. Imaging on file. PT a few years ago with relief and states he does some HEP. He has an LEIGH years ago with relief for about a year. Low back pain radiates to L lateral thigh and L knee. T/n in toes in L foot. Tylenol daily. He completed the norco he was given at the ED. He has been off work for 2 weeks. Pain 10/10.         History of Present Illness:  Samuel Hill is a 29 year old male who is being seen in consultation at the request of Dr. Rob Velarde.  He is here today with a chief complaint of left leg pain.  He has had on and off symptoms for 4 years.  He tells me he responded to an epidural injection in years past.  Recently he has had an acute exacerbation which has caused him to visit numerous emergency departments since at least earlier in this month.  He also was seen at the [place] emergency department and was sent to see the pain clinic.      He saw Garry Reynoso on 9/11/24: \"Patient is here today at the request of the emergency department with pain in above-described distribution that has been ongoing atraumatically for years.  He had been seen here in 2020, at which time he had reported onset of pain as far back as his late teenage years.  He had undergone bilateral facet injections, no change in pain.  Underwent LESI on 11/11/2020, from which, states that he had no change in pain.  For the past 4 years, simply lived with it, and used OTC meds.       Over the past 2 weeks, pain has significantly flared.  He was seen by [facility], though left AMA.  He went to another ER that day, [facility], and was discharged here for further options.  He was given MDP, flexeril, and norco,  to no change in pain.\"    He initially says he has knee pain.  He has been given a knee brace apparently.  At the ER he was worked up with a CT lumbar spine and CT knee.  The lumbar spine showed degenerative change in the knee CT was normal.  He has numbness and tingling, antalgic gait, lots of pain.    PAST MEDICAL HISTORY:  Past Medical History:    Anxiety    Back pain    Back problem    Depression    History of stomach ulcers       SURGICAL HISTORY:  Past Surgical History:   Procedure Laterality Date    Adenoidectomy      Appendectomy  03/2023    Create eardrum opening,gen anesth      Other      ear tubes    Removal adenoids,primary,12+ y/o      Removal of tonsils,12+ y/o      and ademoids    Tonsillectomy         SOCIAL HISTORY:   Social History     Occupational History    Not on file   Tobacco Use    Smoking status: Never     Passive exposure: Yes    Smokeless tobacco: Never   Vaping Use    Vaping status: Never Used   Substance and Sexual Activity    Alcohol use: Yes     Comment: weekends    Drug use: Yes     Frequency: 70.0 times per week     Types: Cannabis     Comment: 10 times per day    Sexual activity: Not on file       CURRENT MEDICATIONS:   Current Outpatient Medications   Medication Sig Dispense Refill    methylPREDNISolone (MEDROL) 4 MG Oral Tablet Therapy Pack Dosepack: take as directed 1 each 0    alprazolam 2 MG Oral Tab Take 1 tablet (2 mg total) by mouth As Directed.         ALLERGIES:   No Known Allergies    REVIEW OF SYSTEMS:   Patient-reported ROS  Constitutional  Sleep Disturbance: admits  Chills: denies  Fever: denies  Weight Gain: denies  Weight Loss: denies   Cardiovascular  Chest Pain: denies  Irregular Heartbeat: denies   Respiratory  Painful Breathing: denies  Wheezing: denies   Gastrointestinal  Bowel Incontinence: denies  Heartburn: denies  Abdominal Pain: denies  Blood in Stool : denies  Rectal Pain: denies   Hematology  Easy Bruising: denies  Easy Bleeding: denies    Genitourinary  Difficulty Urinating: denies  Bladder Incontinence: denies  Pelvic Pain: denies  Painful Urination: denies   Musculoskeletal  Joint Stiffness: admits  Painful Joints: admits  Tailbone Pain: denies  Swollen Joints: denies   Peripheral Vascular  Swelling of Legs/Feet: admits  Cold Extremities: denies   Skin  Open Sores: denies  Nodules or Lumps: denies  Rash: denies   Neurological  Loss of Strength Since last Visit: admits  Tingling/Numbness: admits  Balance: denies   Psychiatric  Anxiety: denies  Depressed Mood: denies             PHYSICAL EXAM:   Pulse 105   Wt 165 lb (74.8 kg)   SpO2 95%   BMI 21.77 kg/m²     Body mass index is 21.77 kg/m².      General: No immediate distress, smells like marijuana  Head: Normocephalic/ Atraumatic  Extremities: No lower extremity edema bilaterally. Peripheral pulses intact.   Spine: limited lumbar ROM in all directions  Hips: full and painfree ROM   Neuro:   Cognition: alert & oriented x 3, attentive, able to follow 2 step commands, comprehention intact, spontaneous speech intact  Strength: Lower extremities have 5/5 strength toe movement lesson right breakaway ADF  Sensation: Normal lower extremities decr L5  Reflexes: Normal lower extremities  SLR: pos bilat    Data    Radiology Imaging:  I personally reviewed the lumbar MRI from September 20, 2024 showing a large L45 disc extrusion with central and bilateral components.    ASSESSMENT AND PLAN:  1. Lumbar disc herniation  According to the patient he has responded to an epidural in the past.  According to the chart he has not.  He has already seen the pain clinic at [facility].  I asked him to make a choice between either seeing the pain clinic there or to continue with treatment at [place] with me.  He chose to see me because he states it is closer to his home.  Given the large disc herniation, I recommend an epidural.  If no better with that I recommend a surgical consult and management.  Regarding pain  management, this is a complex situation given high levels of regular marijuana use, past history of drug overdose and frequent emergency room visits for pain medication.  Will obtain a urine drug screen.  If taking nonprescription medication other than marijuana or nonprescribed narcotics I will not be able to provide him with prescribed narcotics as a safety measure.  - SPECIALTY (OTHER) - EXTERNAL  - PHYSICAL THERAPY - INTERNAL    2. Therapeutic drug monitoring  H/O drug overdose.  Uses marijuana 10 times per day.  Will need to do urine screen before prescribing requested narcotics.  - Drug Abuse Panel 11 w/confirm; Future  - Fentanyl/Norfentanyl Confirmation, Urine; Future        RTC: For epidural injection      The patient was in agreement with the assessment and plan.  All questions were answered.        Kyaw Schneider MD  Physical Medicine and Rehabilitation/Sports Medicine  [facility]

## 2024-09-24 NOTE — TELEPHONE ENCOUNTER
Initiated authorization for Caudal epidural steroid injection with conscious sedation. CPT/HCPCS 51373 dx:M51.26 to be done at [facility] with [employer]    Status: Approved  Reference/Authorization # [id number]  Valid: 9/24/24-11/23/24

## 2024-09-25 ENCOUNTER — TELEPHONE (OUTPATIENT)
Dept: PHYSICAL MEDICINE AND REHAB | Facility: CLINIC | Age: 29
End: 2024-09-25

## 2024-09-25 DIAGNOSIS — M51.26 LUMBAR DISC HERNIATION: Primary | ICD-10-CM

## 2024-09-25 RX ORDER — GABAPENTIN 300 MG/1
CAPSULE ORAL
Qty: 90 CAPSULE | Refills: 0 | Status: SHIPPED | OUTPATIENT
Start: 2024-09-25

## 2024-09-25 RX ORDER — CYCLOBENZAPRINE HCL 10 MG
10 TABLET ORAL NIGHTLY PRN
Qty: 30 TABLET | Refills: 0 | Status: SHIPPED | OUTPATIENT
Start: 2024-09-25

## 2024-09-25 RX ORDER — HYDROCODONE BITARTRATE AND ACETAMINOPHEN 5; 325 MG/1; MG/1
1 TABLET ORAL EVERY 8 HOURS PRN
Qty: 30 TABLET | Refills: 0 | Status: SHIPPED | OUTPATIENT
Start: 2024-09-25

## 2024-09-25 NOTE — TELEPHONE ENCOUNTER
Patient has been scheduled for caudal steroid epidural injection on 10/4/24 at ENDO with Dr. Schneider.   Anesthesia type:  IVCS   Arrival Time: 2pm & Procedure Time: 3pm   Patient was advised that if he/she does receive the covid vaccine it needs to be at least 2 weeks before or after the injection.  Medications and allergies reviewed.  Educated to hold NSAIDS (Aleve, Ibuprofen, Motrin, Advil) and anti-inflammatories (Meloxicam, Naproxen, Diclofenac, Celebrex) and for cervical injections must hold Multi-Vitamins, Vitamin E, Fish Oil/Omega-3.  If patient is receiving MAC/IVCS, weight loss oral/injectable medications will need to be held for 7 days prior to injection.  Patient informed to fast 8 hours prior to procedure and 10-12 hours prior to procedure with IVCS/MAC if patient is on a weight loss medication.   If on blood thinner, clearance has been received and approved to hold this medication by provider.   Patient informed of ENDO's  policy:  he/she will need a  to and from procedure.   Endoscopy is located in the [facility] [address], [facility], [address]. 2nd floor to check in.     may park in the blue/green lot.  Patient verbalized understanding and agrees with plan.  Scheduled in Epic: Yes  Scheduled in Surgical Case: Yes  Follow up appointment made: NOV: 10/4/2024 Kyaw Schneider MD

## 2024-09-25 NOTE — TELEPHONE ENCOUNTER
Patient message forwarded on to  for review and to advise on possible pain medications.  ([place]-325 mg last prescribed on 9/6/24 #10 by Dr.Megan Ingram - [facility].)

## 2024-09-25 NOTE — TELEPHONE ENCOUNTER
From: Samuel Hill  To: JANEE WEBB  Sent: 9/24/2024 1:22 PM CDT  Subject: Test    I saw my drug test came back when am i able to get something for the pain i cant sit lay or relax in any position i need something until my shot …… also it says i smoke marijuana 70 times a month for some reason can u change that i smoke mairjuan recreationally few times a month but not 70 times and i actually have stoped because its been giving me anxiety , thank you

## 2024-09-26 NOTE — TELEPHONE ENCOUNTER
Patient paged after hours stating he did not receive his pain medication and is in severe low back pain. He had a urine drug screen yesterday and has been scheduled for an epidural injection on October 4th but states he needs something to hold him over until the procedure. Nursing notes and Dr. Schneider's notes reviewed and I have sent Norco 5/325mg Q8PRN, Cyclobenzaprine 10mg at bedtime PRN, Gabapentin 300mg three times daily. I advised the patient to follow up for his epidural as scheduled and to call the office if he has any further questions.    Spencer Catherine DO, FAAPMR & CAQSM  Physical Medicine and Rehabilitation/Sports Medicine  [facility]

## 2024-09-26 NOTE — TELEPHONE ENCOUNTER
Patient called to inquire about medications being sent for him to get him through until his injections.    This RN notes and advised patient that on-call provider sent in 3 medications- norco 5-325, cyclobenzaprine and gabapentin to the [facility] on [facility] and that we show receipt confirmation from pharmacy on 9/25/2024 at 7:48 pm.  Advised patient to contact his pharmacy to see when they will be ready.    Patient verbalized understanding.

## 2024-09-27 LAB
CODEINE UR: NEGATIVE
HYDROCODONE UR: NEGATIVE
HYDROMORPH UR: NEGATIVE
MORPHINE CONF UR: 1795 NG/ML
MORPHINE UR: POSITIVE
OPIATES CLASS UR: POSITIVE NG/ML

## 2024-09-28 LAB
BENZODIAZEPINES CLASS UR: POSITIVE
FENTANYL/NORFENT CLASS UR: NEGATIVE
FENTANYL/NORFENT CLASS UR: NEGATIVE

## 2024-09-29 LAB
CARBOXY THC GCMS UR: 83 NG/MG CREAT
CARBOXY THC GCMS UR: 83 NG/MG CREAT

## 2024-10-02 NOTE — PAT NURSING NOTE
Patient aware to hold NSAIDS prior to procedure.   Instructed patient to refrain from using cannabis at least 48 hours before his procedure.

## 2024-10-04 ENCOUNTER — APPOINTMENT (OUTPATIENT)
Dept: GENERAL RADIOLOGY | Facility: HOSPITAL | Age: 29
End: 2024-10-04
Attending: PHYSICAL MEDICINE & REHABILITATION
Payer: MEDICAID

## 2024-10-04 ENCOUNTER — HOSPITAL ENCOUNTER (OUTPATIENT)
Facility: HOSPITAL | Age: 29
Setting detail: HOSPITAL OUTPATIENT SURGERY
Discharge: HOME | End: 2024-10-04
Attending: PHYSICAL MEDICINE & REHABILITATION | Admitting: PHYSICAL MEDICINE & REHABILITATION
Payer: MEDICAID

## 2024-10-04 VITALS
HEIGHT: 74 IN | HEART RATE: 91 BPM | OXYGEN SATURATION: 98 % | SYSTOLIC BLOOD PRESSURE: 145 MMHG | RESPIRATION RATE: 14 BRPM | WEIGHT: 280 LBS | DIASTOLIC BLOOD PRESSURE: 101 MMHG | BODY MASS INDEX: 35.94 KG/M2

## 2024-10-04 PROBLEM — M51.26 LUMBAR DISC HERNIATION: Status: ACTIVE | Noted: 2024-10-04

## 2024-10-04 PROCEDURE — 3E0S3BZ INTRODUCTION OF ANESTHETIC AGENT INTO EPIDURAL SPACE, PERCUTANEOUS APPROACH: ICD-10-PCS | Performed by: PHYSICAL MEDICINE & REHABILITATION

## 2024-10-04 PROCEDURE — 62323 NJX INTERLAMINAR LMBR/SAC: CPT | Performed by: PHYSICAL MEDICINE & REHABILITATION

## 2024-10-04 PROCEDURE — 99152 MOD SED SAME PHYS/QHP 5/>YRS: CPT | Performed by: PHYSICAL MEDICINE & REHABILITATION

## 2024-10-04 PROCEDURE — 3E0S33Z INTRODUCTION OF ANTI-INFLAMMATORY INTO EPIDURAL SPACE, PERCUTANEOUS APPROACH: ICD-10-PCS | Performed by: PHYSICAL MEDICINE & REHABILITATION

## 2024-10-04 RX ORDER — BETAMETHASONE SODIUM PHOSPHATE AND BETAMETHASONE ACETATE 3; 3 MG/ML; MG/ML
INJECTION, SUSPENSION INTRA-ARTICULAR; INTRALESIONAL; INTRAMUSCULAR; SOFT TISSUE
Status: DISCONTINUED | OUTPATIENT
Start: 2024-10-04 | End: 2024-10-04

## 2024-10-04 RX ORDER — IOPAMIDOL 408 MG/ML
INJECTION, SOLUTION INTRATHECAL
Status: DISCONTINUED | OUTPATIENT
Start: 2024-10-04 | End: 2024-10-04

## 2024-10-04 RX ORDER — LIDOCAINE HYDROCHLORIDE 10 MG/ML
INJECTION, SOLUTION EPIDURAL; INFILTRATION; INTRACAUDAL; PERINEURAL
Status: DISCONTINUED | OUTPATIENT
Start: 2024-10-04 | End: 2024-10-04

## 2024-10-04 RX ORDER — SODIUM CHLORIDE 9 MG/ML
INJECTION, SOLUTION INTRAMUSCULAR; INTRAVENOUS; SUBCUTANEOUS
Status: DISCONTINUED | OUTPATIENT
Start: 2024-10-04 | End: 2024-10-04

## 2024-10-04 RX ORDER — SODIUM CHLORIDE, SODIUM LACTATE, POTASSIUM CHLORIDE, CALCIUM CHLORIDE 600; 310; 30; 20 MG/100ML; MG/100ML; MG/100ML; MG/100ML
INJECTION, SOLUTION INTRAVENOUS CONTINUOUS
Status: DISCONTINUED | OUTPATIENT
Start: 2024-10-04 | End: 2024-10-04

## 2024-10-04 RX ORDER — MIDAZOLAM HYDROCHLORIDE 1 MG/ML
1 INJECTION INTRAMUSCULAR; INTRAVENOUS EVERY 5 MIN PRN
Status: DISCONTINUED | OUTPATIENT
Start: 2024-10-04 | End: 2024-10-04

## 2024-10-04 RX ORDER — SODIUM CHLORIDE 0.9 % (FLUSH) 0.9 %
10 SYRINGE (ML) INJECTION AS NEEDED
Status: DISCONTINUED | OUTPATIENT
Start: 2024-10-04 | End: 2024-10-04

## 2024-10-04 NOTE — INTERVAL H&P NOTE
Pre-op Diagnosis: Lumbar disc herniation    The above referenced H&P was reviewed by JANEE WEBB MD on 10/4/2024, the patient was examined and no significant changes have occurred in the patient's condition since the H&P was performed.  I discussed with the patient and/or legal representative the potential benefits, risks and side effects of this procedure; the likelihood of the patient achieving goals; and potential problems that might occur during recuperation.  I discussed reasonable alternatives to the procedure, including risks, benefits and side effects related to the alternatives and risks related to not receiving this procedure.  We will proceed with procedure as planned.

## 2024-10-04 NOTE — DISCHARGE SUMMARY
Outpatient Surgery Brief Discharge Summary         Patient ID:  Samuel Hill  [id number]  29 year old  5/13/1995    Discharge Diagnoses: Lumbar disc herniation     Procedures: Caudal epidural steroid injection    Discharged Condition: stable    Disposition: home    Patient Instructions: Follow-up with JANEE SCHNEIDER MD in 1-2 weeks.    Diet: regular diet  Activity: Ad tre.    Janee Schneider MD  10/4/2024  3:03 PM

## 2024-10-04 NOTE — DISCHARGE INSTRUCTIONS
[facility] ENDOSCOPY [facility] SUITES  [address]  [facility] [address]  Dept: [phone]  Loc: [phone]  No name on file       Post Injection/Procedure Instructions    PAIN:  Your usual pain should gradually decrease in 2-3 days, but relief may sometimes take up to two weeks after your procedure.  A temporary flare-up of pain for 1-2 days after a procedure is also normal.  Please take your usual pain medicines if this happens.      ACTIVITY LEVELS:  Day of Procedure:  Take it easy.  We recommend no new activities or heavy work.  Avoid sitting or standing for long periods of time and change your position as needed.  Day 2:  You may return to normal activities within reason.  If your physician gives you specific instructions, please follow these.  You may return to physical therapy.      DIET:  Return to your normal diet as tolerated.    MEDICATIONS:  Aspirin and Blood-thinners:  Follow specific instructions your doctor gave you.  Otherwise, refrain from taking aspirin and other blood-thinning medications for 6 hours after your injection.  Then resume your next scheduled dose.    Resume your other medications the day of the injection.    BANDAGE:  Remove after 24 hours.    SHOWERING:  You may shower the following day.  No bathing, swimming, or hot tub for 2 days after the procedure to reduce the risk of infection.    COLD PACKS:  You may use ice compresses over the injection site - 20 minutes on, then 40 minutes off as needed.  To avoid skin injury, place a thin cloth between cold pack and the skin.  Do not apply cold packs to numb areas following injection.    Contact [facility] immediately if you experience any of the following:  Fever (over 100 degrees F), chills, drainage, excessive swelling or redness from the injection site, problems with bowel or bladder control, or new weakness or new severe pain.  If you cannot reach your physician, please go to the nearest  emergency room.      COMMON SIDE EFFECTS:  Numbness for 4 to 8 hours due to the local anesthetic.  Minor pain at the injection site.  A small amount of bleeding at the injection site.  If a steroid medication was used during the procedure, possible side effects include redness of the face, headache, trouble sleeping, indigestion, increased appetite and/or a low grade fever (less that 100 degrees F).  All side effects should disappear within 1 to 3 days after the procedure.    POST-PROCEDURAL HEADACHE:  Mild headaches may be a normal reaction after a steroid injection.  Lie down as much as possible for the first 24 hours.  You can take Tylenol up to 3 grams per day in doses of 1 gram every 8 hours.  Drink plenty of caffeinated beverages.  If you continue to have headaches after 24 hours following the procedure, or experience severe headaches, please contact our office.

## 2024-10-04 NOTE — PROCEDURES
Preoperative Diagnosis: Lumbar radiculopathy, lumbar stenosis with neurogenic claudication    Postoperative Diagnosis: Lumbar radiculopathy, lumbar stenosis with neurogenic claudication    Procedures:  Caudal epidural steroid injection under fluoroscopic guidance and contrast enhancement.    Surgeon:  Kyaw Schneider M.D.    Anesthesia:  Conscious Sedation      OPERATIVE PROCEDURE:  The patient was consented.  He was brought into the operating room suite and placed on the fluoroscopy table in the prone position.  He was sterilely prepped and draped in routine fashion.  The sacral hiatus was identified.  The overlying skin was anesthetized.  A 22-gauge spinal needle was introduced into the epidural space.  Needle placement was verified using fluoroscopy and radiographic contrast showing an epidurogram.  There was no withdrawal of blood of CSF from the needle.  Radiographic interpretation was that the needle was in proper position for a caudal epidural steroid injection.  I placed a mixture of 3mL of 6mg/mL Celestone and 12mL of 1% preservative-free lidocaine into the epidural space.  The patient tolerated the procedure well without any immediate complications.    Conscious sedation was used for the procedure using Versed and fentanyl using continuous cardiovascular monitoring.  There were no ill effects from the procedure.  The patient was arousable throughout the procedure.  Duration of the procedure was approximately 15 minutes.    The patient tolerated the procedure well without any immediate complications.    He was given discharge instructions and is to follow up with me in approximately two weeks.

## 2024-10-07 DIAGNOSIS — M51.26 LUMBAR DISC HERNIATION: ICD-10-CM

## 2024-10-07 RX ORDER — HYDROCODONE BITARTRATE AND ACETAMINOPHEN 5; 325 MG/1; MG/1
1 TABLET ORAL EVERY 8 HOURS PRN
Qty: 30 TABLET | Refills: 0 | Status: SHIPPED | OUTPATIENT
Start: 2024-10-07

## 2024-10-07 RX ORDER — CYCLOBENZAPRINE HCL 10 MG
10 TABLET ORAL NIGHTLY PRN
Qty: 30 TABLET | Refills: 0 | Status: SHIPPED | OUTPATIENT
Start: 2024-10-07

## 2024-10-07 NOTE — TELEPHONE ENCOUNTER
Letter sent and meds sent to pharmacy.    This is extremely important.  He needs an appointment to see me within 2 weeks.  Put him in on Thursday, October 17 at 1130.  Thank you

## 2024-10-07 NOTE — TELEPHONE ENCOUNTER
Condition update after Procedure.    - Patient had CAUDAL EPIDURAL STEROID INJECTION UNDER FLUOROSCOPY  (specific procedure) on 10/4/2024 (date) with .      Patient stated that he was advised to call Monday if his pain was still excruciating 10/10.    If pain is worse:  - Pain level prior to procedure:   10  - Current pain level:  10    - Pain location: low back traveling down the left leg and down the middle two toes of the left foot.  - Pain description: sharp/stabbing  - Current pain treatment: currently not taking anything other than Gabapentin as he run out of medication. Prior he also took Cyclobenzaprine and Norco 5-325 mg       Patient was educated that the injection takes about 2 weeks to take full effect  -Patient was asking if he can get something for pain until the injection starts working as he states he is unable to sleep or to work at all. -Pended refills for norco and flexeril again and routing to Dr Schneider    -Patient also is asking if Dr Schneider can write a letter explaining \"how serious his situation is\" and write restrictions if recommended for his work or to be off work, and patient is in excruciating pain. -Pended a letter and routing to Dr Schneider to review.

## 2024-10-11 ENCOUNTER — APPOINTMENT (OUTPATIENT)
Dept: PHYSICAL THERAPY | Age: 29
End: 2024-10-11
Attending: PHYSICAL MEDICINE & REHABILITATION
Payer: MEDICAID

## 2024-10-11 ENCOUNTER — TELEPHONE (OUTPATIENT)
Dept: PHYSICAL THERAPY | Facility: HOSPITAL | Age: 29
End: 2024-10-11

## 2024-10-17 ENCOUNTER — TELEMEDICINE (OUTPATIENT)
Dept: PHYSICAL MEDICINE AND REHAB | Facility: CLINIC | Age: 29
End: 2024-10-17
Payer: MEDICAID

## 2024-10-17 ENCOUNTER — APPOINTMENT (OUTPATIENT)
Dept: PHYSICAL THERAPY | Age: 29
End: 2024-10-17
Attending: PHYSICAL MEDICINE & REHABILITATION
Payer: MEDICAID

## 2024-10-17 DIAGNOSIS — M51.26 LUMBAR DISC HERNIATION: Primary | ICD-10-CM

## 2024-10-17 PROCEDURE — 99213 OFFICE O/P EST LOW 20 MIN: CPT | Performed by: PHYSICAL MEDICINE & REHABILITATION

## 2024-10-17 NOTE — PROGRESS NOTES
[facility]    Telemedicine Visit - Follow-up Evaluation    Samuel Hill verbally consents to a Telemedicine Visit on 10/17/24. This visit is conducted using Telemedicine with live, interactive audio and video.    Patient understands and accepts financial responsibility for any deductible, co-insurance and/or co-pays associated with this service. The patient understands they can stop the telemedicine visit at any time.    CHIEF COMPLAINT:  Lumbar disc herniation with radiculopathy    History of Present Illness:  The patient is a 29 year old. male who presents for follow up.  Since his last visit, he had a second lumbar epidural injection on October 4 for a herniated disc.  Unfortunately did not work.  He is seen today for telemedicine visit because he fears he is positive for COVID.  Symptoms are the same.      PAST MEDICAL HISTORY:     Past Medical History:    Anxiety    Back pain    Back problem    Depression    History of stomach ulcers         PAST SURGICAL HISTORY:     Past Surgical History:   Procedure Laterality Date    Adenoidectomy      Appendectomy  03/2023    Create eardrum opening,gen anesth      Other      ear tubes    Removal adenoids,primary,12+ y/o      Removal of tonsils,12+ y/o      and ademoids    Tonsillectomy           CURRENT MEDICATIONS:     Current Outpatient Medications   Medication Sig Dispense Refill    HYDROcodone-acetaminophen 5-325 MG Oral Tab Take 1 tablet by mouth every 8 (eight) hours as needed for Pain. 30 tablet 0    cyclobenzaprine 10 MG Oral Tab Take 1 tablet (10 mg total) by mouth nightly as needed for Muscle spasms. 30 tablet 0    gabapentin 300 MG Oral Cap Start with night time dose only. If well tolerated, increase to two times daily. If well tolerated, increase to three times daily. 90 capsule 0    methylPREDNISolone (MEDROL) 4 MG Oral Tablet Therapy Pack Dosepack: take as directed (Patient not taking: Reported on  10/4/2024) 1 each 0    alprazolam 2 MG Oral Tab Take 1 tablet (2 mg total) by mouth As Directed.           ALLERGIES:   Allergies[1]      FAMILY HISTORY:     Family History   Problem Relation Age of Onset    Arthritis Mother     Diabetes Paternal Grandmother     Heart Disorder Maternal Grandmother     Heart Disorder Maternal Grandfather     Diabetes Maternal Grandfather           SOCIAL HISTORY:     Social History     Socioeconomic History    Marital status: Single   Tobacco Use    Smoking status: Never     Passive exposure: Yes    Smokeless tobacco: Never   Vaping Use    Vaping status: Never Used   Substance and Sexual Activity    Alcohol use: Yes     Comment: weekends    Drug use: Yes     Frequency: 70.0 times per week     Types: Cannabis     Comment: 10 times per day   Other Topics Concern    Caffeine Concern No    Exercise No     Social Drivers of Health     Financial Resource Strain: Low Risk  (1/23/2023)    Received from [facility], [facility]    Financial Resource Strain     In the past year, have you or any family members you live with been unable to get any of the following when it was really needed? Check all that apply.: None   Food Insecurity: No Food Insecurity (1/23/2023)    Received from [facility], [facility]    Food Insecurity     RETIRE How often in the past 12 months would you say you are worried or stressed about having enough money to buy nutritious meals? : Never   Transportation Needs: No Transportation Needs (1/23/2023)    Received from [facility], [facility], [facility]    PRAPARE - Transportation     In the past 12 months, has lack of transportation kept you from medical appointments or from getting medications?: No     In the past 12 months, has lack of transportation kept you from meetings, work, or from getting things needed for daily living?: No   Social Connections: Socially Integrated (1/24/2023)     Received from [facility], [facility]    Social Connections     How often do you see or talk to people that you care about and feel close to? (For example: talking to friends on the phone, visiting friends or family, going to [employer] witness or club meetings): 5 or more times a week            PHYSICAL EXAM:   Constitutional: Healthy appearing, well-developed, no acute distress  Eyes: Conjunctivae are clear, extra-occular movements intact  Ears/Nose/Mouth/Throat:  External inspection identifies normal appearance without obvious deformity  Respiratory: Non-labored respirations  Neurological: alert & oriented x 3, attentive, able to follow commands, comprehention intact, receptive and expressive speech intact, cranial nerves III-XII intact  Psychiatric: Mood and affect appropriate, no agitation            IMAGING:   I reviewed the lumbar MRI from September 20, 2024 showing a large L45 disc extrusion with central and bilateral components.       ASSESSMENT AND PLAN:   1. Lumbar disc herniation  He has physical therapy scheduled and I encouraged him to follow through with his appointments.  He has had 2 epidurals which have not worked and has surgically amenable pathology so I recommend he see one of the neurosurgeons for their opinion.  - Neurosurgery Referral - In Network        Follow-up: As needed    We discussed that a telemedicine visit is in place of an office visit; however, this limits the ability to perform a thorough physical examination which may affect objective findings related to a specific condition and can affect treatment.      The patient verbalized understanding with this plan and was in agreement.  There are no barriers to learning.  All questions were answered.      Kyaw Schneider MD  Physical Medicine and Rehabilitation/Sports Medicine  [facility]         [1] No Known Allergies

## 2024-10-18 ENCOUNTER — APPOINTMENT (OUTPATIENT)
Dept: PHYSICAL THERAPY | Age: 29
End: 2024-10-18
Attending: PHYSICAL MEDICINE & REHABILITATION
Payer: MEDICAID

## 2024-10-20 ENCOUNTER — PATIENT MESSAGE (OUTPATIENT)
Dept: PHYSICAL MEDICINE AND REHAB | Facility: CLINIC | Age: 29
End: 2024-10-20

## 2024-10-20 DIAGNOSIS — M51.26 LUMBAR DISC HERNIATION: ICD-10-CM

## 2024-10-21 NOTE — TELEPHONE ENCOUNTER
Refill Request    Medication request:   HYDROcodone-acetaminophen 5-325 MG Oral Tab       LOV: 10/17/2024 Kyaw Schneider MD   RTC: PRN  NOV: Visit date not found      [place]: 10/7/2024 #10 days    UDS: (if applicable): 9/24/2024  Pain contract: Valid through 10/8/2025    LOV plan (if weaning or changing medications): N/A

## 2024-10-22 ENCOUNTER — TELEPHONE (OUTPATIENT)
Dept: PHYSICAL THERAPY | Facility: HOSPITAL | Age: 29
End: 2024-10-22

## 2024-10-22 ENCOUNTER — APPOINTMENT (OUTPATIENT)
Dept: PHYSICAL THERAPY | Age: 29
End: 2024-10-22
Attending: PHYSICAL MEDICINE & REHABILITATION
Payer: MEDICAID

## 2024-10-22 RX ORDER — CYCLOBENZAPRINE HCL 10 MG
10 TABLET ORAL NIGHTLY PRN
Qty: 30 TABLET | Refills: 0 | Status: SHIPPED | OUTPATIENT
Start: 2024-10-22

## 2024-10-22 RX ORDER — HYDROCODONE BITARTRATE AND ACETAMINOPHEN 5; 325 MG/1; MG/1
1 TABLET ORAL EVERY 8 HOURS PRN
Qty: 30 TABLET | Refills: 0 | Status: SHIPPED | OUTPATIENT
Start: 2024-10-22

## 2024-10-22 NOTE — TELEPHONE ENCOUNTER
Please see also [employer] message from 10/20/24.    Patient called office today and stated that he is out of Norco and also out of Flexeril.     Patient has not scheduled surgery yet and will call again today to try to set it up.   Patient also wanted to let Dr Schneider know that he  did start PT but it was so painful and he is not sure if he is able to endure the pain during PT. Patient asking for refill until he is able to have the surgery done.     Refill Request    Medication request: cyclobenzaprine 10 MG Oral Tab. Take 1 tablet (10 mg total) by mouth nightly as needed for Muscle spasms.     LOV:10/17/2024 Kyaw Schneider MD   Due back to clinic per last office note:  Follow-up: As needed   NOV: Visit date not found      [place]: 9/25/24 #30 (30 days)    LOV plan (if weaning or changing medications):  not mentioned    Routing to Dr Schneider as high priority.

## 2024-10-23 NOTE — TELEPHONE ENCOUNTER
Please let the patient know that I will refill his Norco for 2 months, which should give him plenty of time to get into see the surgeon and make arrangements.  If he does not get surgery scheduled within 2 months he will need to obtain [place] from his primary physician after that.

## 2024-10-24 ENCOUNTER — APPOINTMENT (OUTPATIENT)
Dept: PHYSICAL THERAPY | Age: 29
End: 2024-10-24
Attending: PHYSICAL MEDICINE & REHABILITATION
Payer: MEDICAID

## 2024-10-29 ENCOUNTER — APPOINTMENT (OUTPATIENT)
Dept: PHYSICAL THERAPY | Age: 29
End: 2024-10-29
Attending: PHYSICAL MEDICINE & REHABILITATION
Payer: MEDICAID

## 2024-10-31 ENCOUNTER — APPOINTMENT (OUTPATIENT)
Dept: PHYSICAL THERAPY | Age: 29
End: 2024-10-31
Attending: PHYSICAL MEDICINE & REHABILITATION
Payer: MEDICAID

## 2024-11-18 ENCOUNTER — PATIENT MESSAGE (OUTPATIENT)
Dept: PHYSICAL MEDICINE AND REHAB | Facility: CLINIC | Age: 29
End: 2024-11-18

## 2024-11-18 DIAGNOSIS — M51.26 LUMBAR DISC HERNIATION: ICD-10-CM

## 2024-11-18 RX ORDER — HYDROCODONE BITARTRATE AND ACETAMINOPHEN 5; 325 MG/1; MG/1
1 TABLET ORAL EVERY 8 HOURS PRN
Qty: 30 TABLET | Refills: 0 | Status: SHIPPED | OUTPATIENT
Start: 2024-11-18

## 2024-11-18 NOTE — TELEPHONE ENCOUNTER
Norco sent.  This is the last refill I can give him.  He should make sure to make it to his neurosurgery appointment.  Otherwise he can obtain [place] from his primary physician after that.

## 2024-11-18 NOTE — TELEPHONE ENCOUNTER
I think it is best to hold on the third injection for now.  He should see the surgeon first because he will do 2 injections did very little.

## 2024-11-18 NOTE — TELEPHONE ENCOUNTER
Per Dr. Schneider's LOV note on 10/17/2024: \"1. Lumbar disc herniation  He has physical therapy scheduled and I encouraged him to follow through with his appointments.  He has had 2 epidurals which have not worked and has surgically amenable pathology so I recommend he see one of the neurosurgeons for their opinion.  - Neurosurgery Referral - In Network           Follow-up: As needed\"

## 2024-11-18 NOTE — TELEPHONE ENCOUNTER
Refill Request    Medication request:   HYDROcodone-acetaminophen 5-325 MG Oral Tab       LOV: 10/17/2024 Kyaw Schneider MD   RTC: PRN  NOV: Visit date not found      [place]: 10/22/2024 #10 days    UDS: (if applicable): 9/24/2024  Pain contract: Valid through 10/8/2025    LOV plan (if weaning or changing medications): N/A

## 2024-11-25 ENCOUNTER — TELEPHONE (OUTPATIENT)
Dept: SURGERY | Facility: CLINIC | Age: 29
End: 2024-11-25

## 2024-11-25 ENCOUNTER — OFFICE VISIT (OUTPATIENT)
Dept: SURGERY | Facility: CLINIC | Age: 29
End: 2024-11-25
Payer: MEDICAID

## 2024-11-25 VITALS — SYSTOLIC BLOOD PRESSURE: 125 MMHG | HEART RATE: 86 BPM | DIASTOLIC BLOOD PRESSURE: 80 MMHG

## 2024-11-25 DIAGNOSIS — M48.061 STENOSIS OF LATERAL RECESS OF LUMBAR SPINE: ICD-10-CM

## 2024-11-25 DIAGNOSIS — M51.26 LUMBAR DISC HERNIATION: ICD-10-CM

## 2024-11-25 DIAGNOSIS — M47.816 LUMBAR SPONDYLOSIS: ICD-10-CM

## 2024-11-25 DIAGNOSIS — M51.362 DEGENERATION OF INTERVERTEBRAL DISC OF LUMBAR REGION WITH DISCOGENIC BACK PAIN AND LOWER EXTREMITY PAIN: ICD-10-CM

## 2024-11-25 DIAGNOSIS — M54.16 LUMBAR RADICULOPATHY: ICD-10-CM

## 2024-11-25 DIAGNOSIS — M51.16 LUMBAR DISC HERNIATION WITH RADICULOPATHY: Primary | ICD-10-CM

## 2024-11-25 RX ORDER — HYDROCODONE BITARTRATE AND ACETAMINOPHEN 5; 325 MG/1; MG/1
1 TABLET ORAL EVERY 8 HOURS PRN
Qty: 30 TABLET | Refills: 0 | Status: SHIPPED | OUTPATIENT
Start: 2024-11-25

## 2024-11-25 NOTE — TELEPHONE ENCOUNTER
Message below noted.    Discussed with Provider. Patient is a new patient and will be required to be seen in office for appointment.     will be reaching out to patient.

## 2024-11-25 NOTE — TELEPHONE ENCOUNTER
You are scheduled for L4-5 minimally invasive discectomy, approaching from the left, on 01/15/2025 with  at [facility].     PCP clearance is needed within 30 days of surgery.  We have faxed a request for pre-op clearance to your primary care physician Dr. Zhou. Please contact their office for appointment.  Please schedule this appointment AT LEAST 1 WEEK PRIOR TO SURGERY DATE.  Your PCP may order additional testing or clearance from another specialist.   You will have an appointment with our RN Spine Navigator prior to surgery.  This is an educational telehealth/phone visit in which you will receive more information on the specifics of your surgery, instructions for before surgery, what to expect in the hospital and how to take care of yourself after surgery.  Your surgeon wants you to to participate in this visit so that you are as prepared for surgery as possible and have an opportunity to ask questions.  If possible, we would like your care partner to be present for the visit as well.    You will need to contact the Pre-admission department at [phone]. The Pre-Admission nurse will review your health history and give you information for day-of instructions. The nurse will also get you scheduled for all your pre-op testing required for your surgery.   You will need pre-operative labs which will include blood work and a MRSA/MSSA test (nasal swab). You will need for fast for the blood work. You may also need an EKG and/or chest x-ray depending on your current health status.    Do not take any blood thinning medications such as over the counter NSAIDS (advil, aleve, ibuprofen etc.), herbal supplements (garlic,tumeric etc.), vitamin E, fish oil or krill oil for at least 7 days prior to surgery.  If you have questions about your other medications, please call our office or send a [employer] message.   You should have nothing to eat or drink after 11:00pm the night prior to surgery except for the  following:  Do drink 12 ounces of regular Gatorade (NOT RED) 12 hours and 4 hours prior to your scheduled surgery time. Do not drink any other liquids (including water) before your surgery. Take 1000 mg of Tylenol (Acetaminophen) 4 hours before your scheduled surgery time, take this with your scheduled Gatorade.  In order to prevent infection, you will need to purchase Hibiclens soap and use it after your regular body soap for 5 days prior to your procedure.  The last shower should be the night before surgery.  This soap can be found at any pharmacy in the first aid section. See detailed instructions below.    Our office will get prior authorization for surgery through your insurance.   Surgery is usually scheduled as outpatient admission.  This is an estimate and varies from person to person.  Ultimately, the surgeon will determine when you are ready to be discharged.  The hospital will contact you 1-2 days before surgery with your arrival time.         If you require FMLA or disability paperwork for your recovery, please make sure to either drop it off or have it faxed to our office at [phone]. Be sure the form has your name and date of birth on it.  The form will be faxed to our Forms Department and they will complete it for you.  There is a 25$ fee for all forms that need to be filled out.  Please be aware there is a 10-14 day turnaround time.  You will need to sign a release of information (ANA) form if your paperwork does not come with one.  You may call the Forms Department with any questions at [phone].  Their fax number is [phone].      POST OP CARE--First Two Weeks  No bending at waist, twisting, pushing or pulling  No lifting more than 10 lb (a gallon of milk)  Wear cervical collar/lumbar brace, if prescribed, as instructed by surgeon  No overhead reaching  No driving until approved by your surgeon   Change positions frequently. No prolonged sitting or standing.  Increase walking as  tolerated to avoid blood clots  No NSAIDs until approved by surgeon (ibuprofen, aleve, advil, meloxicam, Celebrex, diclofenac etc).   Remove any bandage on post op day 3.  Leave incision open to air.  Glue will fall off on its own.  If you have staples or sutures that are not dissolvable, these will be removed at your 2 or 3 week post op visit.   Check your incision for signs of infection daily (redness, warmth, drainage, increased pain at incision site, fever)  Do not shower until post op day 3.  Do not allow water pressure to directly hit the incision.  Do not scrub the incision and avoid any lotion, creams or perfume near the incision site.  No swimming, hot tubs or baths until your incision is completely healed.  Take pain medication as prescribed, if needed.  Constipation is a common side effect of opioids.  If you experience constipation, drink plenty of water and take over-the-counter stool softeners daily.   Avoid nicotine and alcohol   When to call the office:  Increased or change in location of pain  Increased weakness in arms or legs  Incision drainage, redness or warmth  Fever  Bowel or bladder changes   Choking on food or liquids (cervical)  Pain, redness, swelling, color changes or warmth in lower leg     Hibiclens Bathing  Hibiclens is a body soap that is used before surgery to protect you from getting an infection post-operatively  Hibiclens comes in a large blue bottle and can be found in most pharmacies in the First Aid supplies  Shower with this daily for FIVE consecutive days before surgery, using the entire bottle over the five days.  The last shower should be the night before surgery.   Steps to bathing with Hibiclens  Do not use Hibiclens on your hair, face or private areas  Wash your hair and body as normal with your usual cleansers  Rinse well  Using a clean wet washcloth apply enough Hibiclens to cover your body. Wash from the neck down avoiding the genital areas and concentrating on the  surgical area  Rinse well  Dry yourself with a clean, dry towel  Do not use any powders, creams, lotions or sprays on your body as these attract bacteria  Deodorant, hand lotion and facial creams are acceptable.  For Office Use Only:  Medical Clearance Requested: PCP  PA: Medicaid  CPT Codes: NEED

## 2024-11-25 NOTE — PATIENT INSTRUCTIONS
You are scheduled for L4-5 minimally invasive discectomy, approaching from the left, on 01/15/2025 with  at [facility].    PCP clearance is needed within 30 days of surgery.  We have faxed a request for pre-op clearance to your primary care physician Dr. Zhou. Please contact their office for appointment.  Please schedule this appointment AT LEAST 1 WEEK PRIOR TO SURGERY DATE.  Your PCP may order additional testing or clearance from another specialist.   You will have an appointment with our RN Spine Navigator prior to surgery.  This is an educational telehealth/phone visit in which you will receive more information on the specifics of your surgery, instructions for before surgery, what to expect in the hospital and how to take care of yourself after surgery.  Your surgeon wants you to to participate in this visit so that you are as prepared for surgery as possible and have an opportunity to ask questions.  If possible, we would like your care partner to be present for the visit as well.    You will need to contact the Pre-admission department at [phone]. The Pre-Admission nurse will review your health history and give you information for day-of instructions. The nurse will also get you scheduled for all your pre-op testing required for your surgery.   You will need pre-operative labs which will include blood work and a MRSA/MSSA test (nasal swab). You will need for fast for the blood work. You may also need an EKG and/or chest x-ray depending on your current health status.    Do not take any blood thinning medications such as over the counter NSAIDS (advil, aleve, ibuprofen etc.), herbal supplements (garlic,tumeric etc.), vitamin E, fish oil or krill oil for at least 7 days prior to surgery.  If you have questions about your other medications, please call our office or send a [employer] message.   You should have nothing to eat or drink after 11:00pm the night prior to surgery except for the  following:  Do drink 12 ounces of regular Gatorade (NOT RED) 12 hours and 4 hours prior to your scheduled surgery time. Do not drink any other liquids (including water) before your surgery. Take 1000 mg of Tylenol (Acetaminophen) 4 hours before your scheduled surgery time, take this with your scheduled Gatorade.  In order to prevent infection, you will need to purchase Hibiclens soap and use it after your regular body soap for 5 days prior to your procedure.  The last shower should be the night before surgery.  This soap can be found at any pharmacy in the first aid section. See detailed instructions below.    Our office will get prior authorization for surgery through your insurance.   Surgery is usually scheduled as outpatient admission.  This is an estimate and varies from person to person.  Ultimately, the surgeon will determine when you are ready to be discharged.  The hospital will contact you 1-2 days before surgery with your arrival time.       If you require FMLA or disability paperwork for your recovery, please make sure to either drop it off or have it faxed to our office at [phone]. Be sure the form has your name and date of birth on it.  The form will be faxed to our Forms Department and they will complete it for you.  There is a 25$ fee for all forms that need to be filled out.  Please be aware there is a 10-14 day turnaround time.  You will need to sign a release of information (ANA) form if your paperwork does not come with one.  You may call the Forms Department with any questions at [phone].  Their fax number is [phone].     POST OP CARE--First Two Weeks  No bending at waist, twisting, pushing or pulling  No lifting more than 10 lb (a gallon of milk)  Wear cervical collar/lumbar brace, if prescribed, as instructed by surgeon  No overhead reaching  No driving until approved by your surgeon   Change positions frequently. No prolonged sitting or standing.  Increase walking as tolerated  to avoid blood clots  No NSAIDs until approved by surgeon (ibuprofen, aleve, advil, meloxicam, Celebrex, diclofenac etc).   Remove any bandage on post op day 3.  Leave incision open to air.  Glue will fall off on its own.  If you have staples or sutures that are not dissolvable, these will be removed at your 2 or 3 week post op visit.   Check your incision for signs of infection daily (redness, warmth, drainage, increased pain at incision site, fever)  Do not shower until post op day 3.  Do not allow water pressure to directly hit the incision.  Do not scrub the incision and avoid any lotion, creams or perfume near the incision site.  No swimming, hot tubs or baths until your incision is completely healed.  Take pain medication as prescribed, if needed.  Constipation is a common side effect of opioids.  If you experience constipation, drink plenty of water and take over-the-counter stool softeners daily.   Avoid nicotine and alcohol   When to call the office:  Increased or change in location of pain  Increased weakness in arms or legs  Incision drainage, redness or warmth  Fever  Bowel or bladder changes   Choking on food or liquids (cervical)  Pain, redness, swelling, color changes or warmth in lower leg    Hibiclens Bathing  Hibiclens is a body soap that is used before surgery to protect you from getting an infection post-operatively  Hibiclens comes in a large blue bottle and can be found in most pharmacies in the First Aid supplies  Shower with this daily for FIVE consecutive days before surgery, using the entire bottle over the five days.  The last shower should be the night before surgery.   Steps to bathing with Hibiclens  Do not use Hibiclens on your hair, face or private areas  Wash your hair and body as normal with your usual cleansers  Rinse well  Using a clean wet washcloth apply enough Hibiclens to cover your body. Wash from the neck down avoiding the genital areas and concentrating on the surgical  area  Rinse well  Dry yourself with a clean, dry towel  Do not use any powders, creams, lotions or sprays on your body as these attract bacteria  Deodorant, hand lotion and facial creams are acceptable.   Refill policies:    Allow 2-3 business days for refills; controlled substances may take longer.  Contact your pharmacy at least 5 days prior to running out of medication and have them send an electronic request or submit request through the “request refill” option in your [employer] account.  Refills are not addressed on weekends; covering physicians do not authorize routine medications on weekends.  No narcotics or controlled substances are refilled after noon on Fridays or by on call physicians.  By law, narcotics must be electronically prescribed.  A 30 day supply with no refills is the maximum allowed.  If your prescription is due for a refill, you may be due for a follow up appointment.  To best provide you care, patients receiving routine medications need to be seen at least once a year.  Patients receiving narcotic/controlled substance medications need to be seen at least once every 3 months.  In the event that your preferred pharmacy does not have the requested medication in stock (e.g. Backordered), it is your responsibility to find another pharmacy that has the requested medication available.  We will gladly send a new prescription to that pharmacy at your request.    Scheduling Tests:    If your physician has ordered radiology tests such as MRI or CT scans, please contact Central Scheduling at [phone] right away to schedule the test.  Once scheduled, the [facility] Centralized Referral Team will work with your insurance carrier to obtain pre-certification or prior authorization.  Depending on your insurance carrier, approval may take 3-10 days.  It is highly recommended patients assure they have received an authorization before having a test performed.  If test is done without insurance authorization, patient  may be responsible for the entire amount billed.      Precertification and Prior Authorizations:  If your physician has recommended that you have a procedure or additional testing performed the [facility] Centralized Referral Team will contact your insurance carrier to obtain pre-certification or prior authorization.    You are strongly encouraged to contact your insurance carrier to verify that your procedure/test has been approved and is a COVERED benefit.  Although the [facility] Centralized Referral Team does its due diligence, the insurance carrier gives the disclaimer that \"Although the procedure is authorized, this does not guarantee payment.\"    Ultimately the patient is responsible for payment.   Thank you for your understanding in this matter.  Paperwork Completion:  If you require FMLA or disability paperwork for your recovery, please make sure to either drop it off or have it faxed to our office at [phone]. Be sure the form has your name and date of birth on it.  The form will be faxed to our Forms Department and they will complete it for you.  There is a 25$ fee for all forms that need to be filled out.  Please be aware there is a 10-14 day turnaround time.  You will need to sign a release of information (ANA) form if your paperwork does not come with one.  You may call the Forms Department with any questions at [phone].  Their fax number is [phone].

## 2024-11-25 NOTE — H&P
[facility]  Neurological Surgery New Patient Clinic Note    Samuel Hill  5/13/1995  [id number]  PCP: Leo Zhou MD  Referring Provider: Kyaw Schneider MD    REASON FOR VISIT:  Lumbar disc herniation    HISTORY OF PRESENT ILLNESS:  Samuel Hill is a(n) 29 year old male with a history of low back pain radiating down the left leg, worsening over the past six months following a gym injury. He reports persistent severe pain, described as shooting from his left hamstring to his knee and wrapping around the left side of his leg, sometimes feeling like a high ankle sprain. He also experiences numbness in his toes and weakness in the leg. The pain is rated at 10/10 and is not relieved with hydrocodone (Norco), which he takes to manage pain and continue working. He notes that the pain is worse with sitting and lying down and slightly improves with activity. Previous epidural steroid injections provided only minimal and transient relief. He expresses a desire to proceed with surgical intervention to alleviate his symptoms and reduce dependence on medications.    PAST MEDICAL HISTORY:  Past Medical History:    Anxiety    Back pain    Back problem    Depression    History of stomach ulcers     PAST SURGICAL HISTORY:  Past Surgical History:   Procedure Laterality Date    Adenoidectomy      Appendectomy  03/2023    Create eardrum opening,gen anesth      Other      ear tubes    Removal adenoids,primary,12+ y/o      Removal of tonsils,12+ y/o      and ademoids    Tonsillectomy       FAMILY HISTORY:  family history includes Arthritis in his mother; Diabetes in his maternal grandfather and paternal grandmother; Heart Disorder in his maternal grandfather and maternal grandmother.    SOCIAL HISTORY:   reports that he has never smoked. He has been exposed to tobacco smoke. He has never used smokeless tobacco. He reports current alcohol use. He reports current drug use.  Frequency: 70.00 times per week. Drug: Cannabis.    ALLERGIES:  Allergies[1]    MEDICATIONS:  Medications Ordered Prior to Encounter[2]    REVIEW OF SYSTEMS:  All other systems were reviewed and were negative except for those previously mentioned in the HPI    PHYSICAL EXAMINATION:  General: No acute distress.  Respiratory: Non-labored respirations bilaterally. No audible wheezing  Cardiovascular: Extremities warm and well-perfused.  Abdomen: Soft, nontender, nondistended.   Musculoskeletal: Moves all extremities well, symmetrically.  Extremities: No edema.    NEUROLOGIC EXAMINATION:  Mental status: Alert and oriented x 3  Speech: Clear, fluent  Cranial nerves: PERRLA, EOMI, face symmetric, with normal strength and sensation, tongue and palate midline, SCM 5/5 bilaterally  Motor:     RIGHT  Delt 5/5   Bic 5/5  Tri 5/5   HI 5/5    5/5  IP 5/5   Quad 5/5   Ham 5/5   AT 5/5   EHL 5/5 Pamela 5/5     LEFT    Delt 5/5   Bic 5/5  Tri 5/5   HI 5/5    5/5  IP 5/5   Quad 5/5   Ham 5/5   AT 5/5   EHL 5/5 Pamela 5/5   No pronator drift  Tone: Normal  Atrophy/Fasciculations: None  Sensation: Normal to light touch, symmetric, no neglect  Cerebellar: Normal finger nose finger  Gait: Normal, nondistressed heel toe tandem gait      Reflexes: 2+ throughout, symmetric, no Mor's    IMAGING:  MR lumbar 9/20/2024: Right paracentral L4-5 disc herniation leading to right lateral recess stenosis and encroachment on the traversing L5 nerve root.  There is facet fluid and widening on the right at the same level.    CT lumbar 9/17/2024: No acute fractures or subluxations.  Mild degenerative changes throughout.    ASSESSMENT:  Mr. Hill presents with symptomatic left L5 radiculopathy due to a left paracentral disc herniation at L4-5, correlating with his left-sided leg pain, numbness, and weakness. His symptoms have progressively worsened over the past six months despite conservative measures, including physical therapy and  epidural steroid injections, which provided only minimal and transient relief. MRI imaging reveals a left paracentral disc herniation at L4-5 impinging on the traversing left L5 nerve root. Given the severity of his symptoms, failure of conservative management, and significant impact on his quality of life, surgical intervention is warranted. I recommend a minimally invasive left L4-5 microdiscectomy to decompress the affected nerve root. We discussed the risks and benefits of surgery in detail, including the high likelihood of symptom relief and the low risk of complications such as nerve injury. The patient understands that while surgery aims to alleviate his current symptoms, there is a possibility of recurrence or progression requiring future interventions. He is motivated to proceed with surgery to improve his functional status and discontinue opioid medications.    Plan:  Surgery:  Schedule minimally invasive left L4-5 microdiscectomy.    Preoperative Evaluation:  Obtain preoperative medical clearance.    Medications:  Prescribe pain management until surgery date.  Continue current medications.    Activity:  Encourage light activity as tolerated; avoid heavy lifting, bending, or twisting.    Patient Education:  Reviewed postoperative restrictions and expectations.  Discussed signs and symptoms warranting immediate medical attention.    Scheduling:  Attempt to expedite surgery date if earlier availability arises.    Follow-Up:  Arrange preoperative visit to address any further questions.    Kel Crawford MD  Neurological Surgery    [address], [address]  [place], [place] [id number] [phone]  Pager [phone]  11/25/2024 3:21 PM      This note was created using a voice-recognition transcribing system. Incorrect words or phrases may have been missed during proofreading. Please interpret accordingly.    Total Time    New Patient Total Time       60   minutes.      Activities       Preparing to see the patient (chart/tests/imaging review).       Obtaining and/or reviewing separately obtained history.       Performing a medically appropriate examination and/or evaluation.       Counseling and educating the patient/family/caregiver.       Ordering medications, tests, or procedures.       Referring and communicating with other health care professionals (when not separately reported).       Documenting clincal information in the electronic or other health record.       Independently interpreting results (not separately reported).    Communicating results to the patient/family/caregiver.    Care coordination (not separately reported).       [1] No Known Allergies  [2]   Current Outpatient Medications on File Prior to Visit   Medication Sig Dispense Refill    HYDROcodone-acetaminophen 5-325 MG Oral Tab Take 1 tablet by mouth every 8 (eight) hours as needed for Pain. 30 tablet 0    Naloxone HCl 4 MG/0.1ML Nasal Liquid 4 mg by Intranasal route as needed (May repeat as needed in other nostril if symptoms persist). If patient remains unresponsive, repeat dose in other nostril 2-5 minutes after first dose. 1 kit 0    cyclobenzaprine 10 MG Oral Tab Take 1 tablet (10 mg total) by mouth nightly as needed for Muscle spasms. 30 tablet 0    gabapentin 300 MG Oral Cap Start with night time dose only. If well tolerated, increase to two times daily. If well tolerated, increase to three times daily. 90 capsule 0    methylPREDNISolone (MEDROL) 4 MG Oral Tablet Therapy Pack Dosepack: take as directed (Patient not taking: Reported on 10/4/2024) 1 each 0    alprazolam 2 MG Oral Tab Take 1 tablet (2 mg total) by mouth As Directed.       No current facility-administered medications on file prior to visit.

## 2024-11-25 NOTE — PROGRESS NOTES
Patient has low back pain radiating down the left leg. The pain is worse with sitting and lying down.  He has had back pain for 2-3 years but \"tweaked\" it at the gym 6 months ago and it has been getting worse since and radiating down his leg. He has numbness in his toes.  His pain is a 10/10 which is not relieved with norco.  He has had injections which only slightly improved his symptoms for a week or 2.  He feels weak in the leg.

## 2024-11-25 NOTE — TELEPHONE ENCOUNTER
Patient is asking for a video visit due to his job not wanting to let him off to come to the appt today at 2 pm? Please advise

## 2024-12-02 NOTE — TELEPHONE ENCOUNTER
Patient is scheduled for MINIMALLY INVASIVE BILATERAL LUMBAR 4 TO 5 LAMINOFORAMINOTOMIES, MICRODISCECTOMY, APPROACHING FROM THE LEFT  on 1-15-25 with Dr Crawford at [facility].    Y pre-op apt scheduled (if sx is more than 30 days from last apt)  Y pre-op apt scheduled with RN spine navigator  Y Surgical instructions reviewed by nursing staff with patient  Y Surgery order signed   Y Placed sx on surgery sheet  Y Placed on outlook calendar  Y [employer] message sent to patient with sx instructions  Y Faxed pre-op clearance request to PCP Dr Zhou   N/a  Faxed letter to cardiologist  Y Post-op appointments made  Y [place] medicaid . Routed to PA team to initiate.  Y Entire Neurosurgery Checklist Completed    Clearances: PCP  PA: Medicaid  CPT Codes: 91838

## 2024-12-17 NOTE — TELEPHONE ENCOUNTER
Prior Authorization for Outpatient surgery initiated with [employer].  Requesting coverage for:MINIMALLY INVASIVE BILATERAL LUMBAR 4 TO 5 LAMINOFORAMINOTOMIES, MICRODISCECTOMY, APPROACHING FROM THE LEFT    Date of Service: 01/15/25   Inpatient days requested:Outpatient  CPT codes: 64021    Request for surgery pending review, pending reference #[id number]. Clinical notes uploaded on [employer].

## 2024-12-18 NOTE — TELEPHONE ENCOUNTER
Prior authorization request completed for: L4-5 minimally invasive discectomy, approaching from the left    Authorization #[id number]  Authorization dates: 01/15/25 (12/18/24-06/16/25)  CPT codes approved: 02176  Number of visits/dates of service approved: Outpatient  Physician: Yvette   Location: [facility]

## 2024-12-23 DIAGNOSIS — M51.26 LUMBAR DISC HERNIATION: ICD-10-CM

## 2024-12-23 RX ORDER — HYDROCODONE BITARTRATE AND ACETAMINOPHEN 5; 325 MG/1; MG/1
1 TABLET ORAL EVERY 8 HOURS PRN
Qty: 30 TABLET | Refills: 0 | Status: SHIPPED | OUTPATIENT
Start: 2024-12-23

## 2024-12-23 NOTE — TELEPHONE ENCOUNTER
Patient calling to request refill of HYDROcodone-acetaminophen 5-325 MG   Pharmacy [id number] - [place], [place] - [facility] [address] AT [facility] [address], [phone], [phone] [[id number]]   Patient is stating he is out of medication.   Patient informed of 48 hour refill policy excluding weekends and holidays.   Informed patient prescription is sent directly to pharmacy.    Further explained patient will not receive a call back once prescription is ready.     Problem: Pain:  Goal: Pain level will decrease  Description  Pain level will decrease   Outcome: Ongoing  Goal: Control of acute pain  Description  Control of acute pain   Outcome: Ongoing  Goal: Control of chronic pain  Description  Control of chronic pain   Outcome: Ongoing     Problem: Falls - Risk of:  Goal: Will remain free from falls  Description  Will remain free from falls   Outcome: Ongoing  Goal: Absence of physical injury  Description  Absence of physical injury   Outcome: Ongoing     Problem: Nutrition  Goal: Optimal nutrition therapy  Outcome: Ongoing     Problem: Confusion - Acute:  Goal: Absence of continued neurological deterioration signs and symptoms  Description  Absence of continued neurological deterioration signs and symptoms  Outcome: Ongoing  Goal: Mental status will be restored to baseline  Description  Mental status will be restored to baseline  Outcome: Ongoing     Problem: Injury - Risk of, Physical Injury:  Goal: Will remain free from falls  Description  Will remain free from falls   Outcome: Ongoing  Goal: Absence of physical injury  Description  Absence of physical injury   Outcome: Ongoing     Problem: Mood - Altered:  Goal: Mood stable  Description  Mood stable  Outcome: Ongoing  Goal: Absence of abusive behavior  Description  Absence of abusive behavior  Outcome: Ongoing  Goal: Verbalizations of feeling emotionally comfortable while being cared for will increase  Description  Verbalizations of feeling emotionally comfortable while being cared for will increase  Outcome: Ongoing     Problem: Psychomotor Activity - Altered:  Goal: Absence of psychomotor disturbance signs and symptoms  Description  Absence of psychomotor disturbance signs and symptoms  Outcome: Ongoing     Problem: Sensory Perception - Impaired:  Goal: Demonstrations of improved sensory functioning will increase  Description  Demonstrations of improved sensory functioning will increase  Outcome: Ongoing  Goal:

## 2024-12-23 NOTE — TELEPHONE ENCOUNTER
Medication:   HYDROcodone-acetaminophen 5-325 MG Oral Tab 30 tablet 0 11/25/2024 --   Sig:   Take 1 tablet by mouth every 8 (eight) hours as needed for Pain.          Date of last refill: 11.25.24 (#30/0)  [place]      Last office visit: pt having SX on 1.15.2025  Due back to clinic per last office note:    Date next office visit scheduled:  1.2.25  Future Appointments   Date Time Provider Department Center   12/31/2024  9:00 AM SPINE NAVIGATOR [facility] None   1/2/2025 11:30 AM Kel Crawford MD [facility]   2/5/2025 11:45 AM Ivan Collier PA-C [facility]   2/26/2025 11:45 AM Ivan Collier PA-C [facility]           Last OV note recommendation:    Pt having SX on 1.15.2025

## 2024-12-31 ENCOUNTER — NURSE ONLY (OUTPATIENT)
Age: 29
End: 2024-12-31
Payer: MEDICAID

## 2024-12-31 DIAGNOSIS — Z71.9 ENCOUNTER FOR EDUCATION: Primary | ICD-10-CM

## 2024-12-31 NOTE — PROGRESS NOTES
RN Spine Navigator Education for      If you have received instructions from your surgeon that are different from those listed below, please follow your physician's instructions.    You are scheduled for a Lumbar decompression with Dr. Crawford on 1/15.      Patient Surgical Goals: Decreased leg pain    Before Your Surgery    Choose a Care Partner  Patient attended spine navigator visit independently.  Care partner is Mom or brother. Your care partner(s) should be able to provide transportation to and from the hospital. They should be able to help at home for the first week after discharge, including helping you with meals, medication, and dressing changes. It is strongly recommended that someone stays with you for 24 hours after anesthesia if going home the day of surgery.    Clearance Before Surgery  You will need to see your primary care provider within 30 days before surgery. Please make sure this appointment is at least a week before surgery as more testing or doctor visits may be ordered. Presurgical testing may include labs, nasal swab, imaging, EKG.   Make sure that you complete all preadmission testing so that surgery does not get delayed.    Home Environment  Assessed home status: bedroom and bathroom are on first floor and patient has pets. Suggested arrangements for pet care.  It is recommended that you prepare your home by putting clean sheets on your bed, freezing meals, and putting frequently used items at waist level.   Prevent falls by removing items that could cause you to trip, adding nightlights and adding a nonskid mat in shower.   Assistive devices can be purchased at a medical supply store or online including canes, walkers, toileting devices (commodes, raised toilet seats, toilet paper wiping aid), long handled sponge, shower chair or tub transfer bench, grabber/reacher tool, sock aid, long handled shoehorn, if needed.      Tobacco, Nicotine and Marijuana Use   No marijuana products for  24 hours before anesthesia or while taking narcotic medications    Medications to Stop   For 7-10 days before surgery do not take any blood thinning medications. This includes non-steroidal anti-inflammatories or NSAIDs (Advil, Aleve, Motrin, ibuprofen, naproxen, meloxicam, diclofenac, celecoxib, etc.), aspirin (unless told otherwise by your cardiologist or surgeon), herbal supplements and vitamins (garlic, turmeric, vitamin E, fish oil or krill oil, etc.). You may only take Tylenol or prescribed narcotic medication if needed for pain.   Other medications to stop include: none    Leading Up to Day of Surgery  Five days before surgery wash with Hibiclens soap after your regular body soap every day. Do not put use Hibiclens on your face, hair or privates. Your last shower should be the night before surgery. and use mupirocin (Bactroban) if prescribed.   One business day before surgery, the preadmission testing staff will call you and let you know what time to arrive, where to park and will provide any additional instructions.   After 11pm the day before surgery, do not eat or drink anything (including water, gum, or candies) except for Tylenol and Gatorade.   Drink 12 ounces of regular Gatorade (NOT RED) 12 hours prior to your scheduled surgery time. Drink your second 12 ounces of regular Gatorade and take 1000 mg of Tylenol (acetaminophen) 4 hours before your scheduled surgery time.     Items to Bring to the Hospital   Bring insurance card, ID, advanced directive, or medical power of  paperwork, loose fitting clothing, shoes with a back that can accommodate swollen feet, long phone charging cord.  Do not bring jewelry, valuables, or medications.     In the Hospital     Operative Day and Hospital Stay  In the preoperative area, you will change into a gown, have an IV placed in your hand or arm by the nurse, and sign any consent paperwork that is needed for your procedure. You will speak to the surgeon and  anesthesiologist. It is important to tell the doctors and nurses if you have had any significant side effects from pain medications or anesthesia such as a rash or severe nausea.    In the operating room, the anesthesiologist will attach monitors, give you oxygen through a mask, and give you medicine through the IV to fall asleep. After you are sleeping, the breathing tube will be placed. The equipment will be hooked up and removed while you are asleep. You will wake up on the stretcher.      During the surgery, your care partner can sit in the surgical waiting area. There are TV screens in that area that keep them informed of your progress.     In the recovery room, monitors will be attached that check your heart rate, blood pressure and oxygen levels. While you may not remember this part, a nurse is with you and constantly checking on your condition. Medications for pain and nausea will be given if you need them. Your family is not allowed in the recovery room. When you are ready to leave the recovery room, you will go to the same day surgery discharge area. Your family may join you there as you continue to wake up. You will be offered something to eat and drink and be given pain pills if needed. You will get your discharge instructions from the nurse and go home from there.  Preventing surgical complications  It is important to follow all instructions before and after surgery to decrease the risks of surgery and prevent complications.     Blood clots: walk and do ankle pumps at home  Infection: wash with Hibiclens before surgery, wash your hands, don't touch your incision  Pneumonia: take deep breaths and cough  Nausea/vomiting: start with liquids and small meals and do not take pills on an empty stomach  Constipation: drink water and walk frequently    Tell your nurse if you are experiencing nausea or vomiting as they have medications to help treat these.      At home     Understanding Pain After Surgery  We  will do our best to manage your pain after surgery, but it is not possible to be completely pain-free. There will be operative pain in your back or neck. Pain in the arms or legs may be one of the first things to improve. Numbness, weakness, and tingling should improve over time. However, there can be a temporary increase in symptoms in the first few days due to inflammation from surgery.   Pain medications will be prescribed to take home at discharge. The goal of pain medicine after surgery is to make your pain tolerable, not to make you pain free. We encourage you to use the medication prescribed to you after your surgery, but please take the lowest possible dose to manage your pain. Taking high doses of narcotics can cause side effects. Transition to plain Tylenol when your pain improves. You may get more continuous pain relief by alternating between medications if you have multiple instead of taking them at the same time. Write down when you have taken a medication as it may be difficult to remember after a few doses.    Post operative medication   Tylenol (acetaminophen): take for pain. Do not take more than 4000 mg in 24 hours because it can damage your liver.   Narcotics: take for moderate to severe pain. Do not drink alcohol or drive while taking narcotics. Some narcotic medications (Norco, Tylenol #3, Percocet, Vicodin) contain Tylenol (acetaminophen). Make sure to not exceed the maximum dose if you are taking additional Tylenol with these medications.  Muscle relaxers: take for muscle cramping. These can cause drowsiness.    NSAIDS (Advil, Aleve, Motrin, ibuprofen, naproxen, meloxicam, diclofenac, celecoxib, etc.) or aspirin: Do not take these unless your physician says it is ok  Stool softeners: take to prevent constipation while you are taking narcotic medications. You can get these over the counter at the pharmacy. You may use laxatives, which are stronger, if needed.    If you believe you will need  more of medication your surgeon has prescribed to you, request a refill through your pharmacy or through the refill request in PostedIn (log in, go to medications, then select refill request) at least two business days before you run out of medication.     Nonpharmacologic pain management   You may use ice on your incision for 20 minutes every hour to help with pain and swelling. Do not place ice directly on your skin. You can use heat to sooth your muscles but avoid placing heat directly on your incision. Make frequent position changes. You can do gentle stretching while avoiding significant bending or twisting. Use deep breathing techniques and distractions such as TV, music, reading, or games.     Movement restrictions  After surgery, no bending or twisting your neck or back. Do not lift anything over 10lbs (about the weight of a gallon of milk) or lift anything over your shoulders. Avoid pulling or pushing. You may use stairs while holding the handrail.  It is ok to ride in the car but refrain from driving or traveling long distances until cleared to do so by your surgeon. You may not drive while taking narcotics or muscle relaxants.    Post Op Exercise   Walk frequently. Start with walking short distances and increase as you start to feel better. Do ankle pumps (bending at your ankles, bring your feet towards your head then point them towards the ground) 15-20 times every hour when awake to help prevent blood clots.     Your surgeon will let you know at your post operative appointment if you are ready to decrease your movement restrictions and increase your exercise. If you have questions in between appointments about lessening your restrictions, please contact the office.     You and your doctor will discuss how you are feeling as you heal and decide if outpatient physical therapy or a medical fitness referral is needed.    Caring for your incision  Always wash your hands before touching your incision. Your  incision will be closed with sutures under the skin and skin glue or Steri-Strips (thin white bandages) on top of the skin. Do not attempt to peal off Skin glue/Steri-Strips as they will come off on their own. If the incision is closed with sutures or staples on top of the skin, they will be removed at a post op appointment. The incision may be covered with a gauze dressing that can come off after three days. Once the original gauze dressing is removed, we prefer that you leave your incision open to air (without a gauze dressing). If the incision has drainage or is rubbing against your clothes, you may place gauze and medical tape over it. Change the gauze and medical tape daily. Look at your incision daily to check for signs of infection.  You can shower three days after your surgery or sooner if your surgeon allows. We recommend the care partner be present during the first shower for safety. Let soapy water run over the incision, but do not scrub it or spray it directly. Gently pat it dry after with a clean towel. Do not apply any creams or lotions to the incision. Do not soak in a tub, pool, or any body of water until your incision is fully healed.    Signs of Infection   Check your incision daily for swelling, redness, drainage, pus, bad smell, or opening of the incision.     When to Call for Assistance  Call the Neurosurgery Office ([phone] Option #2) if you experience signs of infection, opening of the incision, continuous nausea or vomiting, poor pain control despite using the pain medication as directed, a sudden increase in pain, temperature over 101F, difficulty swallowing, leg swelling, or with any concerns, unanswered questions, or new problems, such as new numbness/weakness/tingling.  Call 911 or go to the nearest emergency room if you experience chest pain, difficulty breathing, loss of bowel or bladder control, extreme drowsiness, or any other life-threatening situation.     Follow-up Plan    Appointments with Dr. Crawford or Ivan at 2-3, 4-6 and 12 weeks    Answered questions regarding: Please call PCP office today for appointment and get labs when you see them ordered in your MyChart.      You can contact the RN Spine Navigator at [phone] or [email or web address] with additional questions or feedback on your care. It may take several business days to receive a reply so please do not call the RN spine navigator for refills or for emergencies.    Spine navigator spent 36 minutes conducting a virtual visit to provide education. Thank you for letting the RN Spine Navigator participate in your care.

## 2025-01-02 ENCOUNTER — LAB ENCOUNTER (OUTPATIENT)
Dept: LAB | Facility: HOSPITAL | Age: 30
End: 2025-01-02
Attending: STUDENT IN AN ORGANIZED HEALTH CARE EDUCATION/TRAINING PROGRAM
Payer: MEDICAID

## 2025-01-02 ENCOUNTER — OFFICE VISIT (OUTPATIENT)
Dept: SURGERY | Facility: CLINIC | Age: 30
End: 2025-01-02
Payer: MEDICAID

## 2025-01-02 DIAGNOSIS — M51.16 LUMBAR DISC HERNIATION WITH RADICULOPATHY: ICD-10-CM

## 2025-01-02 DIAGNOSIS — M47.816 LUMBAR SPONDYLOSIS: ICD-10-CM

## 2025-01-02 DIAGNOSIS — M51.26 LUMBAR DISC HERNIATION: ICD-10-CM

## 2025-01-02 DIAGNOSIS — M54.16 LUMBAR RADICULOPATHY: ICD-10-CM

## 2025-01-02 DIAGNOSIS — M51.362 DEGENERATION OF INTERVERTEBRAL DISC OF LUMBAR REGION WITH DISCOGENIC BACK PAIN AND LOWER EXTREMITY PAIN: ICD-10-CM

## 2025-01-02 DIAGNOSIS — M51.362 DEGENERATION OF INTERVERTEBRAL DISC OF LUMBAR REGION WITH DISCOGENIC BACK PAIN AND LOWER EXTREMITY PAIN: Primary | ICD-10-CM

## 2025-01-02 DIAGNOSIS — M48.061 STENOSIS OF LATERAL RECESS OF LUMBAR SPINE: ICD-10-CM

## 2025-01-02 LAB
ALBUMIN SERPL-MCNC: 4.7 G/DL (ref 3.2–4.8)
ALBUMIN/GLOB SERPL: 2 {RATIO} (ref 1–2)
ALP LIVER SERPL-CCNC: 50 U/L
ALT SERPL-CCNC: 35 U/L
ANION GAP SERPL CALC-SCNC: 7 MMOL/L (ref 0–18)
ANTIBODY SCREEN: NEGATIVE
APTT PPP: 27.6 SECONDS (ref 23–36)
AST SERPL-CCNC: 27 U/L (ref ?–34)
ATRIAL RATE: 80 BPM
BASOPHILS # BLD AUTO: 0.02 X10(3) UL (ref 0–0.2)
BASOPHILS NFR BLD AUTO: 0.3 %
BILIRUB SERPL-MCNC: 0.5 MG/DL (ref 0.3–1.2)
BUN BLD-MCNC: 15 MG/DL (ref 9–23)
BUN/CREAT SERPL: 13 (ref 10–20)
CALCIUM BLD-MCNC: 9.9 MG/DL (ref 8.7–10.4)
CHLORIDE SERPL-SCNC: 107 MMOL/L (ref 98–112)
CO2 SERPL-SCNC: 26 MMOL/L (ref 21–32)
CREAT BLD-MCNC: 1.15 MG/DL
DEPRECATED RDW RBC AUTO: 39.2 FL (ref 35.1–46.3)
EGFRCR SERPLBLD CKD-EPI 2021: 89 ML/MIN/1.73M2 (ref 60–?)
EOSINOPHIL # BLD AUTO: 0.09 X10(3) UL (ref 0–0.7)
EOSINOPHIL NFR BLD AUTO: 1.2 %
ERYTHROCYTE [DISTWIDTH] IN BLOOD BY AUTOMATED COUNT: 12.3 % (ref 11–15)
FASTING STATUS PATIENT QL REPORTED: YES
GLOBULIN PLAS-MCNC: 2.3 G/DL (ref 2–3.5)
GLUCOSE BLD-MCNC: 84 MG/DL (ref 70–99)
HCT VFR BLD AUTO: 42.9 %
HGB BLD-MCNC: 15.8 G/DL
IMM GRANULOCYTES # BLD AUTO: 0.02 X10(3) UL (ref 0–1)
IMM GRANULOCYTES NFR BLD: 0.3 %
INR BLD: 0.93 (ref 0.8–1.2)
LYMPHOCYTES # BLD AUTO: 2.83 X10(3) UL (ref 1–4)
LYMPHOCYTES NFR BLD AUTO: 37.7 %
MCH RBC QN AUTO: 32.1 PG (ref 26–34)
MCHC RBC AUTO-ENTMCNC: 36.8 G/DL (ref 31–37)
MCV RBC AUTO: 87.2 FL
MONOCYTES # BLD AUTO: 0.49 X10(3) UL (ref 0.1–1)
MONOCYTES NFR BLD AUTO: 6.5 %
NEUTROPHILS # BLD AUTO: 4.06 X10 (3) UL (ref 1.5–7.7)
NEUTROPHILS # BLD AUTO: 4.06 X10(3) UL (ref 1.5–7.7)
NEUTROPHILS NFR BLD AUTO: 54 %
OSMOLALITY SERPL CALC.SUM OF ELEC: 290 MOSM/KG (ref 275–295)
P AXIS: 54 DEGREES
P-R INTERVAL: 174 MS
PLATELET # BLD AUTO: 253 10(3)UL (ref 150–450)
POTASSIUM SERPL-SCNC: 4.2 MMOL/L (ref 3.5–5.1)
PROT SERPL-MCNC: 7 G/DL (ref 5.7–8.2)
PROTHROMBIN TIME: 13 SECONDS (ref 11.6–14.8)
Q-T INTERVAL: 368 MS
QRS DURATION: 104 MS
QTC CALCULATION (BEZET): 424 MS
R AXIS: -8 DEGREES
RBC # BLD AUTO: 4.92 X10(6)UL
RH BLOOD TYPE: POSITIVE
SODIUM SERPL-SCNC: 140 MMOL/L (ref 136–145)
T AXIS: 33 DEGREES
VENTRICULAR RATE: 80 BPM
WBC # BLD AUTO: 7.5 X10(3) UL (ref 4–11)

## 2025-01-02 PROCEDURE — 86850 RBC ANTIBODY SCREEN: CPT

## 2025-01-02 PROCEDURE — 99214 OFFICE O/P EST MOD 30 MIN: CPT | Performed by: STUDENT IN AN ORGANIZED HEALTH CARE EDUCATION/TRAINING PROGRAM

## 2025-01-02 PROCEDURE — 86901 BLOOD TYPING SEROLOGIC RH(D): CPT

## 2025-01-02 PROCEDURE — 93005 ELECTROCARDIOGRAM TRACING: CPT

## 2025-01-02 PROCEDURE — 86900 BLOOD TYPING SEROLOGIC ABO: CPT

## 2025-01-02 PROCEDURE — 93010 ELECTROCARDIOGRAM REPORT: CPT | Performed by: INTERNAL MEDICINE

## 2025-01-02 PROCEDURE — 85730 THROMBOPLASTIN TIME PARTIAL: CPT | Performed by: STUDENT IN AN ORGANIZED HEALTH CARE EDUCATION/TRAINING PROGRAM

## 2025-01-02 PROCEDURE — 85025 COMPLETE CBC W/AUTO DIFF WBC: CPT | Performed by: STUDENT IN AN ORGANIZED HEALTH CARE EDUCATION/TRAINING PROGRAM

## 2025-01-02 PROCEDURE — 80053 COMPREHEN METABOLIC PANEL: CPT | Performed by: STUDENT IN AN ORGANIZED HEALTH CARE EDUCATION/TRAINING PROGRAM

## 2025-01-02 PROCEDURE — 85610 PROTHROMBIN TIME: CPT | Performed by: STUDENT IN AN ORGANIZED HEALTH CARE EDUCATION/TRAINING PROGRAM

## 2025-01-02 PROCEDURE — 87081 CULTURE SCREEN ONLY: CPT

## 2025-01-02 PROCEDURE — 36415 COLL VENOUS BLD VENIPUNCTURE: CPT

## 2025-01-02 NOTE — H&P (VIEW-ONLY)
[facility]  Neurological Surgery Established Clinic Note    Samuel Hill  5/13/1995  [id number]  PCP: Leo Zhou MD  Referring Provider: Kyaw Schneider MD    REASON FOR VISIT:  Lumbar disc herniation    HISTORY OF PRESENT ILLNESS 11/25/2024:  Samuel Hill is a(n) 29 year old male with a history of low back pain radiating down the left leg, worsening over the past six months following a gym injury. He reports persistent severe pain, described as shooting from his left hamstring to his knee and wrapping around the left side of his leg, sometimes feeling like a high ankle sprain. He also experiences numbness in his toes and weakness in the leg. The pain is rated at 10/10 and is not relieved with hydrocodone (Norco), which he takes to manage pain and continue working. He notes that the pain is worse with sitting and lying down and slightly improves with activity. Previous epidural steroid injections provided only minimal and transient relief. He expresses a desire to proceed with surgical intervention to alleviate his symptoms and reduce dependence on medications.    INTERVAL HISTORY 1/2/2025:  Mr. Hill returns today for continued preoperative discussions in anticipation of his scheduled minimally invasive L4-5 microdiscectomy on 1/15/2025. He reports persistent low back pain radiating down the left leg, unchanged since his last visit, and describes ongoing discomfort despite his current pain regimen (acetaminophen). He is notably concerned about gastrointestinal side effects related to pain medications due to a history of stomach ulcers. He remains motivated for surgical intervention and plans to obtain formal medical clearance from his primary care provider (PCP) immediately following this visit. No new neurological deficits or red-flag symptoms are reported.    PAST MEDICAL HISTORY:  Past Medical History:    Anxiety    Back pain    Back problem     Depression    History of stomach ulcers     PAST SURGICAL HISTORY:  Past Surgical History:   Procedure Laterality Date    Adenoidectomy      Appendectomy  03/2023    Create eardrum opening,gen anesth      Other      ear tubes    Removal adenoids,primary,12+ y/o      Removal of tonsils,12+ y/o      and ademoids    Tonsillectomy       FAMILY HISTORY:  family history includes Arthritis in his mother; Diabetes in his maternal grandfather and paternal grandmother; Heart Disorder in his maternal grandfather and maternal grandmother.    SOCIAL HISTORY:   reports that he has never smoked. He has been exposed to tobacco smoke. He has never used smokeless tobacco. He reports current alcohol use. He reports current drug use. Frequency: 70.00 times per week. Drug: Cannabis.    ALLERGIES:  Allergies[1]    MEDICATIONS:  Medications Ordered Prior to Encounter[2]    REVIEW OF SYSTEMS:  All other systems were reviewed and were negative except for those previously mentioned in the HPI    PHYSICAL EXAMINATION:  General: No acute distress.  Respiratory: Non-labored respirations bilaterally. No audible wheezing  Cardiovascular: Extremities warm and well-perfused.  Abdomen: Soft, nontender, nondistended.   Musculoskeletal: Moves all extremities well, symmetrically.  Extremities: No edema.    NEUROLOGIC EXAMINATION:  Mental status: Alert and oriented x 3  Speech: Clear, fluent  Cranial nerves: PERRLA, EOMI, face symmetric, with normal strength and sensation, tongue and palate midline, SCM 5/5 bilaterally  Motor:     RIGHT  Delt 5/5   Bic 5/5  Tri 5/5   HI 5/5    5/5  IP 5/5   Quad 5/5   Ham 5/5   AT 5/5   EHL 5/5 Pamela 5/5     LEFT    Delt 5/5   Bic 5/5  Tri 5/5   HI 5/5    5/5  IP 5/5   Quad 5/5   Ham 5/5   AT 5/5   EHL 5/5 Pamela 5/5   No pronator drift  Tone: Normal  Atrophy/Fasciculations: None  Sensation: Normal to light touch, symmetric, no neglect  Cerebellar: Normal finger nose finger  Gait: Normal, nondistressed heel toe  tandem gait      Reflexes: 2+ throughout, symmetric, no Mor's    IMAGING:  MR lumbar 9/20/2024: Right paracentral L4-5 disc herniation leading to right lateral recess stenosis and encroachment on the traversing L5 nerve root.  There is facet fluid and widening on the right at the same level.    CT lumbar 9/17/2024: No acute fractures or subluxations.  Mild degenerative changes throughout.    ASSESSMENT:  Mr. Hill continues to exhibit classic signs of left L4-5 disc herniation and associated radiculopathy. Conservative measures have failed to provide meaningful or lasting relief, and he reports worsening radicular pain. Despite his gastrointestinal sensitivities to analgesics, his condition remains best managed surgically at this stage. He is scheduled for a minimally invasive left L4-5 microdiscectomy on 1/15/2025. Preoperative labs and final medical clearance are to be completed prior to surgery. His overall medical status remains stable, with no new contraindications for proceeding.     PLAN:    - Proceed with minimally invasive left L4-5 microdiscectomy on 1/15/2025.      - Obtain final surgical clearance from PCP    - Complete the required laboratory tests (CBC with differential, CMP, PT, aPTT, type and screen, etc.) on the first floor lab today if possible.      - Obtain an updated EKG if indicated by clearance.    - Continue current acetaminophen regimen.    - Advised daily proton pump inhibitor (e.g., omeprazole or esomeprazole) and/or antacids (Tums) to mitigate GI side effects.      - Refrain from introducing higher-potency opioids preoperatively to preserve postoperative analgesic options.    - Avoid heavy lifting, bending, or twisting until surgery.      - Maintain light walking or gentle activity to promote circulation.      - Monitor for any new neurological or GI-related symptoms.    Kel Crawford MD  Neurological Surgery    [facility]  [address], [address]  [place] [address]  [phone]  Pager [phone]  1/2/2025 12:04 PM      This note was created using a voice-recognition transcribing system. Incorrect words or phrases may have been missed during proofreading. Please interpret accordingly.      Total Time    Established Patient Total Time       30  minutes.      Activities       Preparing to see the patient (chart/tests/imaging review).       Obtaining and/or reviewing separately obtained history.       Performing a medically appropriate examination and/or evaluation.       Counseling and educating the patient/family/caregiver.       Ordering medications, tests, or procedures.       Referring and communicating with other health care professionals (when not separately reported).       Documenting clincal information in the electronic or other health record.       Independently interpreting results (not separately reported).    Communicating results to the patient/family/caregiver.    Care coordination (not separately reported).       [1] No Known Allergies  [2]   Current Outpatient Medications on File Prior to Visit   Medication Sig Dispense Refill    HYDROcodone-acetaminophen 5-325 MG Oral Tab Take 1 tablet by mouth every 8 (eight) hours as needed for Pain. 30 tablet 0    methylPREDNISolone (MEDROL) 4 MG Oral Tablet Therapy Pack Take as directed 21 tablet 0    Naloxone HCl 4 MG/0.1ML Nasal Liquid 4 mg by Intranasal route as needed (May repeat as needed in other nostril if symptoms persist). If patient remains unresponsive, repeat dose in other nostril 2-5 minutes after first dose. 1 kit 0    cyclobenzaprine 10 MG Oral Tab Take 1 tablet (10 mg total) by mouth nightly as needed for Muscle spasms. 30 tablet 0    gabapentin 300 MG Oral Cap Start with night time dose only. If well tolerated, increase to two times daily. If well tolerated, increase to three times daily. (Patient not taking: Reported on 11/25/2024) 90 capsule 0    alprazolam 2 MG  Oral Tab Take 1 tablet (2 mg total) by mouth As Directed.       No current facility-administered medications on file prior to visit.

## 2025-01-02 NOTE — PATIENT INSTRUCTIONS
Refill policies:    Allow 2-3 business days for refills; controlled substances may take longer.  Contact your pharmacy at least 5 days prior to running out of medication and have them send an electronic request or submit request through the “request refill” option in your [employer] account.  Refills are not addressed on weekends; covering physicians do not authorize routine medications on weekends.  No narcotics or controlled substances are refilled after noon on Fridays or by on call physicians.  By law, narcotics must be electronically prescribed.  A 30 day supply with no refills is the maximum allowed.  If your prescription is due for a refill, you may be due for a follow up appointment.  To best provide you care, patients receiving routine medications need to be seen at least once a year.  Patients receiving narcotic/controlled substance medications need to be seen at least once every 3 months.  In the event that your preferred pharmacy does not have the requested medication in stock (e.g. Backordered), it is your responsibility to find another pharmacy that has the requested medication available.  We will gladly send a new prescription to that pharmacy at your request.    Scheduling Tests:    If your physician has ordered radiology tests such as MRI or CT scans, please contact Central Scheduling at [phone] right away to schedule the test.  Once scheduled, the [facility] Centralized Referral Team will work with your insurance carrier to obtain pre-certification or prior authorization.  Depending on your insurance carrier, approval may take 3-10 days.  It is highly recommended patients assure they have received an authorization before having a test performed.  If test is done without insurance authorization, patient may be responsible for the entire amount billed.      Precertification and Prior Authorizations:  If your physician has recommended that you have a procedure or additional testing performed the [facility]  Centralized Referral Team will contact your insurance carrier to obtain pre-certification or prior authorization.    You are strongly encouraged to contact your insurance carrier to verify that your procedure/test has been approved and is a COVERED benefit.  Although the [facility] Centralized Referral Team does its due diligence, the insurance carrier gives the disclaimer that \"Although the procedure is authorized, this does not guarantee payment.\"    Ultimately the patient is responsible for payment.   Thank you for your understanding in this matter.  Paperwork Completion:  If you require FMLA or disability paperwork for your recovery, please make sure to either drop it off or have it faxed to our office at [phone]. Be sure the form has your name and date of birth on it.  The form will be faxed to our Forms Department and they will complete it for you.  There is a 25$ fee for all forms that need to be filled out.  Please be aware there is a 10-14 day turnaround time.  You will need to sign a release of information (ANA) form if your paperwork does not come with one.  You may call the Forms Department with any questions at [phone].  Their fax number is [phone].

## 2025-01-02 NOTE — PROGRESS NOTES
[facility]  Neurological Surgery Established Clinic Note    Samuel Hill  5/13/1995  [id number]  PCP: Leo Zhou MD  Referring Provider: Kyaw Schneider MD    REASON FOR VISIT:  Lumbar disc herniation    HISTORY OF PRESENT ILLNESS 11/25/2024:  Samuel Hill is a(n) 29 year old male with a history of low back pain radiating down the left leg, worsening over the past six months following a gym injury. He reports persistent severe pain, described as shooting from his left hamstring to his knee and wrapping around the left side of his leg, sometimes feeling like a high ankle sprain. He also experiences numbness in his toes and weakness in the leg. The pain is rated at 10/10 and is not relieved with hydrocodone (Norco), which he takes to manage pain and continue working. He notes that the pain is worse with sitting and lying down and slightly improves with activity. Previous epidural steroid injections provided only minimal and transient relief. He expresses a desire to proceed with surgical intervention to alleviate his symptoms and reduce dependence on medications.    INTERVAL HISTORY 1/2/2025:  Mr. Hill returns today for continued preoperative discussions in anticipation of his scheduled minimally invasive L4-5 microdiscectomy on 1/15/2025. He reports persistent low back pain radiating down the left leg, unchanged since his last visit, and describes ongoing discomfort despite his current pain regimen (acetaminophen). He is notably concerned about gastrointestinal side effects related to pain medications due to a history of stomach ulcers. He remains motivated for surgical intervention and plans to obtain formal medical clearance from his primary care provider (PCP) immediately following this visit. No new neurological deficits or red-flag symptoms are reported.    PAST MEDICAL HISTORY:  Past Medical History:    Anxiety    Back pain    Back problem     Depression    History of stomach ulcers     PAST SURGICAL HISTORY:  Past Surgical History:   Procedure Laterality Date    Adenoidectomy      Appendectomy  03/2023    Create eardrum opening,gen anesth      Other      ear tubes    Removal adenoids,primary,12+ y/o      Removal of tonsils,12+ y/o      and ademoids    Tonsillectomy       FAMILY HISTORY:  family history includes Arthritis in his mother; Diabetes in his maternal grandfather and paternal grandmother; Heart Disorder in his maternal grandfather and maternal grandmother.    SOCIAL HISTORY:   reports that he has never smoked. He has been exposed to tobacco smoke. He has never used smokeless tobacco. He reports current alcohol use. He reports current drug use. Frequency: 70.00 times per week. Drug: Cannabis.    ALLERGIES:  Allergies[1]    MEDICATIONS:  Medications Ordered Prior to Encounter[2]    REVIEW OF SYSTEMS:  All other systems were reviewed and were negative except for those previously mentioned in the HPI    PHYSICAL EXAMINATION:  General: No acute distress.  Respiratory: Non-labored respirations bilaterally. No audible wheezing  Cardiovascular: Extremities warm and well-perfused.  Abdomen: Soft, nontender, nondistended.   Musculoskeletal: Moves all extremities well, symmetrically.  Extremities: No edema.    NEUROLOGIC EXAMINATION:  Mental status: Alert and oriented x 3  Speech: Clear, fluent  Cranial nerves: PERRLA, EOMI, face symmetric, with normal strength and sensation, tongue and palate midline, SCM 5/5 bilaterally  Motor:     RIGHT  Delt 5/5   Bic 5/5  Tri 5/5   HI 5/5    5/5  IP 5/5   Quad 5/5   Ham 5/5   AT 5/5   EHL 5/5 Pamela 5/5     LEFT    Delt 5/5   Bic 5/5  Tri 5/5   HI 5/5    5/5  IP 5/5   Quad 5/5   Ham 5/5   AT 5/5   EHL 5/5 Pamela 5/5   No pronator drift  Tone: Normal  Atrophy/Fasciculations: None  Sensation: Normal to light touch, symmetric, no neglect  Cerebellar: Normal finger nose finger  Gait: Normal, nondistressed heel toe  tandem gait      Reflexes: 2+ throughout, symmetric, no Mor's    IMAGING:  MR lumbar 9/20/2024: Right paracentral L4-5 disc herniation leading to right lateral recess stenosis and encroachment on the traversing L5 nerve root.  There is facet fluid and widening on the right at the same level.    CT lumbar 9/17/2024: No acute fractures or subluxations.  Mild degenerative changes throughout.    ASSESSMENT:  Mr. Hill continues to exhibit classic signs of left L4-5 disc herniation and associated radiculopathy. Conservative measures have failed to provide meaningful or lasting relief, and he reports worsening radicular pain. Despite his gastrointestinal sensitivities to analgesics, his condition remains best managed surgically at this stage. He is scheduled for a minimally invasive left L4-5 microdiscectomy on 1/15/2025. Preoperative labs and final medical clearance are to be completed prior to surgery. His overall medical status remains stable, with no new contraindications for proceeding.     PLAN:    - Proceed with minimally invasive left L4-5 microdiscectomy on 1/15/2025.      - Obtain final surgical clearance from PCP    - Complete the required laboratory tests (CBC with differential, CMP, PT, aPTT, type and screen, etc.) on the first floor lab today if possible.      - Obtain an updated EKG if indicated by clearance.    - Continue current acetaminophen regimen.    - Advised daily proton pump inhibitor (e.g., omeprazole or esomeprazole) and/or antacids (Tums) to mitigate GI side effects.      - Refrain from introducing higher-potency opioids preoperatively to preserve postoperative analgesic options.    - Avoid heavy lifting, bending, or twisting until surgery.      - Maintain light walking or gentle activity to promote circulation.      - Monitor for any new neurological or GI-related symptoms.    Kel Crawford MD  Neurological Surgery    [facility]  [address], [address]  [place] [address]  [phone]  Pager [phone]  1/2/2025 12:04 PM      This note was created using a voice-recognition transcribing system. Incorrect words or phrases may have been missed during proofreading. Please interpret accordingly.      Total Time    Established Patient Total Time       30  minutes.      Activities       Preparing to see the patient (chart/tests/imaging review).       Obtaining and/or reviewing separately obtained history.       Performing a medically appropriate examination and/or evaluation.       Counseling and educating the patient/family/caregiver.       Ordering medications, tests, or procedures.       Referring and communicating with other health care professionals (when not separately reported).       Documenting clincal information in the electronic or other health record.       Independently interpreting results (not separately reported).    Communicating results to the patient/family/caregiver.    Care coordination (not separately reported).       [1] No Known Allergies  [2]   Current Outpatient Medications on File Prior to Visit   Medication Sig Dispense Refill    HYDROcodone-acetaminophen 5-325 MG Oral Tab Take 1 tablet by mouth every 8 (eight) hours as needed for Pain. 30 tablet 0    methylPREDNISolone (MEDROL) 4 MG Oral Tablet Therapy Pack Take as directed 21 tablet 0    Naloxone HCl 4 MG/0.1ML Nasal Liquid 4 mg by Intranasal route as needed (May repeat as needed in other nostril if symptoms persist). If patient remains unresponsive, repeat dose in other nostril 2-5 minutes after first dose. 1 kit 0    cyclobenzaprine 10 MG Oral Tab Take 1 tablet (10 mg total) by mouth nightly as needed for Muscle spasms. 30 tablet 0    gabapentin 300 MG Oral Cap Start with night time dose only. If well tolerated, increase to two times daily. If well tolerated, increase to three times daily. (Patient not taking: Reported on 11/25/2024) 90 capsule 0    alprazolam 2 MG  Oral Tab Take 1 tablet (2 mg total) by mouth As Directed.       No current facility-administered medications on file prior to visit.

## 2025-01-08 NOTE — TELEPHONE ENCOUNTER
Per Dr. Zhou's OV note from 1/2/25, \"Pt is medically optimized for surgery.\"     Nothing further needed.

## 2025-01-09 ENCOUNTER — TELEPHONE (OUTPATIENT)
Dept: SURGERY | Facility: CLINIC | Age: 30
End: 2025-01-09

## 2025-01-09 DIAGNOSIS — M51.26 LUMBAR DISC HERNIATION: ICD-10-CM

## 2025-01-09 RX ORDER — HYDROCODONE BITARTRATE AND ACETAMINOPHEN 5; 325 MG/1; MG/1
1 TABLET ORAL EVERY 8 HOURS PRN
Qty: 18 TABLET | Refills: 0 | Status: SHIPPED | OUTPATIENT
Start: 2025-01-09 | End: 2025-01-15

## 2025-01-09 NOTE — TELEPHONE ENCOUNTER
Patient calling to request refill of HYDROcodone-acetaminophen 5-325 MG Oral Tab   Pharmacy   [id number] - [place], [place] - [facility] [address] AT [facility] [address], [phone], [phone]       Patient informed of 48 hour refill policy excluding weekends and holidays.   Informed patient prescription is sent directly to pharmacy.    Further explained patient will not receive a call back once prescription is ready.

## 2025-01-09 NOTE — TELEPHONE ENCOUNTER
Patient scheduled for surgery 1/15/25 with Dr. Crawford    Per last office visit on 1/2/25    \"PLAN:    - Proceed with minimally invasive left L4-5 microdiscectomy on 1/15/2025.      - Obtain final surgical clearance from PCP    - Complete the required laboratory tests (CBC with differential, CMP, PT, aPTT, type and screen, etc.) on the first floor lab today if possible.      - Obtain an updated EKG if indicated by clearance.    - Continue current acetaminophen regimen.    - Advised daily proton pump inhibitor (e.g., omeprazole or esomeprazole) and/or antacids (Tums) to mitigate GI side effects.      - Refrain from introducing higher-potency opioids preoperatively to preserve postoperative analgesic options.    - Avoid heavy lifting, bending, or twisting until surgery.      - Maintain light walking or gentle activity to promote circulation.      - Monitor for any new neurological or GI-related symptoms.\"    Script last filled by Hannah on 12/23/24 30 tabs  Routed to provider for input

## 2025-01-10 RX ORDER — MULTIVITAMIN
1 TABLET ORAL DAILY
COMMUNITY

## 2025-01-14 NOTE — DISCHARGE INSTRUCTIONS
Minimally Invasive Lumbar Microdiscectomy/Laminectomy Discharge Instructions  (Dr. Crawford)    Activity  Restrictions  No twisting, pulling, pushing or lifting > 10 lbs for the first month,  > 20 lbs for the second month, > 30 lbs for the third month.  No lifting anything over your head.    Sitting  Sit with your knees at about the same level as your hips; use a footstool if needed.  Do not sit in soft or overstuffed chairs. Firm chairs with straight backs give better support.   Recliners are OK but may need to add pillows in order to not sink into the chair.  Use a raised toilet seat if needed.  Change position every 20-30 minutes when sitting (example: after sitting, stand, then you can sit again for another 20-30 minutes).    Walking is your best exercise.  Listen to your body.  Walk several times a day  Smaller distances, but frequently are better.  Your goal is to increase the distance you walk each day.  Remember to listen to your body    Sex  After 2 weeks, when comfortable and approved by your surgeon.  Stop if causing pain.    Driving  Usually allowed after 1-2 weeks; so long as you're not under the influence of narcotics (check with physician at first office visit).  Do Not drive while taking narcotics or muscle relaxants.  Adhere to sitting restrictions.    Stairs  Climb stairs as needed    Incision site care and dressing  Changes as directed by your surgeon    YOUR INCISION IS CLOSED WITH ABSORBABLE SUTURES AND SKIN GLUE  May leave open to air. A clean 4x4 gauze may prevent clothing from rubbing incision.   Watch incision for any redness, drainage, increased warmth or opening of the incision. Call surgeon if you notice any of these.  No lotions or ointments on or near incision.  Always wash hands before and after touching incision.    Bathing  No tub baths, pools, or saunas for 6 weeks and until cleared by surgeon.  When able to shower, you may have water run down over the incision but do not scrub or  pick off the glue.  After showering, pat incision dry and may apply and allow for incision to dry thoroughly before covering it.  Do not apply any lotions or ointments to incision.     Return to work  When cleared by surgeon. Discuss specific work activities with your surgeon at follow up visit.  Light to sedentary work may be possible 2-3 weeks post op  Heavy work may be possible 6 weeks post op.    Sleeping  Use a firm mattress.  Lay on side or back, not stomach.  May use pillow under knees, between legs or behind back if lying on your side.    Diet and constipation prevention  Drink six to eight glasses liquid (water, juice) per day.  Eat a high fiber diet (bran, vegetables, fruit).  Use an over the counter stool softener such as Colace or senakot while taking narcotics to prevent constipation; use laxatives such as Miralax or Milk of Magnesia as needed.  An enema or suppository may be necessary if above measures do not work.    No smoking  Smoking will inhibit healing.  Even one cigarette a day will cause problems.  Chewing tobacco, nicotine gum or patches will also inhibit healing.    Pain Management  Relaxation techniques  A way to focus your attention other than on your pain. Your brain makes endorphins that are a natural body chemical that can decrease pain. Some examples of relaxation techniques are deep breathing, listening to music or meditating.  May use Cold therapy for 20 minutes multiple times per day.  Be sure there is a cloth barrier between skin and cold therapy.  Medication  Tylenol (acetaminophen) and Motrin (ibuprofen) are preferred. Caution if your pain med contains Tylenol (acetaminophen); maximum 3.5 gms of Tylenol (acetaminophen) in 24 hours. You can alternate between Tylenol and Motrin every 4 hrs.  A single aspirin (81 mg) for the heart is ok if ordered by your physician.  Take muscle relaxants and pain medications as prescribed allowing 30-45 minutes to take effect.  Do NOT take alcohol  while on pain medication.  Monitor need for pain medication closely  Call pharmacy or physician office at least five days before out of pain medication. If calling physician office after 3 pm, it will be handled on the next business day.    Post op office visit  Attend your follow up, already scheduled after surgery as directed at discharge  Schedule one week follow up with Primary Care Physician. Review all medications.    When to contact your surgeon  Temp > 101F; Take your temperature twice a day  Increased pain, swelling, redness, or any drainage to incision.  Separation of incision.  Sudden reappearance of pain that won’t go away with pain medication.  New numbness or weakness to arms or legs.  Difficulty urinating or having bowel movements  Headaches that worsen when standing and resolve when laying flat.    Go directly to the ER or CALL 911 if you:  become short of breath  have chest pain  cough up blood  have unexplained anxiety with breathing     HOME INSTRUCTIONS  [facility]G HOME CARE INSTRUCTIONS: POST-OP ANESTHESIA  The medication that you received for sedation or general anesthesia can last up to 24 hours. Your judgment and reflexes may be altered, even if you feel like your normal self.      We Recommend:   Do not drive any motor vehicle or bicycle   Avoid mowing the lawn, playing sports, or working with power tools/applicances (power saws, electric knives or mixers)   That you have someone stay with you on your first night home   Do not drink alcohol or take sleeping pills or tranquilizers   Do not sign legal documents within 24 hours of your procedure   If you had a nerve block for your surgery, take extra care not to put any pressure on your arm or hand for 24 hours    It is normal:  For you to have a sore throat if you had a breathing tube during surgery (while you were asleep!). The sore throat should get better within 48 hours. You can gargle with warm salt water (1/2 tsp in 4 oz warm water) or use  a throat lozenge for comfort  To feel muscle aches or soreness especially in the abdomen, chest or neck. The achy feeling should go away in the next 24 hours  To feel weak, sleepy or \"wiped out\". Your should start feeling better in the next 24 hours.   To experience mild discomforts such as sore lip or tongue, headache, cramps, gas pains or a bloated feeling in your abdomen.   To experience mild back pain or soreness for a day or two if you had spinal or epidural anesthesia.   If you had laparoscopic surgery, to feel shoulder pain or discomfort on the day of surgery.   For some patients to have nausea after surgery/anesthesia    If you feel nausea or experience vomiting:   Try to move around less.   Eat less than usual or drink only liquids until the next morning   Nausea should resolve in about 24 hours    If you have a problem when you are at home:    Call your surgeons office   Discharge Instructions: After Your Surgery  You’ve just had surgery. During surgery, you were given medicine called anesthesia to keep you relaxed and free of pain. After surgery, you may have some pain or nausea. This is common. Here are some tips for feeling better and getting well after surgery.   Going home  Your healthcare provider will show you how to take care of yourself when you go home. They'll also answer your questions. Have an adult family member or friend drive you home. For the first 24 hours after your surgery:   Don't drive or use heavy equipment.  Don't make important decisions or sign legal papers.  Take medicines as directed.  Don't drink alcohol.  Have someone stay with you, if needed. They can watch for problems and help keep you safe.  Be sure to go to all follow-up visits with your healthcare provider. And rest after your surgery for as long as your provider tells you to.   Coping with pain  If you have pain after surgery, pain medicine will help you feel better. Take it as directed, before pain becomes severe.  Also, ask your healthcare provider or pharmacist about other ways to control pain. This might be with heat, ice, or relaxation. And follow any other instructions your surgeon or nurse gives you.      Stay on schedule with your medicine.     Tips for taking pain medicine  To get the best relief possible, remember these points:   Pain medicines can upset your stomach. Taking them with a little food may help.  Most pain relievers taken by mouth need at least 20 to 30 minutes to start to work.  Don't wait till your pain becomes severe before you take your medicine. Try to time your medicine so that you can take it before starting an activity. This might be before you get dressed, go for a walk, or sit down for dinner.  Constipation is a common side effect of some pain medicines. Call your healthcare provider before taking any medicines such as laxatives or stool softeners to help ease constipation. Also ask if you should skip any foods. Drinking lots of fluids and eating foods such as fruits and vegetables that are high in fiber can also help. Remember, don't take laxatives unless your surgeon has prescribed them.  Drinking alcohol and taking pain medicine can cause dizziness and slow your breathing. It can even be deadly. Don't drink alcohol while taking pain medicine.  Pain medicine can make you react more slowly to things. Don't drive or run machinery while taking pain medicine.  Your healthcare provider may tell you to take acetaminophen to help ease your pain. Ask them how much you're supposed to take each day. Acetaminophen or other pain relievers may interact with your prescription medicines or other over-the-counter (OTC) medicines. Some prescription medicines have acetaminophen and other ingredients in them. Using both prescription and OTC acetaminophen for pain can cause you to accidentally overdose. Read the labels on your OTC medicines with care. This will help you to clearly know the list of ingredients,  how much to take, and any warnings. It may also help you not take too much acetaminophen. If you have questions or don't understand the information, ask your pharmacist or healthcare provider to explain it to you before you take the OTC medicine.   Managing nausea  Some people have an upset stomach (nausea) after surgery. This is often because of anesthesia, pain, or pain medicine, less movement of food in the stomach, or the stress of surgery. These tips will help you handle nausea and eat healthy foods as you get better. If you were on a special food plan before surgery, ask your healthcare provider if you should follow it while you get better. Check with your provider on how your eating should progress. It may depend on the surgery you had. These general tips may help:   Don't push yourself to eat. Your body will tell you when to eat and how much.  Start off with clear liquids and soup. They're easier to digest.  Next try semi-solid foods as you feel ready. These include mashed potatoes, applesauce, and gelatin.  Slowly move to solid foods. Don’t eat fatty, rich, or spicy foods at first.  Don't force yourself to have 3 large meals a day. Instead eat smaller amounts more often.  Take pain medicines with a small amount of solid food, such as crackers or toast. This helps prevent nausea.  When to call your healthcare provider  Call your healthcare provider right away if you have any of these:   You still have too much pain, or the pain gets worse, after taking the medicine. The medicine may not be strong enough. Or there may be a complication from the surgery.  You feel too sleepy, dizzy, or groggy. The medicine may be too strong.  Side effects such as nausea or vomiting. Your healthcare provider may advise taking other medicines to .  Skin changes such as rash, itching, or hives. This may mean you have an allergic reaction. Your provider may advise taking other medicines.  The incision looks different (for  instance, part of it opens up).  Bleeding or fluid leaking from the incision site, and weren't told to expect that.  Fever of 100.4°F (38°C) or higher, or as directed by your provider.  Call 911  Call 911 right away if you have:   Trouble breathing  Facial swelling    If you have obstructive sleep apnea   You were given anesthesia medicine during surgery to keep you comfortable and free of pain. After surgery, you may have more apnea spells because of this medicine and other medicines you were given. The spells may last longer than normal.    At home:  Keep using the continuous positive airway pressure (CPAP) device when you sleep. Unless your healthcare provider tells you not to, use it when you sleep, day or night. CPAP is a common device used to treat obstructive sleep apnea.  Talk with your provider before taking any pain medicine, muscle relaxants, or sedatives. Your provider will tell you about the possible dangers of taking these medicines.  Contact your provider if your sleeping changes a lot even when taking medicines as directed.

## 2025-01-15 ENCOUNTER — HOSPITAL ENCOUNTER (OUTPATIENT)
Facility: HOSPITAL | Age: 30
Setting detail: HOSPITAL OUTPATIENT SURGERY
Discharge: HOME | End: 2025-01-15
Attending: STUDENT IN AN ORGANIZED HEALTH CARE EDUCATION/TRAINING PROGRAM | Admitting: STUDENT IN AN ORGANIZED HEALTH CARE EDUCATION/TRAINING PROGRAM
Payer: MEDICAID

## 2025-01-15 ENCOUNTER — APPOINTMENT (OUTPATIENT)
Dept: GENERAL RADIOLOGY | Facility: HOSPITAL | Age: 30
End: 2025-01-15
Attending: STUDENT IN AN ORGANIZED HEALTH CARE EDUCATION/TRAINING PROGRAM
Payer: MEDICAID

## 2025-01-15 ENCOUNTER — ANESTHESIA (OUTPATIENT)
Dept: SURGERY | Facility: HOSPITAL | Age: 30
End: 2025-01-15
Payer: MEDICAID

## 2025-01-15 ENCOUNTER — ANESTHESIA EVENT (OUTPATIENT)
Dept: SURGERY | Facility: HOSPITAL | Age: 30
End: 2025-01-15
Payer: MEDICAID

## 2025-01-15 VITALS
DIASTOLIC BLOOD PRESSURE: 66 MMHG | HEIGHT: 74 IN | WEIGHT: 299 LBS | OXYGEN SATURATION: 98 % | SYSTOLIC BLOOD PRESSURE: 147 MMHG | BODY MASS INDEX: 38.37 KG/M2 | TEMPERATURE: 98 F | RESPIRATION RATE: 12 BRPM | HEART RATE: 75 BPM

## 2025-01-15 DIAGNOSIS — M51.16 LUMBAR DISC HERNIATION WITH RADICULOPATHY: ICD-10-CM

## 2025-01-15 DIAGNOSIS — M51.362 DEGENERATION OF INTERVERTEBRAL DISC OF LUMBAR REGION WITH DISCOGENIC BACK PAIN AND LOWER EXTREMITY PAIN: ICD-10-CM

## 2025-01-15 DIAGNOSIS — Z98.890 S/P LUMBAR MICRODISCECTOMY: Primary | ICD-10-CM

## 2025-01-15 DIAGNOSIS — M54.16 LUMBAR RADICULOPATHY: ICD-10-CM

## 2025-01-15 DIAGNOSIS — M47.816 LUMBAR SPONDYLOSIS: ICD-10-CM

## 2025-01-15 DIAGNOSIS — M51.26 LUMBAR DISC HERNIATION: ICD-10-CM

## 2025-01-15 DIAGNOSIS — M48.061 STENOSIS OF LATERAL RECESS OF LUMBAR SPINE: ICD-10-CM

## 2025-01-15 LAB — RH BLOOD TYPE: POSITIVE

## 2025-01-15 PROCEDURE — 0SB20ZZ EXCISION OF LUMBAR VERTEBRAL DISC, OPEN APPROACH: ICD-10-PCS | Performed by: STUDENT IN AN ORGANIZED HEALTH CARE EDUCATION/TRAINING PROGRAM

## 2025-01-15 PROCEDURE — 63047 LAM FACETEC & FORAMOT LUMBAR: CPT | Performed by: STUDENT IN AN ORGANIZED HEALTH CARE EDUCATION/TRAINING PROGRAM

## 2025-01-15 PROCEDURE — 01NB0ZZ RELEASE LUMBAR NERVE, OPEN APPROACH: ICD-10-PCS | Performed by: STUDENT IN AN ORGANIZED HEALTH CARE EDUCATION/TRAINING PROGRAM

## 2025-01-15 PROCEDURE — 76000 FLUOROSCOPY <1 HR PHYS/QHP: CPT | Performed by: STUDENT IN AN ORGANIZED HEALTH CARE EDUCATION/TRAINING PROGRAM

## 2025-01-15 RX ORDER — OXYCODONE HYDROCHLORIDE 5 MG/1
5 TABLET ORAL ONCE
Status: COMPLETED | OUTPATIENT
Start: 2025-01-15 | End: 2025-01-15

## 2025-01-15 RX ORDER — ONDANSETRON 2 MG/ML
INJECTION INTRAMUSCULAR; INTRAVENOUS AS NEEDED
Status: DISCONTINUED | OUTPATIENT
Start: 2025-01-15 | End: 2025-01-15 | Stop reason: SURG

## 2025-01-15 RX ORDER — ACETAMINOPHEN 500 MG
500 TABLET ORAL EVERY 4 HOURS PRN
Qty: 120 TABLET | Refills: 0 | Status: SHIPPED | OUTPATIENT
Start: 2025-01-15

## 2025-01-15 RX ORDER — ROCURONIUM BROMIDE 10 MG/ML
INJECTION, SOLUTION INTRAVENOUS AS NEEDED
Status: DISCONTINUED | OUTPATIENT
Start: 2025-01-15 | End: 2025-01-15 | Stop reason: SURG

## 2025-01-15 RX ORDER — SODIUM CHLORIDE, SODIUM LACTATE, POTASSIUM CHLORIDE, CALCIUM CHLORIDE 600; 310; 30; 20 MG/100ML; MG/100ML; MG/100ML; MG/100ML
INJECTION, SOLUTION INTRAVENOUS CONTINUOUS
Status: DISCONTINUED | OUTPATIENT
Start: 2025-01-15 | End: 2025-01-15

## 2025-01-15 RX ORDER — MORPHINE SULFATE 10 MG/ML
6 INJECTION, SOLUTION INTRAMUSCULAR; INTRAVENOUS EVERY 10 MIN PRN
Status: DISCONTINUED | OUTPATIENT
Start: 2025-01-15 | End: 2025-01-15

## 2025-01-15 RX ORDER — METHOCARBAMOL 500 MG/1
500 TABLET, FILM COATED ORAL ONCE
Status: COMPLETED | OUTPATIENT
Start: 2025-01-15 | End: 2025-01-15

## 2025-01-15 RX ORDER — CEFAZOLIN SODIUM IN 0.9 % NACL 3 G/100 ML
3 INTRAVENOUS SOLUTION, PIGGYBACK (ML) INTRAVENOUS ONCE
Status: COMPLETED | OUTPATIENT
Start: 2025-01-15 | End: 2025-01-15

## 2025-01-15 RX ORDER — OXYCODONE HYDROCHLORIDE 5 MG/1
5 TABLET ORAL EVERY 4 HOURS PRN
Qty: 30 TABLET | Refills: 0 | Status: SHIPPED | OUTPATIENT
Start: 2025-01-15 | End: 2025-01-20

## 2025-01-15 RX ORDER — METHYLPREDNISOLONE ACETATE 40 MG/ML
INJECTION, SUSPENSION INTRA-ARTICULAR; INTRALESIONAL; INTRAMUSCULAR; SOFT TISSUE AS NEEDED
Status: DISCONTINUED | OUTPATIENT
Start: 2025-01-15 | End: 2025-01-15 | Stop reason: HOSPADM

## 2025-01-15 RX ORDER — MORPHINE SULFATE 4 MG/ML
4 INJECTION, SOLUTION INTRAMUSCULAR; INTRAVENOUS EVERY 10 MIN PRN
Status: DISCONTINUED | OUTPATIENT
Start: 2025-01-15 | End: 2025-01-15

## 2025-01-15 RX ORDER — FAMOTIDINE 20 MG/1
20 TABLET, FILM COATED ORAL ONCE
Status: COMPLETED | OUTPATIENT
Start: 2025-01-15 | End: 2025-01-15

## 2025-01-15 RX ORDER — HYDROMORPHONE HYDROCHLORIDE 1 MG/ML
0.4 INJECTION, SOLUTION INTRAMUSCULAR; INTRAVENOUS; SUBCUTANEOUS EVERY 5 MIN PRN
Status: DISCONTINUED | OUTPATIENT
Start: 2025-01-15 | End: 2025-01-15

## 2025-01-15 RX ORDER — DEXAMETHASONE SODIUM PHOSPHATE 4 MG/ML
VIAL (ML) INJECTION AS NEEDED
Status: DISCONTINUED | OUTPATIENT
Start: 2025-01-15 | End: 2025-01-15 | Stop reason: SURG

## 2025-01-15 RX ORDER — METHOCARBAMOL 500 MG/1
500 TABLET, FILM COATED ORAL 3 TIMES DAILY PRN
Qty: 60 TABLET | Refills: 0 | Status: SHIPPED | OUTPATIENT
Start: 2025-01-15

## 2025-01-15 RX ORDER — HYDROMORPHONE HYDROCHLORIDE 1 MG/ML
0.6 INJECTION, SOLUTION INTRAMUSCULAR; INTRAVENOUS; SUBCUTANEOUS EVERY 5 MIN PRN
Status: DISCONTINUED | OUTPATIENT
Start: 2025-01-15 | End: 2025-01-15

## 2025-01-15 RX ORDER — PROCHLORPERAZINE EDISYLATE 5 MG/ML
5 INJECTION INTRAMUSCULAR; INTRAVENOUS EVERY 8 HOURS PRN
Status: DISCONTINUED | OUTPATIENT
Start: 2025-01-15 | End: 2025-01-15

## 2025-01-15 RX ORDER — NALOXONE HYDROCHLORIDE 0.4 MG/ML
0.08 INJECTION, SOLUTION INTRAMUSCULAR; INTRAVENOUS; SUBCUTANEOUS AS NEEDED
Status: DISCONTINUED | OUTPATIENT
Start: 2025-01-15 | End: 2025-01-15

## 2025-01-15 RX ORDER — METOCLOPRAMIDE 10 MG/1
10 TABLET ORAL ONCE
Status: COMPLETED | OUTPATIENT
Start: 2025-01-15 | End: 2025-01-15

## 2025-01-15 RX ORDER — BUPIVACAINE HYDROCHLORIDE 2.5 MG/ML
INJECTION, SOLUTION EPIDURAL; INFILTRATION; INTRACAUDAL AS NEEDED
Status: DISCONTINUED | OUTPATIENT
Start: 2025-01-15 | End: 2025-01-15 | Stop reason: HOSPADM

## 2025-01-15 RX ORDER — HYDROMORPHONE HYDROCHLORIDE 1 MG/ML
0.2 INJECTION, SOLUTION INTRAMUSCULAR; INTRAVENOUS; SUBCUTANEOUS EVERY 5 MIN PRN
Status: DISCONTINUED | OUTPATIENT
Start: 2025-01-15 | End: 2025-01-15

## 2025-01-15 RX ORDER — GLYCOPYRROLATE 0.2 MG/ML
INJECTION, SOLUTION INTRAMUSCULAR; INTRAVENOUS AS NEEDED
Status: DISCONTINUED | OUTPATIENT
Start: 2025-01-15 | End: 2025-01-15 | Stop reason: SURG

## 2025-01-15 RX ORDER — FAMOTIDINE 10 MG/ML
20 INJECTION, SOLUTION INTRAVENOUS ONCE
Status: COMPLETED | OUTPATIENT
Start: 2025-01-15 | End: 2025-01-15

## 2025-01-15 RX ORDER — LIDOCAINE HYDROCHLORIDE 10 MG/ML
INJECTION, SOLUTION EPIDURAL; INFILTRATION; INTRACAUDAL; PERINEURAL AS NEEDED
Status: DISCONTINUED | OUTPATIENT
Start: 2025-01-15 | End: 2025-01-15 | Stop reason: SURG

## 2025-01-15 RX ORDER — MORPHINE SULFATE 4 MG/ML
2 INJECTION, SOLUTION INTRAMUSCULAR; INTRAVENOUS EVERY 10 MIN PRN
Status: DISCONTINUED | OUTPATIENT
Start: 2025-01-15 | End: 2025-01-15

## 2025-01-15 RX ORDER — ONDANSETRON 2 MG/ML
4 INJECTION INTRAMUSCULAR; INTRAVENOUS EVERY 6 HOURS PRN
Status: DISCONTINUED | OUTPATIENT
Start: 2025-01-15 | End: 2025-01-15

## 2025-01-15 RX ORDER — MIDAZOLAM HYDROCHLORIDE 1 MG/ML
INJECTION INTRAMUSCULAR; INTRAVENOUS AS NEEDED
Status: DISCONTINUED | OUTPATIENT
Start: 2025-01-15 | End: 2025-01-15 | Stop reason: SURG

## 2025-01-15 RX ORDER — ACETAMINOPHEN 500 MG
1000 TABLET ORAL ONCE
Status: COMPLETED | OUTPATIENT
Start: 2025-01-15 | End: 2025-01-15

## 2025-01-15 RX ORDER — METOCLOPRAMIDE HYDROCHLORIDE 5 MG/ML
10 INJECTION INTRAMUSCULAR; INTRAVENOUS ONCE
Status: COMPLETED | OUTPATIENT
Start: 2025-01-15 | End: 2025-01-15

## 2025-01-15 RX ADMIN — ROCURONIUM BROMIDE 55 MG: 10 INJECTION, SOLUTION INTRAVENOUS at 07:48:00

## 2025-01-15 RX ADMIN — SODIUM CHLORIDE, SODIUM LACTATE, POTASSIUM CHLORIDE, CALCIUM CHLORIDE: 600; 310; 30; 20 INJECTION, SOLUTION INTRAVENOUS at 07:32:00

## 2025-01-15 RX ADMIN — SODIUM CHLORIDE, SODIUM LACTATE, POTASSIUM CHLORIDE, CALCIUM CHLORIDE: 600; 310; 30; 20 INJECTION, SOLUTION INTRAVENOUS at 08:36:00

## 2025-01-15 RX ADMIN — CEFAZOLIN SODIUM IN 0.9 % NACL 3 G: 3 G/100 ML INTRAVENOUS SOLUTION, PIGGYBACK (ML) INTRAVENOUS at 07:43:00

## 2025-01-15 RX ADMIN — GLYCOPYRROLATE 0.2 MG: 0.2 INJECTION, SOLUTION INTRAMUSCULAR; INTRAVENOUS at 07:29:00

## 2025-01-15 RX ADMIN — DEXAMETHASONE SODIUM PHOSPHATE 8 MG: 4 MG/ML VIAL (ML) INJECTION at 07:53:00

## 2025-01-15 RX ADMIN — ROCURONIUM BROMIDE 10 MG: 10 INJECTION, SOLUTION INTRAVENOUS at 08:24:00

## 2025-01-15 RX ADMIN — LIDOCAINE HYDROCHLORIDE 50 MG: 10 INJECTION, SOLUTION EPIDURAL; INFILTRATION; INTRACAUDAL; PERINEURAL at 07:38:00

## 2025-01-15 RX ADMIN — ROCURONIUM BROMIDE 5 MG: 10 INJECTION, SOLUTION INTRAVENOUS at 07:38:00

## 2025-01-15 RX ADMIN — SODIUM CHLORIDE, SODIUM LACTATE, POTASSIUM CHLORIDE, CALCIUM CHLORIDE: 600; 310; 30; 20 INJECTION, SOLUTION INTRAVENOUS at 09:27:00

## 2025-01-15 RX ADMIN — ONDANSETRON 8 MG: 2 INJECTION INTRAMUSCULAR; INTRAVENOUS at 09:08:00

## 2025-01-15 RX ADMIN — SODIUM CHLORIDE, SODIUM LACTATE, POTASSIUM CHLORIDE, CALCIUM CHLORIDE: 600; 310; 30; 20 INJECTION, SOLUTION INTRAVENOUS at 08:33:00

## 2025-01-15 RX ADMIN — MIDAZOLAM HYDROCHLORIDE 2 MG: 1 INJECTION INTRAMUSCULAR; INTRAVENOUS at 07:29:00

## 2025-01-15 NOTE — BRIEF OP NOTE
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]  Lumbar disc herniation [M51.26]     Post-Operative Diagnosis: Degeneration of intervertebral disc of lumbar region with discogenic back pain and lower extremity pain.Lumbar disc herniation with radiculopathy.Lumbar spondylosis.Lumbar radiculopathy.Stenosis of lateral recess of lumbar spine.Lumbar disc herniation      Procedure Performed:   Minimally invasive bilateral Lumbar 4 to 5 laminoforaminotomies, microdiscectomy, approaching from the left    Surgeons and Role:     * Kel Crawford MD - Primary    Assistant(s):  PA: Ivan Collier PA-C     Surgical Findings: As expected preop     Specimen: Lumbar disc herniation sent for pathologic examination     Estimated Blood Loss: 5 mL    Kel Crawford MD  Neurological Surgery    [facility] [address], [address] [place] [id number]  [phone]  Pager [phone]  1/15/2025 9:10 AM      This note was created using a voice-recognition transcribing system. Incorrect words or phrases may have been missed during proofreading. Please interpret accordingly.

## 2025-01-15 NOTE — ANESTHESIA PREPROCEDURE EVALUATION
Anesthesia PreOp Note    HPI:     Samuel Hill is a 29 year old male who presents for preoperative consultation requested by: Kel Crawford MD    Date of Surgery: 1/15/2025    Procedure(s):  Minimally invasive bilateral Lumbar 4 to 5 laminoforaminotomies, microdiscectomy, approaching from the left  Indication: Degeneration of intervertebral disc of lumbar region with discogenic back pain and lower extremity pain [M51.362]  Lumbar disc herniation with radiculopathy [M51.16]  Lumbar spondylosis [M47.816]  Lumbar radiculopathy [M54.16]  Stenosis of lateral recess of lumbar spine [M48.061]  Lumbar disc herniation [M51.26]    Relevant Problems   No relevant active problems       NPO:  Last Liquid Consumption Date: 01/14/25  Last Liquid Consumption Time: 2300  Last Solid Consumption Date: 01/14/25  Last Solid Consumption Time: 1900  Last Liquid Consumption Date: 01/14/25          History Review:  Patient Active Problem List    Diagnosis Date Noted    Lumbar disc herniation 10/04/2024    Acute gastritis without hemorrhage 01/23/2024    Anxiety 01/23/2024    PUD (peptic ulcer disease) 01/23/2024    Lumbar radiculopathy 08/05/2020    Shoulder pain, right 08/05/2020       Past Medical History:    Anxiety    Back pain    Back problem    Depression    History of stomach ulcers    Hx of motion sickness       Past Surgical History:   Procedure Laterality Date    Adenoidectomy      Appendectomy  03/2023    Appendectomy      Create eardrum opening,gen anesth      Other      ear tubes    Removal adenoids,primary,12+ y/o      Removal of tonsils,12+ y/o      and ademoids    Tonsillectomy         Prescriptions Prior to Admission[1]  Current Medications and Prescriptions Ordered in Epic[2]    Allergies[3]    Family History   Problem Relation Age of Onset    Arthritis Mother     Diabetes Paternal Grandmother     Heart Disorder Maternal Grandmother     Heart Disorder Maternal Grandfather     Diabetes Maternal Grandfather       Social History     Socioeconomic History    Marital status: Single   Tobacco Use    Smoking status: Never     Passive exposure: Yes    Smokeless tobacco: Never   Vaping Use    Vaping status: Never Used   Substance and Sexual Activity    Alcohol use: Yes     Comment: weekends    Drug use: Yes     Frequency: 70.0 times per week     Types: Cannabis   Other Topics Concern    Caffeine Concern No    Exercise No       Available pre-op labs reviewed.  Lab Results   Component Value Date    WBC 7.5 01/02/2025    RBC 4.92 01/02/2025    HGB 15.8 01/02/2025    HCT 42.9 01/02/2025    MCV 87.2 01/02/2025    MCH 32.1 01/02/2025    MCHC 36.8 01/02/2025    RDW 12.3 01/02/2025    .0 01/02/2025     Lab Results   Component Value Date     01/02/2025    K 4.2 01/02/2025     01/02/2025    CO2 26.0 01/02/2025    BUN 15 01/02/2025    CREATSERUM 1.15 01/02/2025    GLU 84 01/02/2025    CA 9.9 01/02/2025     Lab Results   Component Value Date    INR 0.93 01/02/2025       Vital Signs:  Body mass index is 38.39 kg/m².   height is 1.88 m (6' 2\") and weight is 135.6 kg (299 lb). His blood pressure is 151/82 and his pulse is 73. His respiration is 18 and oxygen saturation is 98%.   Vitals:    01/10/25 1133 01/15/25 0611   BP:  151/82   Pulse:  73   Resp:  18   SpO2:  98%   Weight: 131.5 kg (290 lb) 135.6 kg (299 lb)   Height: 1.88 m (6' 2\") 1.88 m (6' 2\")        Anesthesia Evaluation     Patient summary reviewed and Nursing notes reviewed    No history of anesthetic complications   Airway   Mallampati: III  TM distance: <3 FB  Neck ROM: full  Dental - Dentition appears grossly intact         Pulmonary - negative ROS and normal exam   Cardiovascular - negative ROS and normal exam  Exercise tolerance: good    Neuro/Psych    (+)  anxiety/panic attacks,  depression      GI/Hepatic/Renal    (+) GERD    Endo/Other - negative ROS   Abdominal                  Anesthesia Plan:   ASA:  2  Plan:   General  Airway:  ETT and Video  laryngoscope  Post-op Pain Management: Oral pain medication and IV analgesics  Plan Comments: Scop patch for ponv. Risk of dental injury w intubation discussed  Informed Consent Plan and Risks Discussed With:  Patient and mother  Discussed plan with:  CRNA      I have informed Samuel Hill and/or legal guardian or family member of the nature of the anesthetic plan, benefits, risks including possible dental damage if relevant, major complications, and any alternative forms of anesthetic management.   All of the patient's questions were answered to the best of my ability. The patient desires the anesthetic management as planned.  Nayana Gibson MD  1/15/2025 6:48 AM  Present on Admission:  **None**           [1]   Medications Prior to Admission   Medication Sig Dispense Refill Last Dose/Taking    Multiple Vitamin (ONE-DAILY MULTI VITAMINS) Oral Tab Take 1 tablet by mouth daily.   1/8/2025    Omega-3 Fatty Acids (FISH OIL OR) Take 1 capsule by mouth daily.   1/8/2025    HYDROcodone-acetaminophen 5-325 MG Oral Tab Take 1 tablet by mouth every 8 (eight) hours as needed for Pain. 18 tablet 0 1/14/2025 at  8:00 PM    alprazolam 2 MG Oral Tab Take 1 tablet (2 mg total) by mouth As Directed.   1/15/2025 at  5:00 AM    cyclobenzaprine 10 MG Oral Tab Take 1 tablet (10 mg total) by mouth nightly as needed for Muscle spasms. 30 tablet 0 Unknown   [2]   Current Facility-Administered Medications Ordered in Epic   Medication Dose Route Frequency Provider Last Rate Last Admin    lactated ringers infusion   Intravenous Continuous Kel Crawford MD 20 mL/hr at 01/15/25 0637 New Bag at 01/15/25 0637    ceFAZolin (Ancef) 3 g in sodium chloride 0.9% 100mL IVPB premix  3 g Intravenous Once Kel Crawford MD         No current [facility]-ordered outpatient medications on file.   [3] No Known Allergies

## 2025-01-15 NOTE — ANESTHESIA POSTPROCEDURE EVALUATION
Patient: Samuel Hill    Procedure Summary       Date: 01/15/25 Room / Location: [facility] MAIN OR  / [facility] MAIN OR    Anesthesia Start: 0729 Anesthesia Stop: 0929    Procedure: Minimally invasive bilateral Lumbar 4 to 5 laminoforaminotomies, microdiscectomy, approaching from the left (Bilateral: Spine Lumbar) Diagnosis:       Degeneration of intervertebral disc of lumbar region with discogenic back pain and lower extremity pain      Lumbar disc herniation with radiculopathy      Lumbar spondylosis      Lumbar radiculopathy      Stenosis of lateral recess of lumbar spine      Lumbar disc herniation      (Degeneration of intervertebral disc of lumbar region with discogenic back pain and lower extremity pain.Lumbar disc herniation with radiculopathy.Lumbar spondylosis.Lumbar radiculopathy.Stenosis of lateral recess of lumbar spine.Lumbar disc herniation)    Surgeons: Kel Crawford MD Anesthesiologist: Nayana Gibson MD    Anesthesia Type: general ASA Status: 2            Anesthesia Type: general    Vitals Value Taken Time   /104 01/15/25 0929   Temp 97 °F (36.1 °C) 01/15/25 0929   Pulse 94 01/15/25 0929   Resp 20 01/15/25 0929   SpO2 96 % 01/15/25 0929   Vitals shown include unfiled device data.    [facility] AN Post Evaluation:   Patient Evaluated in PACU  Patient Participation: complete - patient participated  Level of Consciousness: awake and alert  Pain Score: 9  Pain Management: adequate  Airway Patency:patent  Dental exam unchanged from preop  Yes    Nausea/Vomiting: none  Cardiovascular Status: acceptable and stable  Respiratory Status: acceptable  Postoperative Hydration acceptable      Mara Quispe CRNA  1/15/2025 9:29 AM

## 2025-01-15 NOTE — OPERATIVE REPORT
[facility]  part of Located within [facility]    Operative Note         Samuel Hill Location: OR   [facility] [id number] MRN [id number]   Admission Date 1/15/2025 Operation Date 1/15/2025   Attending Physician Kel Crawford MD       Patient Name: Samuel Hill     Date of surgery:  1/15/2025    Surgeon:  Kel Crawford MD    Assistant:  Ivan Collier PA-C     (A skilled surgical assistant was medically necessary due to the complexity of multi-level decompression, need for precise retraction, and preservation of neural structures)    Preoperative diagnosis:  Lumbalgia  Lumbar spondylosis  Degenerative disc disease  Lumbar lateral recess stenosis (L4-5 bilaterally)  Lumbar disc herniation with radiculopathy (L4-5 with left L5 radiculopathy)  Class II obesity (BMI: 38.39 kg/m²)     Postoperative diagnosis:  Lumbalgia  Lumbar spondylosis  Degenerative disc disease  Lumbar lateral recess stenosis (L4-5 bilaterally)  Lumbar disc herniation with radiculopathy (L4-5 with left L5 radiculopathy)  Class II obesity (BMI: 38.39 kg/m²)     Procedure performed:  Minimally invasive bilateral L4-5 laminotomies, partial medial facetectomies for bilateral nerve root decompression  Left L4-5 discectomy.  Use of intraoperative fluoroscopy including interpretation of x-rays  Use of operating microscope     Indications for procedure:   is a 29-year-old male who was referred to my clinic with complaints of low back and bilateral (left greater than right) leg pain that had been unrelenting, refractory to nonoperative treatment including epidural steroid injections. He was neurologically intact on exam and his imaging studies demonstrated a left paracentral disc protrusion at L4-5 that impinged on the left L5 nerve root and led to bilateral lateral recess stenosis.  Given the circumstances, I felt surgical treatment of his pathology was indicated, could prove beneficial, and thus recommended it to him. Prior to  surgery and during preoperative counseling, the risks, benefits, and potential outcomes of the surgical intervention were outlined to Mr. Hill using language he could comprehend. He expressed his understanding and elected to proceed.      Procedure in detail:  The patient was identified and taken to the operating room. He was placed under general endotracheal anesthesia without complication. He was positioned prone on a Joshua frame, which laid on top of a flat Miguel table. His shoulders were abducted and arms flexed to 90 degrees. All pressure points were appropriately padded. He was firmly secured to the operating table. He received the appropriate preoperative antibiotic prophylaxis. Sequential compression devices were maintained on the lower extremities for DVT prophylaxis. The lumbar region was identified. It was prepped and draped in sterile fashion. An appropriate time out was performed following standard protocol. Under fluoroscopic guidance, a spinal needle was used to localize the L4-5 interspace and accordingly plan the surgical incision.    A 2 cm incision was made 1.5 cm to the left of midline overlying the L4-5 interspace.  Using a guidewire, a series of sequential dilators and tubular retractor, access was gained to the inferior aspect of the left L4 lamina, the medial aspect of the left L4-5 facet joint and left L4-5 interlaminar space. Intraoperative fluoroscopy was used to confirm adequate positioning of the tubular apparatus after which the operating microscope was brought in for better visualization. Under microscopic magnification, soft tissue dissection was performed exposing the aforementioned landmarks. A high-speed drill was then used to perform an inferior laminotomy of L4. The bony resection was taken across midline undercutting the spinous process until the contralateral lateral recess was identified. The bony resection was taken superiorly until the ligamentum flavum could be  detached from the undersurface of the L4 lamina. The ligamentum flavum was elevated and resected, exposing the dura beneath. Bilateral lateral recess decompressions were then performed using a series of rongeurs and curettes as well as the high speed drill. Safe exposure to the nerve roots necessitated partial medial facetectomies. This was done until the lateral border of the dura and traversing L5 nerve roots were identified and freed dorsally along their course within the subarticular compartment to the level of the L5 pedicles until we found them to be satisfactorily decompressed along its ventral and dorsal aspects. We then retracted the lateral border of the dura and the left L5 nerve root medially exposing the subannular disc protrusion at L4-5.  We coagulated the overlying epidural vasculature, incised the annulus and removed multiple herniated disc fragments piecemeal until the posterior aspect of the left L4-5 disc was flush with the posterior aspect of the L4 and L5 vertebral bodies. Afterwards, we achieved satisfactory hemostasis and again tracked the path of the bilateral L5 nerve roots within their subarticular compartments to the level of the bilateral L5 pedicles, found them to be satisfactorily decompressed along their ventral and dorsal aspects. We again achieved adequate hemostasis, irrigated the surgical field and placed 40 mg of Depo-Medrol into the epidural space overlying the thecal sac and the bilateral L5 nerve roots. We then pulled out the tubular retractor, ensuring to coagulate any actively bleeding muscle tissue on the way out. The surgical incision was then closed in multiple layers and reapproximated at the skin with 4-0 Vicryl in a subcuticular fashion. The patient was then allowed to emerge from general anesthesia and was subsequently extubated. He was taken to the recovery room for further convalescence.    Complexity: High. The complexity of the case was higher than average given  the patient's body habitus, which prolonged surgery over the anticipated time due to difficult positioning, extensive exposure, meticulous dissection required to identify and preserve anatomical structures, and complex wound closure. The use of advanced technologies such as use of the operative microscope further contributed to the complexity of the case.     Anesthesia: General endotracheal.    Estimated blood loss: 5 mL.    Counts: All needle, sponge, and cottonoid counts were reported correct at the end of the operation.     Complications: None.     Drains: None.     Implants: None.     Specimen:   ID Type Source Tests Collected by Time Destination   1 : 1. Left L4-5 herniated disc Tissue Lumbar disc herniation SURGICAL PATHOLOGY TISSUE Kel Crawford MD 1/15/2025  9:10 AM       Wound classification: 1, clean.    Disposition: Home to self care.     Condition: Stable, neurologically at baseline.    Kel Crawford MD  Neurological Surgery    [address], [address] [place], [place] [id number] [phone]  Pager [phone]  1/15/2025 9:11 AM      This note was created using a voice-recognition transcribing system. Incorrect words or phrases may have been missed during proofreading. Please interpret accordingly.

## 2025-01-15 NOTE — INTERVAL H&P NOTE
Pre-op Diagnosis: Degeneration of intervertebral disc of lumbar region with discogenic back pain and lower extremity pain [M51.362]  Lumbar disc herniation with radiculopathy [M51.16]  Lumbar spondylosis [M47.816]  Lumbar radiculopathy [M54.16]  Stenosis of lateral recess of lumbar spine [M48.061]  Lumbar disc herniation [M51.26]    The above referenced H&P was reviewed by Kel Crawford MD on 1/15/2025, the patient was examined and no significant changes have occurred in the patient's condition since the H&P was performed.  I discussed the proposed operation with Mr.?Samuel VENESSA Hill in detail, including the risks, benefits, and alternatives to surgery, using language he could understand. The risks discussed included, but were not limited to, neurologic injury, such as spinal cord or nerve root injury, potentially resulting in temporary or permanent deficits, including weakness, numbness, or paralysis; infection, ranging from superficial wound infection to deep infections, such as vertebral osteomyelitis or epidural abscess; intraoperative bleeding from blood vessel injury, which could lead to stroke, hematoma, or death from exsanguination; postoperative hemorrhage, which could result in neurologic compromise; dural tear, possibly leading to pseudomeningocele or cerebrospinal fluid (CSF) fistula despite intraoperative repair; hardware malposition, failure, or migration, which may require corrective surgery; non-union or pseudoarthrosis, potentially necessitating reoperation; adjacent segment disease, which could result in additional spinal degeneration or require further intervention; substantial blood loss, which could necessitate transfusion; paralysis or loss of bladder and bowel control; injury to adjacent structures, including major blood vessels, visceral organs, or the pleura, potentially leading to pneumothorax; and complications related to anesthesia, including acute myocardial infarction, venous  thromboembolism, pneumonia, urinary tract infection, or death.    Non-surgical alternatives and the potential risks and benefits of foregoing surgical intervention were also reviewed. Mr.?Samuel VENESSA Hill demonstrated understanding of the above information, asked appropriate questions, and confirmed that all questions were answered to satisfaction. He has voluntarily elected to proceed with surgery, which is scheduled for today, 1/15/2025. Written informed consent has been obtained and documented.    Kel Crawford MD  Neurological Surgery    [address], [address] [place] [address]  [phone]  Pager [phone]  1/15/2025 6:27 AM      This note was created using a voice-recognition transcribing system. Incorrect words or phrases may have been missed during proofreading. Please interpret accordingly.

## 2025-01-15 NOTE — ANESTHESIA PROCEDURE NOTES
Airway  Date/Time: 1/15/2025 7:40 AM  Urgency: elective    Airway not difficult    General Information and Staff    Patient location during procedure: OR  Anesthesiologist: Nayana Gibson MD  Resident/CRNA: Mara Quispe CRNA  Performed: CRNA   Performed by: Mara Quispe CRNA  Authorized by: Nayana Gibson MD      Indications and Patient Condition  Indications for airway management: airway protection and anesthesia  Spontaneous Ventilation: absent  Sedation level: deep  Preoxygenated: yes  Patient position: sniffing  Mask difficulty assessment: 0 - not attempted  Planned trial extubation    Final Airway Details  Final airway type: endotracheal airway      Successful airway: ETT  Cuffed: yes   Successful intubation technique: Video laryngoscopy  Blade: Theo  Blade size: #4  ETT size (mm): 7.5    Cormack-Lehane Classification: grade I - full view of glottis  Placement verified by: capnometry   Measured from: lips  ETT to lips (cm): 22  Number of attempts at approach: 1

## 2025-01-20 DIAGNOSIS — Z98.890 S/P LUMBAR MICRODISCECTOMY: ICD-10-CM

## 2025-01-20 RX ORDER — OXYCODONE HYDROCHLORIDE 5 MG/1
5 TABLET ORAL EVERY 4 HOURS PRN
Qty: 30 TABLET | Refills: 0 | Status: SHIPPED | OUTPATIENT
Start: 2025-01-20 | End: 2025-01-27

## 2025-01-20 RX ORDER — OXYCODONE HYDROCHLORIDE 5 MG/1
5 TABLET ORAL EVERY 4 HOURS PRN
Refills: 0 | Status: CANCELLED | OUTPATIENT
Start: 2025-01-20

## 2025-01-20 NOTE — TELEPHONE ENCOUNTER
Patient calling to request refill of Oxycodone 5 mg   Patient  does not have more medication for Today.   [facility] in [place]     Patient informed of 48 hour refill policy excluding weekends and holidays.     Informed patient prescription is sent directly to pharmacy.     Further explained patient will not receive a call back once prescription is ready.

## 2025-01-20 NOTE — TELEPHONE ENCOUNTER
Medication: oxyCODONE 5 MG Oral Tab      Date of last refill: 1/15/25 (#30/0)  [place]      Last office visit: 1/02/25  Due back to clinic per last office note:    Date next office visit scheduled:    Future Appointments   Date Time Provider Department Center   2/6/2025 11:45 AM Ivan Collier PA-C [facility]   2/26/2025 11:45 AM Ivan Collier PA-C [facility]           Last OV note recommendation:    \"ASSESSMENT:  Mr. Hill continues to exhibit classic signs of left L4-5 disc herniation and associated radiculopathy. Conservative measures have failed to provide meaningful or lasting relief, and he reports worsening radicular pain. Despite his gastrointestinal sensitivities to analgesics, his condition remains best managed surgically at this stage. He is scheduled for a minimally invasive left L4-5 microdiscectomy on 1/15/2025. Preoperative labs and final medical clearance are to be completed prior to surgery. His overall medical status remains stable, with no new contraindications for proceeding.      PLAN:    - Proceed with minimally invasive left L4-5 microdiscectomy on 1/15/2025.      - Obtain final surgical clearance from PCP    - Complete the required laboratory tests (CBC with differential, CMP, PT, aPTT, type and screen, etc.) on the first floor lab today if possible.      - Obtain an updated EKG if indicated by clearance.    - Continue current acetaminophen regimen.    - Advised daily proton pump inhibitor (e.g., omeprazole or esomeprazole) and/or antacids (Tums) to mitigate GI side effects.      - Refrain from introducing higher-potency opioids preoperatively to preserve postoperative analgesic options.    - Avoid heavy lifting, bending, or twisting until surgery.      - Maintain light walking or gentle activity to promote circulation.      - Monitor for any new neurological or GI-related symptoms.\"    Routed to Provider.

## 2025-01-27 DIAGNOSIS — Z98.890 S/P LUMBAR MICRODISCECTOMY: ICD-10-CM

## 2025-01-28 RX ORDER — OXYCODONE HYDROCHLORIDE 5 MG/1
5 TABLET ORAL EVERY 4 HOURS PRN
Qty: 30 TABLET | Refills: 0 | Status: SHIPPED | OUTPATIENT
Start: 2025-01-28 | End: 2025-02-03

## 2025-01-28 NOTE — TELEPHONE ENCOUNTER
Medication:   oxyCODONE 5 MG Oral Tab 30 tablet 0 1/20/2025 --   Sig:   Take 1 tablet (5 mg total) by mouth every 4 (four) hours as needed          Date of last refill: 1.20.25 (#30/0)  [place]      Last office visit: 1.2.2025  Due back to clinic per last office note:    Date next office visit scheduled:  2.6.2025  Future Appointments   Date Time Provider Department Center   2/6/2025 11:45 AM Ivan Collier PA-C [facility]   2/26/2025 11:45 AM Ivan Collier PA-C [facility]           Last OV note recommendation:    \"PLAN:    - Proceed with minimally invasive left L4-5 microdiscectomy on 1/15/2025.      - Obtain final surgical clearance from PCP    - Complete the required laboratory tests (CBC with differential, CMP, PT, aPTT, type and screen, etc.) on the first floor lab today if possible.      - Obtain an updated EKG if indicated by clearance.    - Continue current acetaminophen regimen.    - Advised daily proton pump inhibitor (e.g., omeprazole or esomeprazole) and/or antacids (Tums) to mitigate GI side effects.      - Refrain from introducing higher-potency opioids preoperatively to preserve postoperative analgesic options.    - Avoid heavy lifting, bending, or twisting until surgery.      - Maintain light walking or gentle activity to promote circulation.      - Monitor for any new neurological or GI-related symptoms. \"

## 2025-02-03 DIAGNOSIS — Z98.890 S/P LUMBAR MICRODISCECTOMY: ICD-10-CM

## 2025-02-03 RX ORDER — OXYCODONE HYDROCHLORIDE 5 MG/1
5 TABLET ORAL EVERY 4 HOURS PRN
Qty: 30 TABLET | Refills: 0 | Status: SHIPPED | OUTPATIENT
Start: 2025-02-03

## 2025-02-03 RX ORDER — ACETAMINOPHEN 500 MG
500 TABLET ORAL EVERY 4 HOURS PRN
Qty: 120 TABLET | Refills: 0 | Status: SHIPPED | OUTPATIENT
Start: 2025-02-03

## 2025-02-03 NOTE — TELEPHONE ENCOUNTER
Medication:   oxyCODONE 5 MG Oral Tab 30 tablet 0 1/28/2025 --   Sig:   Take 1 tablet (5 mg total) by mouth every 4 (four) hours as needed.          Date of last refill: 1.28.2025 (#30/0)  [place]      Last office visit: pt had SX on 1.15.2025  Due back to clinic per last office note:    Date next office visit scheduled:  2.6.2025  Future Appointments   Date Time Provider Department Center   2/6/2025 11:45 AM Ivan Collier PA-C [facility]   2/26/2025 11:45 AM Ivan Collier PA-C [facility]           Last OV note recommendation:      \" PLAN:    - Proceed with minimally invasive left L4-5 microdiscectomy on 1/15/2025.      - Obtain final surgical clearance from PCP    - Complete the required laboratory tests (CBC with differential, CMP, PT, aPTT, type and screen, etc.) on the first floor lab today if possible.      - Obtain an updated EKG if indicated by clearance.    - Continue current acetaminophen regimen.    - Advised daily proton pump inhibitor (e.g., omeprazole or esomeprazole) and/or antacids (Tums) to mitigate GI side effects.      - Refrain from introducing higher-potency opioids preoperatively to preserve postoperative analgesic options.    - Avoid heavy lifting, bending, or twisting until surgery.      - Maintain light walking or gentle activity to promote circulation.      - Monitor for any new neurological or GI-related symptoms. \"

## 2025-02-03 NOTE — TELEPHONE ENCOUNTER
Patient is requesting a refill.     Medication: Acetaminophen 500 mg     Date of last refill: 01/15/2025  [place]    Last office visit: 01/02/2025 with Dr. Crawford   Due back to clinic per last office note:  2 weeks follow up from 01/15/2025   Date next office visit scheduled:  02/06/2025 with Ivan Collier PA-C        Last OV note recommendation:     \"  - Proceed with minimally invasive left L4-5 microdiscectomy on 1/15/2025.      - Obtain final surgical clearance from PCP    - Complete the required laboratory tests (CBC with differential, CMP, PT, aPTT, type and screen, etc.) on the first floor lab today if possible.      - Obtain an updated EKG if indicated by clearance.    - Continue current acetaminophen regimen.    - Advised daily proton pump inhibitor (e.g., omeprazole or esomeprazole) and/or antacids (Tums) to mitigate GI side effects.      - Refrain from introducing higher-potency opioids preoperatively to preserve postoperative analgesic options.    - Avoid heavy lifting, bending, or twisting until surgery.      - Maintain light walking or gentle activity to promote circulation.      - Monitor for any new neurological or GI-related symptoms.     Kel Crawford MD  Neurological Surgery     [facility] [place]  [address] [place] [address], [address] [place] [address] [id number] [phone]  Pager [phone]  1/2/2025 12:04 PM \"

## 2025-02-07 DIAGNOSIS — Z98.890 S/P LUMBAR MICRODISCECTOMY: ICD-10-CM

## 2025-02-07 NOTE — TELEPHONE ENCOUNTER
Medication: methocarbamol 500 MG Oral Tab      Date of last refill: 1/15/25 (#60/0)  [place]      Last office visit: 1/02/25  Due back to clinic per last office note:    Date next office visit scheduled:    Future Appointments   Date Time Provider Department Center   2/12/2025 10:15 AM Ivan Collier PA-C [facility]   2/26/2025 11:45 AM Ivan Collier PA-C [facility]           Last OV note recommendation:    \"ASSESSMENT:  Mr. Hill continues to exhibit classic signs of left L4-5 disc herniation and associated radiculopathy. Conservative measures have failed to provide meaningful or lasting relief, and he reports worsening radicular pain. Despite his gastrointestinal sensitivities to analgesics, his condition remains best managed surgically at this stage. He is scheduled for a minimally invasive left L4-5 microdiscectomy on 1/15/2025. Preoperative labs and final medical clearance are to be completed prior to surgery. His overall medical status remains stable, with no new contraindications for proceeding.      PLAN:    - Proceed with minimally invasive left L4-5 microdiscectomy on 1/15/2025.      - Obtain final surgical clearance from PCP    - Complete the required laboratory tests (CBC with differential, CMP, PT, aPTT, type and screen, etc.) on the first floor lab today if possible.      - Obtain an updated EKG if indicated by clearance.    - Continue current acetaminophen regimen.    - Advised daily proton pump inhibitor (e.g., omeprazole or esomeprazole) and/or antacids (Tums) to mitigate GI side effects.      - Refrain from introducing higher-potency opioids preoperatively to preserve postoperative analgesic options.    - Avoid heavy lifting, bending, or twisting until surgery.      - Maintain light walking or gentle activity to promote circulation.      - Monitor for any new neurological or GI-related symptoms.\"    Routed to Provider.

## 2025-02-08 RX ORDER — METHOCARBAMOL 500 MG/1
500 TABLET, FILM COATED ORAL 3 TIMES DAILY PRN
Qty: 60 TABLET | Refills: 0 | Status: SHIPPED | OUTPATIENT
Start: 2025-02-08

## 2025-02-09 DIAGNOSIS — Z98.890 S/P LUMBAR MICRODISCECTOMY: ICD-10-CM

## 2025-02-10 NOTE — TELEPHONE ENCOUNTER
Medication:   oxyCODONE 5 MG Oral Tab 30 tablet 0 2/3/2025 --   Sig:   Take 1 tablet (5 mg total) by mouth every 4 (four) hours as needed.          Date of last refill: 2.3.2025 (#30/0)  [place]      Last office visit: 1.2.2025 pt had SX on 1.15.2025  Due back to clinic per last office note:    Date next office visit scheduled:  2.12.2025  Future Appointments   Date Time Provider Department Center   2/12/2025 10:15 AM Ivan Collier PA-C [facility]   2/26/2025 11:45 AM Ivan Collier PA-C [facility]           Last OV note recommendation:    \" PLAN:    - Proceed with minimally invasive left L4-5 microdiscectomy on 1/15/2025.      - Obtain final surgical clearance from PCP    - Complete the required laboratory tests (CBC with differential, CMP, PT, aPTT, type and screen, etc.) on the first floor lab today if possible.      - Obtain an updated EKG if indicated by clearance.    - Continue current acetaminophen regimen.    - Advised daily proton pump inhibitor (e.g., omeprazole or esomeprazole) and/or antacids (Tums) to mitigate GI side effects.      - Refrain from introducing higher-potency opioids preoperatively to preserve postoperative analgesic options.    - Avoid heavy lifting, bending, or twisting until surgery.      - Maintain light walking or gentle activity to promote circulation.      - Monitor for any new neurological or GI-related symptoms. \"          [Abdomen Masses] : No abdominal masses [Abdomen Tenderness] : ~T No ~M abdominal tenderness [Tender] : nontender [Normal rectal exam] : exam was normal [Manually Reducible] : a manually reducible (grade III) [Tender, Swollen] : tender, swollen [JVD] : no jugular venous distention  [Normal Breath Sounds] : Normal breath sounds [Normal Heart Sounds] : normal heart sounds [Normal Rate and Rhythm] : normal rate and rhythm [No Rash or Lesion] : No rash or lesion [Alert] : alert [Oriented to Person] : oriented to person [Oriented to Place] : oriented to place [Oriented to Time] : oriented to time [Calm] : calm [de-identified] : Looks well in no distress, of stated age. [de-identified] : pupils equal reactive to light normocephalic atraumatic. [de-identified] : moves all 4 extremities appropriately with 5 over 5 strength

## 2025-02-11 RX ORDER — OXYCODONE HYDROCHLORIDE 5 MG/1
5 TABLET ORAL EVERY 4 HOURS PRN
Qty: 30 TABLET | Refills: 0 | Status: SHIPPED | OUTPATIENT
Start: 2025-02-11 | End: 2025-02-12

## 2025-02-11 NOTE — PROGRESS NOTES
Established Neurosurgery Patient    Patient: Samuel Hill  Medical Record Number: [id number]  YOB: 1995  PCP: Leo Zhou MD    Reason for visit: 4-week postoperative visit    HISTORY OF PRESENTING ILLNESS:  Samuel Hill is a pleasant 29 year old male who returns to the neurosurgery clinic today approximately 4 weeks s/p MIS bilateral L4-5 laminal foraminotomies, microdiscectomy, approaching from the left, with Dr. Crawford on 1/15/2025.  The patient is recovering well from a surgical standpoint.  He continues to endorse low back pain that is well-controlled with medications.  He is taking Tylenol, Robaxin, and oxycodone.  He notes some intermittent pain in his left lower extremity, but states that it is not as severe as it was preoperatively.  No right lower extremity complaints.  No lower extremity weakness.  Denies any issues with his incision site.  No constitutional symptoms or symptoms concerning for sepsis.    Past Medical History:    Anxiety    Back pain    Back problem    Depression    History of stomach ulcers    Hx of motion sickness      Past Surgical History:   Procedure Laterality Date    Adenoidectomy      Appendectomy  03/2023    Appendectomy      Create eardrum opening,gen anesth      Other      ear tubes    Removal adenoids,primary,12+ y/o      Removal of tonsils,12+ y/o      and ademoids    Tonsillectomy        Family History   Problem Relation Age of Onset    Arthritis Mother     Diabetes Paternal Grandmother     Heart Disorder Maternal Grandmother     Heart Disorder Maternal Grandfather     Diabetes Maternal Grandfather       Social History     Socioeconomic History    Marital status: Single   Tobacco Use    Smoking status: Never     Passive exposure: Yes    Smokeless tobacco: Never   Vaping Use    Vaping status: Never Used   Substance and Sexual Activity    Alcohol use: Yes     Comment: weekends    Drug use: Yes     Frequency: 70.0 times per week     Types:  Cannabis   Other Topics Concern    Caffeine Concern No    Exercise No      Allergies[1]   Current Medications:  Current Outpatient Medications   Medication Sig Dispense Refill    oxyCODONE 5 MG Oral Tab Take 1 tablet (5 mg total) by mouth every 4 (four) hours as needed. 30 tablet 0    methocarbamol 500 MG Oral Tab Take 1 tablet (500 mg total) by mouth 3 (three) times daily as needed. 60 tablet 0    acetaminophen 500 MG Oral Tab Take 1 tablet (500 mg total) by mouth every 4 (four) hours as needed. 120 tablet 0    Multiple Vitamin (ONE-DAILY MULTI VITAMINS) Oral Tab Take 1 tablet by mouth daily.      Omega-3 Fatty Acids (FISH OIL OR) Take 1 capsule by mouth daily.      alprazolam 2 MG Oral Tab Take 1 tablet (2 mg total) by mouth As Directed.          REVIEW OF SYSTEMS:  Comprehensive review of systems completed and negative with the exception of aforementioned information in the HPI.     PHYSICAL EXAM:    2/12/2025     10:31 AM      /84   BP Location Right arm   Patient Position Sitting   Cuff Size adult   Pulse 72     Wt Readings from Last 6 Encounters:   01/15/25 299 lb (135.6 kg)   10/02/24 280 lb (127 kg)   09/24/24 165 lb (74.8 kg)   09/17/24 265 lb (120.2 kg)   09/11/24 265 lb (120.2 kg)   09/06/24 265 lb (120.2 kg)        General: Well developed, well nourished, in no acute distress.     Incision: Healing well.  Some Dermabond noted.  No erythema, warmth, swelling, gross drainage appreciated.    HEENT: Normocephalic, atraumatic.    Respirations: Non-labored     Neurologic / Musculoskeletal: Awake, alert, and interactive. Recent and remote memory appear intact. Attention span and concentration are appropriate. No dysarthria. Appropriately names objects. Coordination and motor control grossly intact. Patient follows commands briskly and appropriately.     Lumbar Spine:    Motor / Extremities   Hip   flexion Knee   extension Dorsiflexion Plantarflexion   Sensation   R 5/5 5/5 5/5 5/5 Intact to light touch    L 5/5 5/5 5/5 5/5 Intact to light touch       IMAGING:  No new neurosurgical imaging to review at this time    ASSESSMENT / PLAN:    ICD-10-CM   1. Postoperative state  Z98.890      2. Encounter for postoperative wound check  Z48.89      3. Acute left-sided low back pain with left-sided sciatica  M54.42      4. S/P lumbar microdiscectomy  Z98.890        Samuel Hill returns to the clinic today approximately 4 weeks s/p MIS bilateral L4-5 laminal foraminotomies, microdiscectomy, approaching from the left, with Dr. Crawford on 1/15/2025.  The patient is recovering as expected from a surgical standpoint.  He has significant improvement in his preoperative radiculopathy.  He notes some expected back soreness that is well-controlled with pain medications.  He was reminded of the postoperative restrictions.  He can lift up to 20 pounds for the next month or so.  He should continue to avoid any excessive bending, lifting, or twisting.  He states that his pharmacy was out of oxycodone and [facility] [place] that [facility] would [facility] the prescription be [facility].  [facility]  Last prescription was dispensed on 2/30/25 and he is appropriate for refill.    -Medications Prescribed: Oxy sent to alternative pharmacy  -Imaging Ordered: None  -Referrals Placed: None  -Follow up: 2/26/2025 at 1145    Plan was reviewed and discussed in detail with the patient. Patient encouraged to call the office with any questions or concerns of new/worsening neurologic symptoms. Patient demonstrated good understanding and was agreeable with the plan.     Visit time: 15 minutes   Over 50% of that time was spent providing patient education and discussing care plan.    Ivan Collier PA-C  Physician Assistant- Neurosurgery   [facility]  2/11/2025, 9:42 AM               [1] No Known Allergies

## 2025-02-12 ENCOUNTER — OFFICE VISIT (OUTPATIENT)
Dept: SURGERY | Facility: CLINIC | Age: 30
End: 2025-02-12
Payer: MEDICAID

## 2025-02-12 ENCOUNTER — TELEPHONE (OUTPATIENT)
Dept: SURGERY | Facility: CLINIC | Age: 30
End: 2025-02-12

## 2025-02-12 VITALS — SYSTOLIC BLOOD PRESSURE: 140 MMHG | DIASTOLIC BLOOD PRESSURE: 84 MMHG | HEART RATE: 72 BPM

## 2025-02-12 DIAGNOSIS — M54.42 ACUTE LEFT-SIDED LOW BACK PAIN WITH LEFT-SIDED SCIATICA: ICD-10-CM

## 2025-02-12 DIAGNOSIS — Z98.890 POSTOPERATIVE STATE: Primary | ICD-10-CM

## 2025-02-12 DIAGNOSIS — Z48.89 ENCOUNTER FOR POSTOPERATIVE WOUND CHECK: ICD-10-CM

## 2025-02-12 DIAGNOSIS — Z98.890 S/P LUMBAR MICRODISCECTOMY: ICD-10-CM

## 2025-02-12 PROCEDURE — 99024 POSTOP FOLLOW-UP VISIT: CPT | Performed by: STUDENT IN AN ORGANIZED HEALTH CARE EDUCATION/TRAINING PROGRAM

## 2025-02-12 RX ORDER — OXYCODONE HYDROCHLORIDE 5 MG/1
5 TABLET ORAL EVERY 4 HOURS PRN
Qty: 30 TABLET | Refills: 0 | Status: SHIPPED | OUTPATIENT
Start: 2025-02-12

## 2025-02-12 NOTE — PROGRESS NOTES
Established patient:  Reason for follow up: post op   Minimally invasive bilateral Lumbar 4 to 5 laminoforaminotomies, microdiscectomy, approaching from the left Bilateral       Numeric Rating Scale:      Pain at Present:  5/10       Distribution of Pain:    bilateral    Most recent treatments for Current Pain Condition:   Surgery  Response to treatment: some relief

## 2025-02-12 NOTE — TELEPHONE ENCOUNTER
Message below noted.    Noted Providers sent refill to requested Pharmacy provided in message.    Advised patient of message and to reach back out with any other questions or concerns.    Patient is active on MyChart.  message sent.

## 2025-02-12 NOTE — TELEPHONE ENCOUNTER
Patient contacted the office for refill request of oxyCODONE 5 MG Oral Tab. Patient stated several pharmacy's he's contacted are out of stock. He contacted [facility] in [place] that has available stock.   To be sent to [facility] [address] [place] [id number] [phone]

## 2025-02-19 DIAGNOSIS — Z98.890 S/P LUMBAR MICRODISCECTOMY: ICD-10-CM

## 2025-02-19 RX ORDER — OXYCODONE HYDROCHLORIDE 5 MG/1
5 TABLET ORAL EVERY 4 HOURS PRN
Qty: 30 TABLET | Refills: 0 | OUTPATIENT
Start: 2025-02-19

## 2025-02-19 NOTE — TELEPHONE ENCOUNTER
Medication:   oxyCODONE 5 MG Oral Tab 30 tablet 0 2/12/2025 --   Sig:   Take 1 tablet (5 mg total) by mouth every 4 (four) hours as needed.          Date of last refill: 2.12.2025 (#30/0)  [place]      Last office visit: 2.12.2025  Due back to clinic per last office note:    Date next office visit scheduled:  2.26.2025  Future Appointments   Date Time Provider Department Center   2/26/2025 11:45 AM Ivan Collier PA-C [facility]           Last OV note recommendation:    \" ASSESSMENT / PLAN:      ICD-10-CM   1. Postoperative state  Z98.890       2. Encounter for postoperative wound check  Z48.89       3. Acute left-sided low back pain with left-sided sciatica  M54.42       4. S/P lumbar microdiscectomy  Z98.890          Samuel Bustillossylviaezequiel returns to the clinic today approximately 4 weeks s/p MIS bilateral L4-5 laminal foraminotomies, microdiscectomy, approaching from the left, with Dr. Crawford on 1/15/2025.  The patient is recovering as expected from a surgical standpoint.  He has significant improvement in his preoperative radiculopathy.  He notes some expected back soreness that is well-controlled with pain medications.  He was reminded of the postoperative restrictions.  He can lift up to 20 pounds for the next month or so.  He should continue to avoid any excessive bending, lifting, or twisting.  He states that his pharmacy was out of oxycodone and [facility] [place] that he would prefer the prescription be sent.  [place].  Last prescription was dispensed on 2/30/25 and he is appropriate for refill.     -Medications Prescribed: Oxy sent to alternative pharmacy  -Imaging Ordered: None  -Referrals Placed: None  -Follow up: 2/26/2025 at 1145 \"

## 2025-02-26 ENCOUNTER — OFFICE VISIT (OUTPATIENT)
Dept: SURGERY | Facility: CLINIC | Age: 30
End: 2025-02-26
Payer: MEDICAID

## 2025-02-26 VITALS
HEART RATE: 95 BPM | OXYGEN SATURATION: 97 % | HEIGHT: 74 IN | BODY MASS INDEX: 38.37 KG/M2 | DIASTOLIC BLOOD PRESSURE: 92 MMHG | WEIGHT: 299 LBS | SYSTOLIC BLOOD PRESSURE: 144 MMHG

## 2025-02-26 DIAGNOSIS — Z98.890 S/P LUMBAR MICRODISCECTOMY: Primary | ICD-10-CM

## 2025-02-26 DIAGNOSIS — M54.16 LUMBAR RADICULITIS: ICD-10-CM

## 2025-02-26 PROCEDURE — 99024 POSTOP FOLLOW-UP VISIT: CPT | Performed by: STUDENT IN AN ORGANIZED HEALTH CARE EDUCATION/TRAINING PROGRAM

## 2025-02-26 RX ORDER — GABAPENTIN 300 MG/1
300 CAPSULE ORAL 2 TIMES DAILY
COMMUNITY
End: 2025-02-26

## 2025-02-26 RX ORDER — HYDROCODONE BITARTRATE AND ACETAMINOPHEN 5; 325 MG/1; MG/1
1 TABLET ORAL EVERY 6 HOURS PRN
Qty: 30 TABLET | Refills: 0 | Status: SHIPPED | OUTPATIENT
Start: 2025-02-26

## 2025-02-26 RX ORDER — PREGABALIN 100 MG/1
100 CAPSULE ORAL 2 TIMES DAILY
Qty: 60 CAPSULE | Refills: 1 | Status: SHIPPED | OUTPATIENT
Start: 2025-02-26

## 2025-02-26 NOTE — PROGRESS NOTES
Established Neurosurgery Patient    Patient: Samuel Hill  Medical Record Number: [id number]  YOB: 1995  PCP: Leo Zhou MD    Reason for visit: 4-week postoperative visit    HISTORY OF PRESENTING ILLNESS:  Samuel Hill is a pleasant 29 year old male who returns to the neurosurgery clinic today approximately 6 weeks s/p MIS bilateral L4-5 laminal foraminotomies, microdiscectomy, approaching from the left, with Dr. Crawford on 1/15/2025.  The patient continues to recover well from a surgical standpoint.  He notes intermittent low back pain that is fairly well-controlled with medications.  He notes that his left side hurts more when it is cold and his right side hurts more when it is warm.  At night, he notes tightness in his legs that began in his thighs and radiates superiorly to his back.  He states that this causes him to lose sleep.  He otherwise has no new neurologic complaints.  Denies any issues with his incision site.  Unfortunately, he feels that he developed a cold.  He reports that his whole family is currently sick.  He endorses constitutional symptoms and is planning to go to the urgent care today.    Past Medical History:    Anxiety    Back pain    Back problem    Depression    History of stomach ulcers    Hx of motion sickness      Past Surgical History:   Procedure Laterality Date    Adenoidectomy      Appendectomy  03/2023    Appendectomy      Create eardrum opening,gen anesth      Other      ear tubes    Removal adenoids,primary,12+ y/o      Removal of tonsils,12+ y/o      and ademoids    Tonsillectomy        Family History   Problem Relation Age of Onset    Arthritis Mother     Diabetes Paternal Grandmother     Heart Disorder Maternal Grandmother     Heart Disorder Maternal Grandfather     Diabetes Maternal Grandfather       Social History     Socioeconomic History    Marital status: Single   Tobacco Use    Smoking status: Never     Passive exposure: Yes     Smokeless tobacco: Never   Vaping Use    Vaping status: Never Used   Substance and Sexual Activity    Alcohol use: Yes     Comment: weekends    Drug use: Yes     Frequency: 70.0 times per week     Types: Cannabis   Other Topics Concern    Caffeine Concern No    Exercise No      Allergies[1]   Current Medications:  Current Outpatient Medications   Medication Sig Dispense Refill    pregabalin 100 MG Oral Cap Take 1 capsule (100 mg total) by mouth 2 (two) times daily. 60 capsule 1    HYDROcodone-acetaminophen 5-325 MG Oral Tab Take 1 tablet by mouth every 6 (six) hours as needed for Pain. 30 tablet 0    methylPREDNISolone (MEDROL) 4 MG Oral Tablet Therapy Pack Take as directed 21 tablet 0    methocarbamol 500 MG Oral Tab Take 1 tablet (500 mg total) by mouth 3 (three) times daily as needed. 60 tablet 0    acetaminophen 500 MG Oral Tab Take 1 tablet (500 mg total) by mouth every 4 (four) hours as needed. 120 tablet 0    Multiple Vitamin (ONE-DAILY MULTI VITAMINS) Oral Tab Take 1 tablet by mouth daily.      Omega-3 Fatty Acids (FISH OIL OR) Take 1 capsule by mouth daily.      alprazolam 2 MG Oral Tab Take 1 tablet (2 mg total) by mouth As Directed.          REVIEW OF SYSTEMS:  Comprehensive review of systems completed and negative with the exception of aforementioned information in the HPI.     PHYSICAL EXAM:    2/26/2025     11:30 AM      /92BP. 144/92. Data is abnormal. Taken on 2/26/25 11:30 AM   Pulse 95   Weight 299 lb (135.6 kg)   Height INCHES 74\"   SpO2 97 %   Pain Score 8 - (Severe)Pain Score. 8 - (Severe). The comment is RLS bilateral going up spine to neck espcially at night; would like to switch hydrocodone because he states the pain rleief lasteds longer than oxycodone. Taken on 2/26/25 11:30 AM   Pain Loc BackPain Loc. Back. The comment is bilateral back pain but after picking up dog his left tightness and sharp pain. 60 lbs - pitbull breed. Taken on 2/26/25 11:30 AM       Wt Readings from Last 6  Encounters:   02/26/25 299 lb (135.6 kg)   01/15/25 299 lb (135.6 kg)   10/02/24 280 lb (127 kg)   09/24/24 165 lb (74.8 kg)   09/17/24 265 lb (120.2 kg)   09/11/24 265 lb (120.2 kg)        General: Well developed, well nourished, in no acute distress.     Incision: Healing well.  Some Dermabond noted.  No erythema, warmth, swelling, gross drainage appreciated.    HEENT: Normocephalic, atraumatic.    Respirations: Non-labored     Neurologic / Musculoskeletal: Awake, alert, and interactive. Recent and remote memory appear intact. Attention span and concentration are appropriate. No dysarthria. Appropriately names objects. Coordination and motor control grossly intact. Patient follows commands briskly and appropriately.     Lumbar Spine:    Motor / Extremities   Hip   flexion Knee   extension Dorsiflexion Plantarflexion   Sensation   R 5/5 5/5 5/5 5/5 Intact to light touch   L 5/5 5/5 5/5 5/5 Intact to light touch       IMAGING:  No new neurosurgical imaging to review at this time    ASSESSMENT / PLAN:    ICD-10-CM   1. Postoperative state  Z98.890      2. Encounter for postoperative wound check  Z48.89      3. Acute left-sided low back pain with left-sided sciatica  M54.42      4. S/P lumbar microdiscectomy  Z98.890        Samuel Hill returns to the clinic today approximately 6 weeks s/p MIS bilateral L4-5 laminal foraminotomies, microdiscectomy, approaching from the left, with Dr. Crawford on 1/15/2025.  The patient is recovering as expected from a surgical standpoint.  He has significant improvement in his preoperative radiculopathy. He should continue to avoid any excessive bending, lifting, or twisting.  He does not feel that the oxycodone helps much, but has good relief with Norco in the past.  Will plan to prescribe Norco and discontinue the oxycodone.  Given some of his persistent musculoskeletal pain, as well as the sensations that he feels in his legs traveling up his back, will also initiate Lyrica  to address some suspected radiculitis.    -Medications Prescribed: Norco, discontinue oxycodone, Lyrica  -Imaging Ordered: None  -Referrals Placed: None  -Follow up: 6 weeks with Dr. Crawford for his 3-month postoperative visit    Plan was reviewed and discussed in detail with the patient. Patient encouraged to call the office with any questions or concerns of new/worsening neurologic symptoms. Patient demonstrated good understanding and was agreeable with the plan.     Visit time: 15 minutes   Over 50% of that time was spent providing patient education and discussing care plan.    Ivan Collier PA-C  Physician Assistant- Neurosurgery   2/26/2025, 2:41 PM                 [1] No Known Allergies

## 2025-03-02 ENCOUNTER — PATIENT MESSAGE (OUTPATIENT)
Dept: SURGERY | Facility: CLINIC | Age: 30
End: 2025-03-02

## 2025-03-03 ENCOUNTER — PATIENT MESSAGE (OUTPATIENT)
Dept: SURGERY | Facility: CLINIC | Age: 30
End: 2025-03-03

## 2025-03-03 DIAGNOSIS — M54.16 LUMBAR RADICULITIS: ICD-10-CM

## 2025-03-03 DIAGNOSIS — Z98.890 S/P LUMBAR MICRODISCECTOMY: ICD-10-CM

## 2025-03-03 NOTE — TELEPHONE ENCOUNTER
This TE has been added to next [employer] message that was sent by patient yesterday, 3.2.25 for continuity of care. This TE is no longer in Nursing pool.

## 2025-03-03 NOTE — TELEPHONE ENCOUNTER
Noted that patient has sent two separate messages with a condition update and is seeking advice.     For continuity of care, previous message that was sent today has been added to this TE:    \"Hey mr piper i thought the hydrocodone helped more but i think i was weong can i change back to the oxycodone cause with this weather and how its been feeling the hydrocodones arent doing much im haven to wake up in the night to take em cause i cant sleep cause my back is so tigh and restless also tryed. And trying the gabalin but still haven those spasmes up my legs and back\"    Patient underwent surgery on 1.15.25 with Dr. Crawford:    Minimally invasive bilateral Lumbar 4 to 5 laminoforaminotomies, microdiscectomy, approaching from the left     Patient states in his message that:    -while exiting the chair, he did not pay attention to his position and \"forgot to roll\".   -he then heard a \"popping noise\".   -now he is feeling tightness and pain along with soreness.   -he would like to know if he needs to be assessed.   -pain is aggravated X 3 days.   In above message, patient is asking:  -if pain med to be changed to oxycodone, as Norco is not assisting in managing the pain.   -he wakes up at night in pain.   -he is trying Lyrica.    Per THOMAS Singh at office visit on 2.26.25:    \"6 weeks s/p MIS bilateral L4-5 laminal foraminotomies, microdiscectomy, approaching from the left, with Dr. Crawford on 1/15/2025.  The patient is recovering as expected from a surgical standpoint.  He has significant improvement in his preoperative radiculopathy. He should continue to avoid any excessive bending, lifting, or twisting.  He does not feel that the oxycodone helps much, but has good relief with Norco in the past.  Will plan to prescribe Norco and discontinue the oxycodone.  Given some of his persistent musculoskeletal pain, as well as the sensations that he feels in his legs traveling up his back, will also initiate Lyrica to address  some suspected radiculitis.     -Medications Prescribed: Norco, discontinue oxycodone, Lyrica  -Imaging Ordered: None  -Referrals Placed: None  -Follow up: 6 weeks with Dr. Crawford for his 3-month postoperative visit\"    Routed to THOMAS Singh.

## 2025-03-03 NOTE — TELEPHONE ENCOUNTER
Received update that provider has contacted patient regarding:    -he will switch pain meds to oxycodone, but script cannot be filled until 3.5.25.   -he has ordered a CT of the lumbar spine for patient to obtain and review as soon as possible to assess for any fractures.     Patient replied asking for advice on timing of CT. Response given via [email or web address] message, asking patient to call CT today to schedule and to follow up with THOMAS Romero in the office.

## 2025-03-12 ENCOUNTER — PATIENT MESSAGE (OUTPATIENT)
Dept: SURGERY | Facility: CLINIC | Age: 30
End: 2025-03-12

## 2025-03-12 DIAGNOSIS — Z98.890 S/P LUMBAR MICRODISCECTOMY: ICD-10-CM

## 2025-03-12 DIAGNOSIS — M54.16 LUMBAR RADICULITIS: ICD-10-CM

## 2025-03-12 RX ORDER — HYDROCODONE BITARTRATE AND ACETAMINOPHEN 5; 325 MG/1; MG/1
1 TABLET ORAL EVERY 6 HOURS PRN
Qty: 30 TABLET | Refills: 0 | OUTPATIENT
Start: 2025-03-12

## 2025-03-12 RX ORDER — HYDROCODONE BITARTRATE AND ACETAMINOPHEN 5; 325 MG/1; MG/1
1 TABLET ORAL EVERY 6 HOURS PRN
Qty: 30 TABLET | Refills: 0 | Status: CANCELLED | OUTPATIENT
Start: 2025-03-12

## 2025-03-12 RX ORDER — OXYCODONE HYDROCHLORIDE 5 MG/1
5 TABLET ORAL EVERY 4 HOURS PRN
Qty: 30 TABLET | Refills: 0 | Status: SHIPPED | OUTPATIENT
Start: 2025-03-12

## 2025-03-12 NOTE — TELEPHONE ENCOUNTER
Noted that patient has messaged, clarifying that he is asking for pain medication refill:    Medication Quantity Refills Start End   oxyCODONE 5 MG Oral Tab (Discontinued) 30 tablet 0 2/20/2025 2/26/2025   Sig:   Take 1 tablet (5 mg total) by mouth every 4 (four) hours as needed for Pain.     Route:   Oral     PRN Reason(s):   Pain     Reason for Discontinue:   Physician directed     Earliest Fill Date:   2/20/2025         Date of last refill: 2.20.25  [place]25    Last office visit: 2.26.25  Due back to clinic per last office note:  6 weeks  Date next office visit scheduled:  currently, no future office visit is scheduled.     Last OV note recommendation per THOMAS Singh:     \"-Medications Prescribed: Norco, discontinue oxycodone, Lyrica  -Imaging Ordered: None  -Referrals Placed: None  -Follow up: 6 weeks with Dr. Crawford for his 3-month postoperative visit\"    Routed to THOMAS Singh.

## 2025-03-12 NOTE — TELEPHONE ENCOUNTER
Message below noted.    Please see other TE from 3/12/25.    Nothing needed further with this encounter.

## 2025-03-12 NOTE — TELEPHONE ENCOUNTER
Please see other TE from today 3/12/25.    Patient would like Oxycodone refill, but was not given that option.     Medication: oxyCODONE 5 MG Oral Tab      Date of last refill: 2/20/25 (#30/0)  [place]      Last office visit: 2/26/25  Due back to clinic per last office note:  6 weeks  Date next office visit scheduled:    No future appointments.        Last OV note recommendation:    \"Samuel Hill returns to the clinic today approximately 6 weeks s/p MIS bilateral L4-5 laminal foraminotomies, microdiscectomy, approaching from the left, with Dr. Crawford on 1/15/2025.  The patient is recovering as expected from a surgical standpoint.  He has significant improvement in his preoperative radiculopathy. He should continue to avoid any excessive bending, lifting, or twisting.  He does not feel that the oxycodone helps much, but has good relief with Norco in the past.  Will plan to prescribe Norco and discontinue the oxycodone.  Given some of his persistent musculoskeletal pain, as well as the sensations that he feels in his legs traveling up his back, will also initiate Lyrica to address some suspected radiculitis.     -Medications Prescribed: Norco, discontinue oxycodone, Lyrica  -Imaging Ordered: None  -Referrals Placed: None  -Follow up: 6 weeks with Dr. Crawford for his 3-month postoperative visit\"    Routed to Provider.

## 2025-03-12 NOTE — TELEPHONE ENCOUNTER
Message received from patient.    Patient states he will call next time.     Advised patient to let us know if there is anything else we can help him with.

## 2025-03-12 NOTE — TELEPHONE ENCOUNTER
Message below noted.    Sent updated refill request to Provider.     Advised patient of message and to request refill through calling our office or sending a [employer] message instead of picking a different medication.

## 2025-03-12 NOTE — TELEPHONE ENCOUNTER
Medication:   HYDROcodone-acetaminophen 5-325 MG Oral Tab 30 tablet 0 2/26/2025 --   Sig:   Take 1 tablet by mouth every 6 (six) hours as needed for Pain.          Date of last refill: 2.26.2025 (#30/0)  [place]      Last office visit: 2.26.2025  Due back to clinic per last office note:  6 weeks   Date next office visit scheduled:    No future appointments.        Last OV note recommendation:       \" -Medications Prescribed: Norco, discontinue oxycodone, Lyrica  -Imaging Ordered: None  -Referrals Placed: None  -Follow up: 6 weeks with Dr. Crawford for his 3-month postoperative visit \"

## 2025-03-17 DIAGNOSIS — M54.16 LUMBAR RADICULITIS: ICD-10-CM

## 2025-03-17 DIAGNOSIS — Z98.890 S/P LUMBAR MICRODISCECTOMY: ICD-10-CM

## 2025-03-18 RX ORDER — OXYCODONE HYDROCHLORIDE 5 MG/1
5 TABLET ORAL EVERY 4 HOURS PRN
Qty: 30 TABLET | Refills: 0 | Status: SHIPPED | OUTPATIENT
Start: 2025-03-18

## 2025-03-18 NOTE — TELEPHONE ENCOUNTER
Medication: oxyCODONE 5 MG Oral Tab      Date of last refill: 3/12/25 (#30/0)  [place]/25     Last office visit: 2/26/25  Due back to clinic per last office note:  6 weeks  Date next office visit scheduled:    No future appointments.        Last OV note recommendation:    \"Samuel Hill returns to the clinic today approximately 6 weeks s/p MIS bilateral L4-5 laminal foraminotomies, microdiscectomy, approaching from the left, with Dr. Crawford on 1/15/2025.  The patient is recovering as expected from a surgical standpoint.  He has significant improvement in his preoperative radiculopathy. He should continue to avoid any excessive bending, lifting, or twisting.  He does not feel that the oxycodone helps much, but has good relief with Norco in the past.  Will plan to prescribe Norco and discontinue the oxycodone.  Given some of his persistent musculoskeletal pain, as well as the sensations that he feels in his legs traveling up his back, will also initiate Lyrica to address some suspected radiculitis.     -Medications Prescribed: Norco, discontinue oxycodone, Lyrica  -Imaging Ordered: None  -Referrals Placed: None  -Follow up: 6 weeks with Dr. Crawford for his 3-month postoperative visit\"    Routed to Provider.

## 2025-03-30 DIAGNOSIS — Z98.890 S/P LUMBAR MICRODISCECTOMY: ICD-10-CM

## 2025-03-30 DIAGNOSIS — M54.16 LUMBAR RADICULITIS: ICD-10-CM

## 2025-03-30 RX ORDER — OXYCODONE HYDROCHLORIDE 5 MG/1
5 TABLET ORAL EVERY 4 HOURS PRN
Qty: 30 TABLET | Refills: 0 | Status: CANCELLED | OUTPATIENT
Start: 2025-03-30

## 2025-03-31 ENCOUNTER — PATIENT MESSAGE (OUTPATIENT)
Dept: SURGERY | Facility: CLINIC | Age: 30
End: 2025-03-31

## 2025-03-31 ENCOUNTER — TELEPHONE (OUTPATIENT)
Dept: CASE MANAGEMENT | Age: 30
End: 2025-03-31

## 2025-03-31 ENCOUNTER — PATIENT MESSAGE (OUTPATIENT)
Dept: CASE MANAGEMENT | Age: 30
End: 2025-03-31

## 2025-03-31 DIAGNOSIS — M54.16 LUMBAR RADICULITIS: ICD-10-CM

## 2025-03-31 DIAGNOSIS — Z98.890 S/P LUMBAR MICRODISCECTOMY: Primary | ICD-10-CM

## 2025-03-31 DIAGNOSIS — M51.16 LUMBAR DISC HERNIATION WITH RADICULOPATHY: Primary | ICD-10-CM

## 2025-03-31 RX ORDER — OXYCODONE HYDROCHLORIDE 5 MG/1
5 TABLET ORAL EVERY 4 HOURS PRN
Qty: 30 TABLET | Refills: 0 | Status: SHIPPED | OUTPATIENT
Start: 2025-03-31 | End: 2025-03-31

## 2025-03-31 RX ORDER — OXYCODONE HYDROCHLORIDE 5 MG/1
5 TABLET ORAL EVERY 4 HOURS PRN
Qty: 30 TABLET | Refills: 0 | Status: SHIPPED | OUTPATIENT
Start: 2025-03-31 | End: 2025-04-07

## 2025-03-31 NOTE — TELEPHONE ENCOUNTER
Per pt request, prescription sent to [facility] cancelled. Spoke to Wallace at [facility] confirmed cancellation. Re-pended for provider to send to [facility].

## 2025-03-31 NOTE — TELEPHONE ENCOUNTER
CT Lumbar Spine        Status: DENIED        Reference number [id number]     A copy of the denial letter is filed under the MEDIA tab, reference for complete details. You may reach out to Naz at [phone]  OPT # 4 to discuss decision.     Please reach out to patient with plan of care.   Appointment 4/2      Thank you    Reason  Imaging must be needed for one of these reasons.   An x-ray shows a new fracture in your spine.   You have had severe back pain for over a week and your x-ray did not find the source  of your problem.   Your x-ray did not show how severe your spinal fracture is.   To plan a surgery following a known fracture caused by compressed bones. The  details sent to us do not show any of these reasons.  Further imaging can be done when your provider has results of a plain x-ray taken after  your symptoms started or changed that sup

## 2025-03-31 NOTE — TELEPHONE ENCOUNTER
Patient has replied that his pain is in the back on the left side near incision and down the left leg. He states pain is a 10/10 intermittently with the weather.     Medication: oxycodone 5mg Q4hr PRN     Date of last refill: 3/25/25 (#30/0 R)       Last office visit: 2/26/25  Due back to clinic per last office note:  6 weeks  Date next office visit scheduled:    Future Appointments   Date Time Provider Department Center   4/2/2025  8:00 AM [facility] CT RM1 [facility] CT SCAN EM [facility]   4/7/2025  9:00 AM Kel rCawford MD [facility] NEURSUR[facility]

## 2025-03-31 NOTE — TELEPHONE ENCOUNTER
Medication:   oxyCODONE 5 MG Oral Tab 30 tablet 0 3/25/2025 --   Sig:   Take 1 tablet (5 mg total) by mouth every 4 (four) hours as needed for Pain.          Date of last refill: 3.25.2025 (#30/0)  [facility]      Last office visit: 2.26.2025  Due back to clinic per last office note:  6 weeks   Date next office visit scheduled:  4.7.2025  Future Appointments   Date Time Provider Department Center   4/2/2025  8:00 AM [facility] CT [facility]1 [facility] SCAN [facility]   4/7/2025  9:00 AM Kel Crawford MD [facility] NEURSURG [place] [facility]           Last OV note recommendation:      \" -Medications Prescribed: Norco, discontinue oxycodone, Lyrica  -Imaging Ordered: None  -Referrals Placed: None  -Follow up: 6 weeks with Dr. Crawford for his 3-month postoperative visit '

## 2025-03-31 NOTE — TELEPHONE ENCOUNTER
Patient requesting oxycodone refill.     S/p MIS bilateral L4-5 laminal foraminotomies, microdiscectomy, approaching from the left, with Dr. Crawford on 1/15/2025.     LOV 2/26/25 with Joshua GUZMAN    Next visit 4/7 with Dr Fortune and CT lumbar scheduled for 4/2 to further evaluate due to continued c/o pain in low back.    Before routing to the provider, have asked patient to describe pain and its features in more detail.   Evidence of preeclampsia  Discussed findings with Dr. rKuse  Plan of admission to L&D for blood pressure monitoring and continuous fetal heart monitoring   -MFM consult   -Routine, 24hr urine, GBS culture and COVID   -Betamethasone for fetal lung maturity  -Procardia IR 10mg ordered at   -Procardia XL 30mg   -Magnesium sulfate infusion for seizure prophylaxis  Evidence of preeclampsia  Discussed findings with Dr. Kruse  Plan of admission to L&D for blood pressure monitoring and continuous fetal heart monitoring   -MFM consult   -Routine, 24hr urine, GBS culture and COVID   -Betamethasone for fetal lung maturity  -Procardia IR 10mg ordered at 1:53am  -Procardia XL 30mg   -Magnesium sulfate infusion for seizure prophylaxis  Evidence of severe preeclampsia  Discussed findings with Dr. Kruse  Plan of admission to L&D for blood pressure monitoring and continuous fetal heart monitoring   -MFM consult   -Routine, 24hr urine, GBS culture and COVID   -Betamethasone for fetal lung maturity  -Procardia IR 10mg ordered at 1:53am  -Procardia XL 30mg   -Magnesium sulfate infusion for seizure prophylaxis

## 2025-04-01 NOTE — TELEPHONE ENCOUNTER
Noted that patient's CT has been denied.     [facility] Future Appointments    Encounter Information   Provider Department Center   4/2/2025 8:00 AM [facility] CT RM1 [facility] CT [facility]   4/7/2025 9:00 AM Kel Crawford MD       Patient underwent surgery with Dr. Crawford on 1.15.25:    Minimally invasive bilateral Lumbar 4 to 5 laminoforaminotomies, microdiscectomy, approaching from the left     In researching patient's chart, patient reported new pain on 3.2.25. THOMAS Singh ordered oxycodone and a CT of the lumbar spine for patient reported aggravated pain after exiting a chair.      Called [employer] and spoke with RN reviewer Carolee who stated that CT denial will not be overturned until patient obtains an X-ray.     Per Carolee RN:  [employer] has 7 calendar days post denial for reconsideration options such as peer to peer, request for coverage in writing, a call from [facility] nurse to discuss findings, or a call from a provider after X-ray is completed.     Routed to LUIS ALFREDO LaComunity.

## 2025-04-01 NOTE — TELEPHONE ENCOUNTER
Notified pt that insurance is requiring xray lumbar spine prior to approving CT lumbar spine.     Sent message advising him to schedule xray and cancel Ct appt.

## 2025-04-04 ENCOUNTER — PATIENT MESSAGE (OUTPATIENT)
Dept: SURGERY | Facility: CLINIC | Age: 30
End: 2025-04-04

## 2025-04-04 DIAGNOSIS — Z98.890 S/P LUMBAR MICRODISCECTOMY: ICD-10-CM

## 2025-04-04 DIAGNOSIS — M54.16 LUMBAR RADICULITIS: ICD-10-CM

## 2025-04-04 RX ORDER — OXYCODONE HYDROCHLORIDE 5 MG/1
5 TABLET ORAL EVERY 4 HOURS PRN
Qty: 30 TABLET | Refills: 0 | OUTPATIENT
Start: 2025-04-04 | End: 2025-04-11

## 2025-04-04 NOTE — TELEPHONE ENCOUNTER
Medication: oxycodone 5mg Q4hr PRN     Date of last refill: 3/31/25 (#30/0 R)  [place]25     Last office visit: 2/26/25    Date next office visit scheduled:    Future Appointments   Date Time Provider Department Center   4/7/2025  9:00 AM Kel Crawford MD [facility] NEURS[facility]        Patient is completing xray lumbar today for 4/7 appt. Previously ordered CT lumbar denied by insurance.

## 2025-04-04 NOTE — TELEPHONE ENCOUNTER
Noted patient has messaged about \"acl/mcl\" pain, worse with the weather.     He is asking for pain medication refill- denied earlier today by PA.     Noted pt did not show to xray appt this morning as scheduled. Next OV 4/7 with Dr Crawford.       Sent reply regarding above- advised to contact PCP about knee pain and inquired about xrays.

## 2025-04-07 ENCOUNTER — TELEPHONE (OUTPATIENT)
Dept: SURGERY | Facility: CLINIC | Age: 30
End: 2025-04-07

## 2025-04-07 NOTE — TELEPHONE ENCOUNTER
This writer assisted the patient in obtaining a new appointment for CT L-Spine. The original appointment was canceled on 4/2/25 due to insurance denial. Routing communication to our [employer] team for appeal process.

## 2025-04-07 NOTE — TELEPHONE ENCOUNTER
Medication: oxycodone 5 mg Q4 #30    Date of last refill: 3-31-25  [place]     Last office visit: 2-26-25  Due back to clinic per last office note:  12 week post op  Date next office visit scheduled:  4-17-25        Last OV note recommendation:    Samuel Hill returns to the clinic today approximately 6 weeks s/p MIS bilateral L4-5 laminal foraminotomies, microdiscectomy, approaching from the left, with Dr. Crawford on 1/15/2025.  The patient is recovering as expected from a surgical standpoint.  He has significant improvement in his preoperative radiculopathy. He should continue to avoid any excessive bending, lifting, or twisting.  He does not feel that the oxycodone helps much, but has good relief with Norco in the past.  Will plan to prescribe Norco and discontinue the oxycodone.  Given some of his persistent musculoskeletal pain, as well as the sensations that he feels in his legs traveling up his back, will also initiate Lyrica to address some suspected radiculitis.     -Medications Prescribed: Norco, discontinue oxycodone, Lyrica  -Imaging Ordered: None  -Referrals Placed: None  -Follow up: 6 weeks with Dr. Crawford for his 3-month postoperative visit

## 2025-04-07 NOTE — TELEPHONE ENCOUNTER
Patient requesting to speak with clinical staff regarding the denied medication refill request. Patient states he is in pain and out of current medication.     Additionally, this writer is assisting the patient with getting the CT Lumbar Spine rescheduled with central scheduling to appeal the denied and canceled 4/2/25 appointment.     Please contact and advise.

## 2025-04-09 NOTE — TELEPHONE ENCOUNTER
VIN Cartwright I Contacted the patient and assisted him with rescheduling the XR. To be completed tomorrow, 4/10/25, at 8:30 am; due to work restraints unable to be completed today, 4/9/25.

## 2025-05-02 ENCOUNTER — HOSPITAL ENCOUNTER (OUTPATIENT)
Dept: GENERAL RADIOLOGY | Age: 30
Discharge: HOME | End: 2025-05-02
Attending: PHYSICIAN ASSISTANT
Payer: MEDICAID

## 2025-05-02 ENCOUNTER — PATIENT MESSAGE (OUTPATIENT)
Dept: SURGERY | Facility: CLINIC | Age: 30
End: 2025-05-02

## 2025-05-02 PROCEDURE — 72114 X-RAY EXAM L-S SPINE BENDING: CPT | Performed by: PHYSICIAN ASSISTANT

## 2025-05-02 NOTE — TELEPHONE ENCOUNTER
Message below noted.    Please see previous TE's from 4/07/25 and 3/31/25.     Patient requesting new XR order as he attempted to get done yesterday, but experienced car difficulties.     Patient hoping to get done today or later as his back where surgery was done is swollen and in pain again.    Patient requesting to be seen ASAP as he wants to get the test done.     Advised patient of active imaging orders and to call central scheduling to schedule. Advised patient to schedule follow-up with Provider for after imaging is completed.

## 2025-05-02 NOTE — TELEPHONE ENCOUNTER
Message received from patient.    Patient does not see active order and is requesting if order from other Provider matters.    Noted active XR Lumbar order in chart was placed by NSGY PA.    Advised patient central scheduling should be able to see order when he schedules imaging.

## 2025-05-08 ENCOUNTER — PATIENT MESSAGE (OUTPATIENT)
Dept: CASE MANAGEMENT | Age: 30
End: 2025-05-08

## 2025-05-29 ENCOUNTER — OFFICE VISIT (OUTPATIENT)
Dept: SURGERY | Facility: CLINIC | Age: 30
End: 2025-05-29
Payer: MEDICAID

## 2025-05-29 VITALS
SYSTOLIC BLOOD PRESSURE: 149 MMHG | DIASTOLIC BLOOD PRESSURE: 83 MMHG | BODY MASS INDEX: 38.37 KG/M2 | HEART RATE: 120 BPM | WEIGHT: 299 LBS | HEIGHT: 74 IN

## 2025-05-29 DIAGNOSIS — M51.16 LUMBAR DISC HERNIATION WITH RADICULOPATHY: ICD-10-CM

## 2025-05-29 DIAGNOSIS — M51.26 LUMBAR DISC HERNIATION: ICD-10-CM

## 2025-05-29 DIAGNOSIS — M54.9 MUSCULOSKELETAL BACK PAIN: ICD-10-CM

## 2025-05-29 DIAGNOSIS — M47.816 LUMBAR SPONDYLOSIS: ICD-10-CM

## 2025-05-29 DIAGNOSIS — M51.362 DEGENERATION OF INTERVERTEBRAL DISC OF LUMBAR REGION WITH DISCOGENIC BACK PAIN AND LOWER EXTREMITY PAIN: ICD-10-CM

## 2025-05-29 DIAGNOSIS — M54.16 LUMBAR RADICULOPATHY: ICD-10-CM

## 2025-05-29 DIAGNOSIS — Z98.890 S/P LUMBAR MICRODISCECTOMY: Primary | ICD-10-CM

## 2025-05-29 DIAGNOSIS — M54.16 LUMBAR RADICULITIS: ICD-10-CM

## 2025-05-29 DIAGNOSIS — M21.70 LEG LENGTH DISCREPANCY: ICD-10-CM

## 2025-05-29 DIAGNOSIS — M48.061 STENOSIS OF LATERAL RECESS OF LUMBAR SPINE: ICD-10-CM

## 2025-05-29 DIAGNOSIS — M54.50 ACUTE LEFT-SIDED LOW BACK PAIN WITHOUT SCIATICA: ICD-10-CM

## 2025-05-29 DIAGNOSIS — M54.42 ACUTE LEFT-SIDED LOW BACK PAIN WITH LEFT-SIDED SCIATICA: ICD-10-CM

## 2025-05-29 PROCEDURE — 99214 OFFICE O/P EST MOD 30 MIN: CPT | Performed by: STUDENT IN AN ORGANIZED HEALTH CARE EDUCATION/TRAINING PROGRAM

## 2025-05-29 RX ORDER — METHOCARBAMOL 750 MG/1
750 TABLET, FILM COATED ORAL NIGHTLY PRN
Qty: 30 TABLET | Refills: 0 | Status: SHIPPED | OUTPATIENT
Start: 2025-05-29

## 2025-05-29 NOTE — PROGRESS NOTES
[facility]  Neurological Surgery Established Clinic Note    Samuel Hill  5/13/1995  [id number]  PCP: Leo Zhou MD  Referring Provider: Kyaw Schneider MD    REASON FOR VISIT:  Post-operative low back muscle pain after L4-5 microdiscectomy    NEUROSURGICAL PROCEDURES TO DATE:  1/15/2025 - MIS L4-5 laminal foraminotomies and microdiscectomy portion from the left    HISTORY OF PRESENT ILLNESS 11/25/2024:  Samuel Hill is a(n) 30 year old male with a history of low back pain radiating down the left leg, worsening over the past six months following a gym injury. He reports persistent severe pain, described as shooting from his left hamstring to his knee and wrapping around the left side of his leg, sometimes feeling like a high ankle sprain. He also experiences numbness in his toes and weakness in the leg. The pain is rated at 10/10 and is not relieved with hydrocodone (Norco), which he takes to manage pain and continue working. He notes that the pain is worse with sitting and lying down and slightly improves with activity. Previous epidural steroid injections provided only minimal and transient relief. He expresses a desire to proceed with surgical intervention to alleviate his symptoms and reduce dependence on medications.    INTERVAL HISTORY 1/2/2025:  Mr. Hill returns today for continued preoperative discussions in anticipation of his scheduled minimally invasive L4-5 microdiscectomy on 1/15/2025. He reports persistent low back pain radiating down the left leg, unchanged since his last visit, and describes ongoing discomfort despite his current pain regimen (acetaminophen). He is notably concerned about gastrointestinal side effects related to pain medications due to a history of stomach ulcers. He remains motivated for surgical intervention and plans to obtain formal medical clearance from his primary care provider (PCP) immediately following this  visit. No new neurological deficits or red-flag symptoms are reported.    INTERVAL HISTORY 2/12/2025 (from Ivan Collier PA-C):  Samuel Hill returns to the neurosurgery clinic today approximately 4 weeks s/p MIS bilateral L4-5 laminal foraminotomies, microdiscectomy, approaching from the left, with Dr. Crawford on 1/15/2025.  The patient is recovering well from a surgical standpoint.  He continues to endorse low back pain that is well-controlled with medications.  He is taking Tylenol, Robaxin, and oxycodone.  He notes some intermittent pain in his left lower extremity, but states that it is not as severe as it was preoperatively.  No right lower extremity complaints.  No lower extremity weakness.  Denies any issues with his incision site.  No constitutional symptoms or symptoms concerning for sepsis.     INTERVAL HISTORY 2/26/2025 (from Ivan Collier PA-C):  Samuel Hill continues to recover well from a surgical standpoint.  He notes intermittent low back pain that is fairly well-controlled with medications.  He notes that his left side hurts more when it is cold and his right side hurts more when it is warm.  At night, he notes tightness in his legs that began in his thighs and radiates superiorly to his back.  He states that this causes him to lose sleep.  He otherwise has no new neurologic complaints.  Denies any issues with his incision site.  Unfortunately, he feels that he developed a cold.  He reports that his whole family is currently sick.  He endorses constitutional symptoms and is planning to go to the urgent care today.     INTERVAL HISTORY 5/29/2025:  Samuel Hill returns four months following his 1/15/2025 MIS left-sided L4-5 microdiscectomy. He reports complete resolution of pre-operative sciatica and leg numbness but now experiences focal soreness over the left paraspinal muscles that flares with rainy weather, occasionally producing severe nocturnal spasms and visible swelling  into the buttock and posterolateral thigh. He denies new radiating pain, weakness, bowel/bladder symptoms, or incision problems. He has self-discontinued oxycodone and hydrocodone to avoid dependence; current analgesia is acetaminophen and intermittent methocarbamol left over from the immediate post-op period. He requests a “lower” breakthrough option and guidance on safe return to exercise. He inquires about massage, physical therapy, and possible leg-length discrepancy contributing to muscle strain.    PAST MEDICAL HISTORY:  Past Medical History:    Anxiety    Back pain    Back problem    Depression    History of stomach ulcers    Hx of motion sickness     PAST SURGICAL HISTORY:  Past Surgical History:   Procedure Laterality Date    Adenoidectomy      Appendectomy  03/2023    Appendectomy      Create eardrum opening,gen anesth      Other      ear tubes    Removal adenoids,primary,12+ y/o      Removal of tonsils,12+ y/o      and ademoids    Tonsillectomy       FAMILY HISTORY:  family history includes Arthritis in his mother; Diabetes in his maternal grandfather and paternal grandmother; Heart Disorder in his maternal grandfather and maternal grandmother.    SOCIAL HISTORY:   reports that he has never smoked. He has been exposed to tobacco smoke. He has never used smokeless tobacco. He reports current alcohol use. He reports current drug use. Frequency: 70.00 times per week. Drug: Cannabis.    ALLERGIES:  Allergies[1]    MEDICATIONS:  Medications Ordered Prior to Encounter[2]    REVIEW OF SYSTEMS:  All other systems were reviewed and were negative except for those previously mentioned in the HPI    PHYSICAL EXAMINATION:  General: No acute distress.  Respiratory: Non-labored respirations bilaterally. No audible wheezing  Cardiovascular: Extremities warm and well-perfused.  Abdomen: Soft, nontender, nondistended.   Musculoskeletal: Moves all extremities well, symmetrically.  Extremities: No edema.    NEUROLOGIC  EXAMINATION:  Mental status: Alert and oriented x 3  Speech: Clear, fluent  Cranial nerves: PERRLA, EOMI, face symmetric, with normal strength and sensation, tongue and palate midline, SCM 5/5 bilaterally  Motor:     RIGHT  Delt 5/5   Bic 5/5  Tri 5/5   HI 5/5    5/5  IP 5/5   Quad 5/5   Ham 5/5   AT 5/5   EHL 5/5 Pamela 5/5     LEFT    Delt 5/5   Bic 5/5  Tri 5/5   HI 5/5    5/5  IP 5/5   Quad 5/5   Ham 5/5   AT 5/5   EHL 5/5 Pamela 5/5   No pronator drift  Tone: Normal  Atrophy/Fasciculations: None  Sensation: Normal to light touch, symmetric, no neglect  Cerebellar: Normal finger nose finger  Gait: Normal, nondistressed heel toe tandem gait      Reflexes: 2+ throughout, symmetric, no Mor's    IMAGING:  MR lumbar 9/20/2024: Right paracentral L4-5 disc herniation leading to right lateral recess stenosis and encroachment on the traversing L5 nerve root.  There is facet fluid and widening on the right at the same level.    CT lumbar 9/17/2024: No acute fractures or subluxations.  Mild degenerative changes throughout.  XR lumbar flexion-extension 5/2/2025: Normal alignment without instability; mild disc-space narrowing at L4-5 consistent with expected post-discectomy degeneration; no pars defects or subluxation.    ASSESSMENT:  Mr. Hill is a 30-year-old male, four months status-post MIS left L4-5 laminoforaminotomies and microdiscectomy, with excellent resolution of pre-operative radiculopathy and imaging that shows no structural complication. His current symptoms represent postoperative paraspinal myofascial pain likely exacerbated by muscular deconditioning and a possible mild leg-length discrepancy; prognosis for full functional recovery is good with targeted rehabilitation and conservative measures.    PLAN:  - Initiate formal lumbar physical therapy focusing on core stabilization, paraspinal stretching, and gait assessment.  - Prescribe methocarbamol 750 mg PO every 8 hours as needed for muscle  spasm (30 tablets, no refills).  - Advise progressive return to lifting, starting <=30 lb and increasing as tolerated, and encourage low-impact cardiovascular exercise and hill walking.  - Recommend quality supportive footwear and schedule standing pelvic/hip AP radiograph for leg-length evaluation; consider orthotics referral if discrepancy confirmed.  - Educated that opioids provide limited benefit for muscular pain and are not indicated; continue acetaminophen up to 3 g/day for analgesia.  - Follow up in clinic in 6 weeks or sooner for worsening pain, new neurological deficits, or concerns.    Kel Crawford MD  Neurological Surgery    [facility] [place]  [address], [address]  [place] [address]  [phone]  Pager [phone]  5/29/2025 11:55 AM      This note was created using a voice-recognition transcribing system. Incorrect words or phrases may have been missed during proofreading. Please interpret accordingly.    Total Time    Established Patient Total Time       30  minutes.      Activities       Preparing to see the patient (chart/tests/imaging review).       Obtaining and/or reviewing separately obtained history.       Performing a medically appropriate examination and/or evaluation.       Counseling and educating the patient/family/caregiver.       Ordering medications, tests, or procedures.       Referring and communicating with other health care professionals (when not separately reported).       Documenting clincal information in the electronic or other health record.       Independently interpreting results (not separately reported).    Communicating results to the patient/family/caregiver.    Care coordination (not separately reported).         [1] No Known Allergies  [2]   Current Outpatient Medications on File Prior to Visit   Medication Sig Dispense Refill    pregabalin 100 MG Oral Cap Take 1 capsule (100 mg total) by mouth 2 (two) times daily. 60  capsule 1    methylPREDNISolone (MEDROL) 4 MG Oral Tablet Therapy Pack Take as directed 21 tablet 0    methocarbamol 500 MG Oral Tab Take 1 tablet (500 mg total) by mouth 3 (three) times daily as needed. 60 tablet 0    acetaminophen 500 MG Oral Tab Take 1 tablet (500 mg total) by mouth every 4 (four) hours as needed. 120 tablet 0    Multiple Vitamin (ONE-DAILY MULTI VITAMINS) Oral Tab Take 1 tablet by mouth daily.      Omega-3 Fatty Acids (FISH OIL OR) Take 1 capsule by mouth daily.      alprazolam 2 MG Oral Tab Take 1 tablet (2 mg total) by mouth As Directed.       No current facility-administered medications on file prior to visit.

## 2025-05-29 NOTE — PROGRESS NOTES
Patient presents 4 months s/p Minimally invasive bilateral Lumbar 4 to 5 laminoforaminotomies, microdiscectomy, approaching from the left surgery completed on 01/15/2025 by Dr. Crawford.    Today's visit: Left-sided low back pain that does not radiate down the leg; his legs feel much better he states. New xray with flex ext view completed on 5/2/25. CT scan that was ordered was denied by insurance.     Meds: Norco, Oxycodone     The following individual(s) verbally consented to be recorded using ambient AI listening technology and understand that they can each withdraw their consent to this listening technology at any point by asking the clinician to turn off or pause the recording:    Patient name:  VENESSA Hill

## 2025-06-19 ENCOUNTER — HOSPITAL ENCOUNTER (EMERGENCY)
Age: 30
Discharge: HOME | End: 2025-06-19
Payer: MEDICAID

## 2025-06-20 NOTE — ED QUICK NOTES
Patient arrives to ED accompanied by  Stephanie Curiel # [id number], from [facility].   noted above requesting DUI test at this time. Patient declines physical examination by ED Physician.     DUI specimen collection explained with patient verbalizing understanding.  Sealed kit  Lot # [id number] and Expiration Date 2/28/26 opened in front of patient and  above at bedside. Contents completed per hospital policy.    Urine specimen obtained;  nurse present during specimen collection.      Blood Specimen obtained;   Moisés present during procedure. right antecubital fossa prepped with Betadine.  Blood specimen collected via butterfly needle.  Patient tolerated procedure well.      DUI kit sealed and given to Officer , Moisés Brown # [id number], from [facility].         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

## 2025-06-25 ENCOUNTER — TELEPHONE (OUTPATIENT)
Dept: SURGERY | Facility: CLINIC | Age: 30
End: 2025-06-25

## 2025-06-25 RX ORDER — METHOCARBAMOL 750 MG/1
750 TABLET, FILM COATED ORAL NIGHTLY
Qty: 30 TABLET | Refills: 0 | Status: SHIPPED | OUTPATIENT
Start: 2025-06-25

## 2025-06-25 NOTE — TELEPHONE ENCOUNTER
Message below noted.    Patient was using the elliptical and now has complaints of hip and back pain. Patient states that his left hip is tight and has been radiating to his left hand causing hand numbness. Patient has been struggling to walk and has been unable to walk due to symptoms. Patient rates pain 11/10. Nothing makes pain better. Patient states ice would help cool it down, but now nothing is helping.     Patient states Dr. Crawford advised it was okay for him to start working out. Patient states he mainly has been weight lifting, but attempted to elliptical for change in exercise.    Patient states was supposed to get CT, but insurance kept denying. He is not sure if he should be seen in office for an evaluation as well as obtain any new imaging.     Discussed with patient if he has tried his Methocarbamol. Patient states he has not had a prescription in a while and requesting if he can get another one to see if that helps.     Medication: methocarbamol 750 MG Oral Tab      Date of last refill: 5/29/25 (#30/0)  [place]      Due back to clinic per last office note:  6 weeks  Date next office visit scheduled:    No future appointments.     LOV 5/29/25  \"ASSESSMENT:  Mr. Hill is a 30-year-old male, four months status-post MIS left L4-5 laminoforaminotomies and microdiscectomy, with excellent resolution of pre-operative radiculopathy and imaging that shows no structural complication. His current symptoms represent postoperative paraspinal myofascial pain likely exacerbated by muscular deconditioning and a possible mild leg-length discrepancy; prognosis for full functional recovery is good with targeted rehabilitation and conservative measures.     PLAN:  - Initiate formal lumbar physical therapy focusing on core stabilization, paraspinal stretching, and gait assessment.  - Prescribe methocarbamol 750 mg PO every 8 hours as needed for muscle spasm (30 tablets, no  refills).  - Advise progressive return to lifting, starting <=30 lb and increasing as tolerated, and encourage low-impact cardiovascular exercise and hill walking.  - Recommend quality supportive footwear and schedule standing pelvic/hip AP radiograph for leg-length evaluation; consider orthotics referral if discrepancy confirmed.  - Educated that opioids provide limited benefit for muscular pain and are not indicated; continue acetaminophen up to 3 g/day for analgesia.  - Follow up in clinic in 6 weeks or sooner for worsening pain, new neurological deficits, or concerns.\"    Advised we would discuss with the Provider and reach back out. Patient prefers [employer] message for response.     Patient acknowledged and appreciative of message.    Routed to Provider.

## 2025-06-25 NOTE — TELEPHONE ENCOUNTER
Patient stated he used the elliptical and now is experiencing back and hip pain that feels tight and patient stated can barely walk    Patient is also experiencing rt-sided hand swelling and pink (rt) numbness    No future appointments.    Routed to clinical staff

## 2025-06-25 NOTE — TELEPHONE ENCOUNTER
Okay for Robaxin refill.  He needs to see physical therapy for formal evaluation and exercise recommendations.  He also needs to complete the ordered XR scoliosis and leg length surveys.  If there is a discrepancy, activity will make his symptoms worse.  He should continue with OTC pain medications as well.

## 2025-06-25 NOTE — TELEPHONE ENCOUNTER
Message below noted.    Advised patient of message and to reach back out with any other questions or concerns.    Per LOV note 5/29/25  patient to follow-up 6 weeks. Advised patient to call our office to schedule an appointment for the week of July 7th or later. Patient will need to complete imaging prior to appointment.     Patient requested [employer] message.  message sent.

## 2025-07-18 ENCOUNTER — PATIENT MESSAGE (OUTPATIENT)
Dept: CASE MANAGEMENT | Age: 30
End: 2025-07-18

## (undated) DEVICE — PRECISION MATCH HEAD

## (undated) DEVICE — CONTAINER,SPECIMEN,OR STERILE,4OZ: Brand: MEDLINE

## (undated) DEVICE — GLOVE SURG SENSICARE SZ 7-1/2

## (undated) DEVICE — PACK,UNIVERSAL,NO GOWNS: Brand: MEDLINE

## (undated) DEVICE — SYRINGE 10ML CNTRL CONC TIP PYROGEN FREE DEHP-FR STRL MED LF

## (undated) DEVICE — Device

## (undated) DEVICE — SYRINGE 50ML GRAD N-PYRG DEHP-FR PVC FREE STRL MED LF DISP

## (undated) DEVICE — TUBING INSFL PNEUMOCLEAR SET HFL

## (undated) DEVICE — SPK10329 JACKSON KIT: Brand: SPK10329 JACKSON KIT

## (undated) DEVICE — NEEDLE HPO 16GA 1.5IN REG WALL REG BVL LL HUB DEHP-FR STRL

## (undated) DEVICE — ELECTRODE PT RTN C30- LB 9FT CORD NONIRRITATE NONSENSITIZE

## (undated) DEVICE — NEEDLE SPINAL 22X5 405148

## (undated) DEVICE — BLADE SURG 11 STRL PRSNA + PLMR

## (undated) DEVICE — INSULATED BLADE ELECTRODE;CAUTION: FOR MANUFACTURING, PROCESSING, OR REPACKING.: Brand: EDGE

## (undated) DEVICE — PEN 6" SURGICAL MARKING PURPL

## (undated) DEVICE — POUCH SPEC RTRVL 34.5CM 29.5CM 10MM ERG HNDL LONG CYL TUBE

## (undated) DEVICE — PACK CDS LAMINECTOMY

## (undated) DEVICE — PAD N ADH 3X4IN COT POLY SFT PERF FLM

## (undated) DEVICE — AVANOS* TUOHY EPIDURAL NEEDLE: Brand: AVANOS

## (undated) DEVICE — SOLUTION ANTIFOG ISOPRPNL NTOX NFLMB NDL CNTR DEVON DFGR

## (undated) DEVICE — KIT HEMSTAT MTRX 8ML PORCINE GEL HUM THROM

## (undated) DEVICE — PAIN TRAY: Brand: MEDLINE INDUSTRIES, INC.

## (undated) DEVICE — SYRINGE MED 10ML LL TIP W/O SFTY DISP

## (undated) DEVICE — ADHESIVE LIQ 2/3ML VI MASTISOL

## (undated) DEVICE — ZZ-DISC NO SUB - NEEDLE HYPODERMIC 27GA L1.25IN REGULAR BEVEL WALL SPECIALTY DISPOSABLE PRECISIONGLI

## (undated) DEVICE — TROCAR LAPSCP STD 100MM 5MM VERSAONE FX CANNULA BLADE STRL

## (undated) DEVICE — NEEDLE SPNL 22GA L3.5IN BLK QNCKE STYL DISP

## (undated) DEVICE — SHEET,DRAPE,53X77,STERILE: Brand: MEDLINE

## (undated) DEVICE — REMOVER DURAPREP 3M

## (undated) DEVICE — NEEDLE HYPO 16GA L1.5IN PUR REG BVL WRLD

## (undated) DEVICE — BANDAID COVERLET 1X3

## (undated) DEVICE — PENCIL ES BTTN SWCH W/ TIP HOLSTER E-Z CLN

## (undated) DEVICE — UNDYED BRAIDED (POLYGLACTIN 910), SYNTHETIC ABSORBABLE SUTURE: Brand: COATED VICRYL

## (undated) DEVICE — APPLICATOR 26ML BD CHLRPRP PREP STRL CLR

## (undated) DEVICE — SET EXTN 2.7ML TBNG L20IN DIA0.100IN MACBOR

## (undated) DEVICE — SUTURE VICRYL MTPS 4-0 PS2 27IN BRAID COAT ABS UNDYED J426H

## (undated) DEVICE — NEEDLE HPO 25GA 1.5IN REG WALL REG BVL LL HUB DEHP-FR STRL

## (undated) DEVICE — ADHESIVE PREMIERPRO EXOFIN 1ML HVISC TUBE SOFT FLXB APL

## (undated) DEVICE — GLOVE SUR 8 SENSICARE PI MIC PIP CRM PWD F

## (undated) DEVICE — SLIM BODY SKIN STAPLER: Brand: APPOSE ULC

## (undated) DEVICE — SODIUM CHL 0.9% IRRIG.

## (undated) DEVICE — SHEARS ESURG 36CM 5MM HARMONIC ACE+ CRV TI 2 HNDCNTL BTN TPR

## (undated) DEVICE — UNIVERSAL BLOCK TRAY: Brand: MEDLINE INDUSTRIES, INC.

## (undated) DEVICE — GLOVE SURG SENSICARE SZ 6-1/2

## (undated) DEVICE — MARKER SKIN 2 TIP

## (undated) DEVICE — TROCAR LAPSCP STD 100MM 12MM VERSAONE THRD ANCH BLUNT TIP

## (undated) DEVICE — GOWN SURG LG L3 NONREINFORCE SET IN SLV STRL LF DISP BLUE

## (undated) DEVICE — SOLUTION IRR 1000ML 0.9% NACL PLASTIC POUR BTL ISTNC N-PYRG

## (undated) DEVICE — GLOVE SUR 8 SENSICARE NEOPR PWD F

## (undated) DEVICE — GLOVE SURG SENSICARE SZ 7

## (undated) DEVICE — SUT COAT VCRL+ 3-0 18IN CP-2 ABSRB UD ANTIBAC

## (undated) DEVICE — C-ARMOR C-ARM EQUIPMENT COVERS CLEAR STERILE UNIVERSAL FIT 12 PER CASE: Brand: C-ARMOR

## (undated) DEVICE — FRAZIER SUCTION INSTRUMENT 12 FR W/CONTROL VENT & OBTURATOR: Brand: FRAZIER

## (undated) DEVICE — SUT COAT VCRL+ 1 18IN CTX ABSRB VLT ANTIBACT

## (undated) DEVICE — GLOVE SURG 7.5 PREMIERPRO LF NATURAL PF TXTR SMTH STRL

## (undated) DEVICE — SUTURE VICRYL 0 UR-6 27IN BRAID COAT ABS VIOL J603H

## (undated) DEVICE — GLOVE SUR 7.5 SENSICARE PI MIC PIP CRM PWD F

## (undated) DEVICE — GLOVE SURG 7 PREMIERPRO LF NATURAL PF TXTR SMTH STRL

## (undated) DEVICE — GAMMEX® PI HYBRID SIZE 8, STERILE POWDER-FREE SURGICAL GLOVE, POLYISOPRENE AND NEOPRENE BLEND: Brand: GAMMEX

## (undated) DEVICE — GLOVE SURG 6.5 PREMIERPRO LF NATURAL PF TXTR SMTH STRL

## (undated) DEVICE — INSULATED BLADE ELECTRODE: Brand: EDGE

## (undated) DEVICE — SPONGE 4X4 10PK

## (undated) DEVICE — STAPLER INTERNAL 28CM 60MM ECHELON FLEX 45MM

## (undated) NOTE — LETTER
April 10, 2017    Patient: Sagar Maxwell   Date of Visit: 4/10/2017       To Whom It May Concern:    Justyna Young was seen and treated in our emergency department on 4/10/2017. He should not return to work until 4/12/2017.     If you have any

## (undated) NOTE — ED AVS SNAPSHOT
Treva Miller   MRN: [id number]    Department:  THE [facility] Emergency Department in [place]   Date of Visit:  12/14/2017           Disclosure     Insurance plans vary and the physician(s) referred by the ER may not be covered by your plan.  Please c tell this physician (or your personal doctor if your instructions are to return to your personal doctor) about any new or lasting problems. The primary care or specialist physician will see patients referred from the [facility] Emergency Department.  Primo Montes

## (undated) NOTE — ED AVS SNAPSHOT
THE [facility] Emergency Department in [address]    Phone:  [phone]    Fax:  [place]   MRN: [id number]    Department:  THE [facility] Emergency Department in [place]   Date of Vi IF THERE IS ANY CHANGE OR WORSENING OF YOUR CONDITION, CALL YOUR PRIMARY CARE PHYSICIAN AT ONCE OR RETURN IMMEDIATELY TO THE EMERGENCY DEPARTMENT.     If you have been prescribed any medication(s), please fill your prescription right away and begin taking t

## (undated) NOTE — LETTER
Date & Time: 1/14/2019, 5:25 PM  Patient: Sagar Maxwell  Encounter Provider(s): Isaiah Klein MD       To Whom It May Concern:    Justyna Young was seen and treated in our department on 1/14/2019. He can return to work.     If you h

## (undated) NOTE — ED AVS SNAPSHOT
[facility] in [facility] [address] [facility]    Phone:  [phone]    [facility]  [facility]   MRN: [id number]    Department:  [facility] in [place]   Date of Vi IF THERE IS ANY CHANGE OR WORSENING OF YOUR CONDITION, CALL YOUR PRIMARY CARE PHYSICIAN AT ONCE OR RETURN IMMEDIATELY TO THE EMERGENCY DEPARTMENT.     If you have been prescribed any medication(s), please fill your prescription right away and begin taking t

## (undated) NOTE — LETTER
24  RE: Samuel Hill     : 1995    Dear Dr. Zhou,    This letter is to inform you that your patient has been scheduled for surgery with Dr. Crawford on 01/15/2025 at [facility]. Pre-operative clearance has been requested within 30 days of surgery.  We have asked the patient to contact your office to schedule a pre-operative visit.     Diagnosis: Lumbar disc herniation   Procedure: L4-5 minimally invasive discectomy, approaching from the left      A Pre-operative History & Physical is needed for medical clearance within 30 days of surgery. Please address patient's active problems and potential risks of having surgery considering their medical history.  Pre-op labs are scheduled through the [facility] department. If any labs/testing are being done through the PCP office, then results should be faxed to the pre-admission testing department at [facility] at [phone]. Our pre-operative lab orders are located in our surgery order, if the patient would like these done through your office, you will need to place separate orders.     Please fax clearance letter/office visit note to our office at fax #: [phone]. Your office note must clearly indicate if the patient is medically cleared for surgery or not.    The following orders will be placed by pre-admission testing:  CBC  CMP  Type and Screen   PT/PTT/INR  MRSA/MSSA Nasal Swab  Chest X-ray  EKG  (*And any other pertinent testing based on patient's current clinical condition.)    If you have any questions, you may contact our office at [phone], option # 2.    Thank you,   [facility] Staff

## (undated) NOTE — LETTER
March 14, 2017    Patient: Ginny De Anda   Date of Visit: 3/14/2017       To Whom It May Concern:    Beverly Roblero was seen and treated in our emergency department on 3/14/2017. He should not return to work until 3/15/2017.     If you have any

## (undated) NOTE — LETTER
10/07/24           A Sergio  :  1995      To Whom It May Concern:    This patient was seen in our office on 2024 and has a procedure done on 10/4/2024. Considering the patient's symptoms and severe pain, he can remain off work until further evaluation in 2 weeks.     If this office may be of further assistance, please do not hesitate to contact us.      Sincerely,        Kyaw Schneider MD

## (undated) NOTE — ED AVS SNAPSHOT
Flower Paulson   MRN: [id number]    Department:  [facility] Emergency Department in [place]   Date of Visit:  1/14/2019           Disclosure     Insurance plans vary and the physician(s) referred by the ER may not be covered by your plan.  Please co tell this physician (or your personal doctor if your instructions are to return to your personal doctor) about any new or lasting problems. The primary care or specialist physician will see patients referred from the [facility] Emergency Department.  Rafael Louie

## (undated) NOTE — LETTER
OPIOID TREATMENT AGREEMENT    For Pain Management      Please read each statement, initial at the bottom of each page, and sign the last page to indicate your agreement with this form. If you have any questions about any information in this form or the opioid treatment plan, please request immediate clarification from your physician or health care provider.     The purpose of this agreement is to give you information about the medications you will be taking for pain management and to assure that you and your physician/health care provider comply with state and federal regulations concerning the prescribing of controlled substances. A trial of opioid therapy can be considered for moderate to severe pain with the intent of reducing pain and increasing function. The physician’s goal is for you to have the best quality of life possible given the reality of your clinical condition. The success of treatment depends on mutual trust and honesty in the physician/patient relationship and full agreement and understanding of the risks and benefits of using opioids to treat pain.    I agree to use opioids (morphine-like drugs) as part of my treatment for chronic pain.  I understand that these drugs can be effective, but have a high potential for misuse and are therefore closely controlled by the local, state, and federal government. I understand that my physician/health care provider is prescribing such medication to help manage my pain, and I agree to the following conditions as part of my treatment plan    I am responsible for my pain medications.  I agree to take the medication only as prescribed.    I understand that increasing my dose without the close supervision of my physician could    lead to drug overdose causing severe sedation and respiratory depression and death.    I understand that decreasing or stopping my medication without the close supervision of my physician can lead to withdrawal. Withdrawal symptoms can  include yawning, sweating, watery eyes, runny nose, anxiety, tremors, aching muscles, hot and cold flashes, “goose bumps”, abdominal cramps, and diarrhea.  These symptoms can occur 24-48 hours after the last dose and can last up to 3 weeks.    I will not request or accept controlled substance medication from any other physician or individual while I am receiving such medication from my physician/health care provider at the clinic.    I acknowledge that there are side effects with opioid therapy, and I understand it is my responsibility to notify my physician/health care provider for any side effect that continue or are severe (i.e., difficulty breathing, slow heart rate, sedation, confusion).  I am also responsible for notifying my pain physician immediately if I need to visit another physician or need to visit an emergency room due to pain, of if I become pregnant.    I understand that the opioid medication is strictly for my own use and I agree not to give or sell my medication to others because it may endanger another person’s health and is against the law.    I will inform my physician of all medications I am taking, including herbal remedies.  Medications like Valium or Ativan; sedatives such as Soma, Xanax, Fiorinal; antihistamines like Benadryl; herbal remedies, alcohol, and cough syrup containing alcohol, codeine, or hydrocodone can interact with opioids and produce serious side effects.    I understand I will be expected to return to the clinic as instructed by my provider during the time any of my medication is being adjusted.    I understand that any evidence of drug hoarding, acquisition of any opioid medication or adjunctive analgesia from other physicians (which includes emergency rooms), uncontrolled dose escalation or reduction, loss of prescriptions or failure to follow agreement may result in change to the treatment plan, referral to the Medication Assisted Therapy Program, an may result in  termination of the physician/patient relationship.    I will not use any illicit substances, such as cocaine, marijuana, etc. while taking these medications.  I understand that use of any illicit substances while taking these medications may result in a change to my treatment plan, referral to the Medication Assisted Therapy Clinic, and may result in termination of the physician/patient relationship.    I understand that I should not consume alcohol while taking these medications because the use of alcohol together with opioid medications is warned against.    I am responsible for my opioid prescriptions.  I understand that:    Refill prescriptions can be written for a one month supply and increased to a maximum of two months at the discretion of the provider.    It is my responsibility to schedule appointments for the next opioid refill to ensure I do not run out of medications.    I am responsible for keeping my prescriptions and pain medications in a safe and secure place, such as a locked cabinet or safe.  I am expected to protect my medications from loss or theft.  I am responsible for taking the medication in the dose prescribed and for keeping track of the amount remaining.  If my medication is stolen, I will report this to my local police department and obtain a stolen item report.  I will then report the stolen medication to my physician.  If my medications are lost, misplaced, or stolen, my physician may choose not to replace the medications.    Refills issued by physicians/health care providers in this clinic can only be filled by a pharmacy in the State of [place], even if I am a resident of another state.    Prescriptions for pain medicine or any other prescriptions will be written only during an office visit or during regular office hours.  No refills of any medications during the evening or on weekends.    I must bring back all opioid medications and adjunctive medications prescribed by my physician  in the original containers/bottles at every visit.    Prescriptions will not be written in advance due to vacations, meetings, or other commitments.    If an appointment for a prescription refill is missed, another appointment will be made as soon as possible.  Immediate or emergency appointments will not be possible.    I understand while physical dependence is to be expected after long-term use of opioids, signs of addiction, abuse, or misuse shall prompt the need for substance dependence treatment as well as weaning and detoxification from the opioids.  I further understand the following:    Physical dependence is common to many drugs such as blood pressure medications, anti-seizure medications, and opioids.  It results in biochemical changes such that abruptly stopping these drugs will cause a withdrawal response.  It should be noted that physical dependence does not equal addiction.  A person can be dependent on insulin to treat diabetes or dependent on prednisone (steroids) to treat asthma, but not addicted to the insulin or prednisone.    Addiction is a primary, chronic neurobiologic disease with genetic, psychosocial and environmental factors influencing its development and manifestation.  It is characterized by behavior that includes one or more of the following: impaired control over drug use, compulsive use, continued use despite harm, and cravings.  This means the drug decreases a person’s quality of life.  If a patient exhibits such behavior, the drug will be tapered and the patient will not be a candidate for an opioid trial.  He/she will be referred to an addiction medicine specialist.    Tolerance means a state of adaption in which exposure to the drug induces changes that result in a lessening of one or more of the drug’s effects over time.  The dose of the opioid may have to be adjusted up or down to a dose that produces maximum function and a realistic decrease of the patient’s pain.    If it  appears to my physician/health care provider that there is no improvement in my daily function or quality of life from the controlled substance, I understand my opioids may be discontinued.    If I have a history of alcohol or drug misuse/addiction, I must notify the physician of such history since the treatment with opioids for may increase the possibility of relapse.    I agree and understand that my physician reserves the right to perform random or unannounced urine drug testing.  If requested to provide a urine sample, I agree to cooperate.  If I decide not to provide a urine sample, I understand that my physician may change my treatment plan, including discontinuation of my opioid medications when applicable or complete termination of the physician/patient relationship.  The presence of non-prescribed drug(s) or illicit drug(s) in the urine can be grounds for termination of the physician/patient relationship.  Urine drug testing is not forensic testing, but is done for my benefit as a diagnostic tool and in accordance with certain legal and regulatory guidance on the use of controlled substances to treat pain.    I agree to allow my physician/kusum care provider to contact any health care professional, including behavioral health, family member, pharmacy, legal authority, or regulator agency to obtain or provide information about my care or actions if the physician feels it is necessary.    I understand that non-compliance with the above conditions may result in a re-evaluation of my treatment plan, referral to the Medication Assisted Therapy Clinic, discontinuation of opioid therapy, and possible discharge from the clinic.    I agree to work closely with my physician/health care provider to assure the agreed plan of care is followed.    I acknowledge that I have received a copy of this agreement for my records.          I __________________________________________ have read the above information or it has been  read to me.  All my questions regarding the treatment of pain with opioids have been answered to my satisfaction, and I understand all of the conditions of my participation in the opioid treatment plan listed above.  I hereby agree to the conditions listed about and consent to participate in the opioid medication therapy.        ______________________________    ____________    ______________________________              Patient Signature                                  Date                        Patient Printed Name  ______________________________    ____________    ______________________________              Witness Signature                                Date                       Witness Printed Name

## (undated) NOTE — ED AVS SNAPSHOT
THE [facility] Emergency Department in [address] [facility]    Phone:  [phone]    Fax:  [place]   MRN: [id number]    Department:  THE [facility] Emergency Department in [place]   Date of Vi coverage for follow-up care and referrals. [address] [phone]  Pediatric [phone] Emergency Department   [phone]       To Check ER Wait Times:  TEXT 'ERwait' to [id number]      Click [email or web address] will be contacted. Please make sure we have your correct phone number before you leave. After you leave, you should follow the attached instructions. I have read and understand the instructions given to me by my caregivers.         [facility] XR CHEST PA + LAT CHEST (CPT=71020) (Final result) Result time:  04/10/17 18:56:27    Final result    Impression:    CONCLUSION:  No acute disease. Dictated by: Carol Whipple MD on 4/10/2017 at 18:55       Approved by:  Carol Whipple MD

## (undated) NOTE — LETTER
[facility]  [address], [place], [place]    Authorization for Surgical Operation and Procedure                               I hereby authorize Kyaw Schneider MD, my physician and his/her assistants (if applicable), which may include medical students, residents, and/or fellows, to perform the following surgical operation/ procedure and administer such anesthesia as may be determined necessary by my physician: Operation/Procedure name (s) CAUDAL EPIDURAL STEROID INJECTION UNDER FLUOROSCOPY on [place]   2.   I recognize that during the surgical operation/procedure, unforeseen conditions may necessitate additional or different procedures than those listed above.  I, therefore, further authorize and request that the above-named surgeon, assistants, or designees perform such procedures as are, in their judgment, necessary and desirable.    3.   My surgeon/physician has discussed prior to my surgery the potential benefits, risks and side effects of this procedure; the likelihood of achieving goals; and potential problems that might occur during recuperation.  They also discussed reasonable alternatives to the procedure, including risks, benefits, and side effects related to the alternatives and risks related to not receiving this procedure.  I have had all my questions answered and I acknowledge that no guarantee has been made as to the result that may be obtained.    4.   Should the need arise during my operation/procedure, which includes change of level of care prior to discharge, I also consent to the administration of blood and/or blood products.  Further, I understand that despite careful testing and screening of blood or blood products by collecting agencies, I may still be subject to ill effects as a result of receiving a blood transfusion and/or blood products.  The following are some, but not all, of the potential risks that can occur: fever and allergic reactions, hemolytic  reactions, transmission of diseases such as Hepatitis, AIDS and Cytomegalovirus (CMV) and fluid overload.  In the event that I wish to have an autologous transfusion of my own blood, or a directed donor transfusion, I will discuss this with my physician.  Check only if Refusing Blood or Blood Products  I understand refusal of blood or blood products as deemed necessary by my physician may have serious consequences to my condition to include possible death. I hereby assume responsibility for my refusal and release the hospital, its personnel, and my physicians from any responsibility for the consequences of my refusal.    o  Refuse   5.   I authorize the use of any specimen, organs, tissues, body parts or foreign objects that may be removed from my body during the operation/procedure for diagnosis, research or teaching purposes and their subsequent disposal by hospital authorities.  I also authorize the release of specimen test results and/or written reports to my treating physician on the hospital medical staff or other referring or consulting physicians involved in my care, at the discretion of the Pathologist or my treating physician.    6.   I consent to the photographing or videotaping of the operations or procedures to be performed, including appropriate portions of my body for medical, scientific, or educational purposes, provided my identity is not revealed by the pictures or by descriptive texts accompanying them.  If the procedure has been photographed/videotaped, the surgeon will obtain the original picture, image, videotape or CD.  The hospital will not be responsible for storage, release or maintenance of the picture, image, tape or CD.    7.   I consent to the presence of a  or observers in the operating room as deemed necessary by my physician or their designees.    8.   I recognize that in the event my procedure results in extended X-Ray/fluoroscopy time, I may develop a skin  reaction.    9. If I have a Do Not Attempt Resuscitation (DNAR) order in place, that status will be suspended while in the operating room, procedural suite, and during the recovery period unless otherwise explicitly stated by me (or a person authorized to consent on my behalf). The surgeon or my attending physician will determine when the applicable recovery period ends for purposes of reinstating the DNAR order.  10. Patients having a sterilization procedure: I understand that if the procedure is successful the results will be permanent and it will therefore be impossible for me to inseminate, conceive, or bear children.  I also understand that the procedure is intended to result in sterility, although the result has not been guaranteed.   11. I acknowledge that my physician has explained sedation/analgesia administration to me including the risk and benefits I consent to the administration of sedation/analgesia as may be necessary or desirable in the judgment of my physician.    I CERTIFY THAT I HAVE READ AND FULLY UNDERSTAND THE ABOVE CONSENT TO OPERATION and/or OTHER PROCEDURE.     ____________________________________  _________________________________        ______________________________  Signature of Patient    Signature of Responsible Person                Printed Name of Responsible Person                                      ____________________________________  _____________________________                ________________________________  Signature of Witness        Date  Time         Relationship to Patient    STATEMENT OF PHYSICIAN My signature below affirms that prior to the time of the procedure; I have explained to the patient and/or his/her legal representative, the risks and benefits involved in the proposed treatment and any reasonable alternative to the proposed treatment. I have also explained the risks and benefits involved in refusal of the proposed treatment and alternatives to the proposed  treatment and have answered the patient's questions. If I have a significant financial interest in a co-management agreement or a significant financial interest in any product or implant, or other significant relationship used in this procedure/surgery, I have disclosed this and had a discussion with my patient.     _____________________________________________________              _____________________________  (Signature of Physician)                                                                                         (Date)                                   (Time)  Patient Name: Samuel CLIFFORD Sergio      : 1995      Printed: 10/1/2024     Medical Record #: [id number]                                      Page 1 of 1

## (undated) NOTE — ED AVS SNAPSHOT
THE [facility] Emergency Department in [address] [facility]    Phone:  [phone]    Fax:  [facility]   MRN: [id number]    Department:  THE [facility] Emergency Department in [place]   Date of Vi coverage for follow-up care and referrals. [address] [facility] [address] [phone]  Pediatric [phone] Emergency Department   [phone]       To Check ER Wait Times:  TEXT 'ERwait' to [id number]      Click [email or web address] will be contacted. Please make sure we have your correct phone number before you leave. After you leave, you should follow the attached instructions. I have read and understand the instructions given to me by my caregivers.         [facility] Sign up for You.it, your secure online medical record. [employer] will allow you to access patient instructions from your recent visit,  view other health information, and more. To sign up or find more information, go to [email or web address]

## (undated) NOTE — ED AVS SNAPSHOT
[facility] Emergency Department in [address] [facility]    Phone:  [phone]    Fax:  [facility]   MRN: [id number]    Department:  [facility] Emergency Department in [place]   Date of Vi IF THERE IS ANY CHANGE OR WORSENING OF YOUR CONDITION, CALL YOUR PRIMARY CARE PHYSICIAN AT ONCE OR RETURN IMMEDIATELY TO THE EMERGENCY DEPARTMENT.     If you have been prescribed any medication(s), please fill your prescription right away and begin taking t

## (undated) NOTE — LETTER
10/04/24          Samuel CLIFFORD Sergio  :  1995      To Whom It May Concern:    This patient was seen in our office on 10/04/24 .  Work status:  {DAVE RETURN TO WORK:4904}    May return to work status per above effective 10/5/2024.    If this office may be of further assistance, please do not hesitate to contact us.      Sincerely,      Kyaw Schneider MD

## (undated) NOTE — LETTER
WHERE IS YOUR PAIN NOW?  Audra the areas on your body where you feel the described sensations.  Use the appropriate symbol.  Audra the areas of radiation.  Include all affected areas.  Just to complete the picture, please draw in the face.     ACHE:  ^ ^ ^   NUMBNESS:  0000   PINS & NEEDLES:  = = = =                              ^ ^ ^                       0000              = = = =                                    ^ ^ ^                       0000            = = = =      BURNING:  XXXX   STABBING: ////                  XXXX                ////                         XXXX          ////     Please audra the line below indicating your degree of pain right now  with 0 being no pain 10 being the worst pain possible.                                         0             1             2              3             4              5              6              7             8             9             10         Patient Signature:

## (undated) NOTE — LETTER
December 15, 2017    Patient: Corita Severance   Date of Visit: 12/14/2017       To Whom It May Concern:    Collis Lundborg was seen and treated in our emergency department on 12/14/2017. He can return to work 12/15/2017  .     If you have any ques

## (undated) NOTE — ED AVS SNAPSHOT
THE [facility] Emergency Department in [address] [facility]    Phone:  [phone]    Fax:  [facility]   MRN: [id number]    Department:  THE [facility] Emergency Department in [place]   Date of Vi covered by your plan. Please contact your insurance company to determine coverage for follow-up care and referrals.     [address] [phone]  Pediatric [phone] Emergency Department   [phone]       To by a radiologist.  If there is a significant change in your reading, you will be contacted. Please make sure we have your correct phone number before you leave. After you leave, you should follow the attached instructions.      I have read and understand th [address] [address]. MyChart     Sign up for [employer], your secure online medical record. [email or web address] will allow you to access patient instructions from your recent visit,  view other health information, and more.  To sign up or find more information,

## (undated) NOTE — Clinical Note
Dear Rob,  I had the opportunity to see your patient Samuel Hill recently. I appreciate your confidence in me to care for your patients. Please feel free call me with any questions at [phone] or contact me through Epic.  Sincerely, Srinivas Schneider MD Board Certified, Physical Medicine and Rehabilitation Specializing in Sports Medicine, Spine Medicine and Electrodiagnostic Medicine [facility]  CC: Dr. Zhou

## (undated) NOTE — ED AVS SNAPSHOT
THE [facility] Emergency Department in [address] [facility]    Phone:  [phone]    Fax:  [facility]   MRN: [id number]    Department:  THE [facility] Emergency Department in [place]   Date of Vi IF THERE IS ANY CHANGE OR WORSENING OF YOUR CONDITION, CALL YOUR PRIMARY CARE PHYSICIAN AT ONCE OR RETURN IMMEDIATELY TO THE EMERGENCY DEPARTMENT.     If you have been prescribed any medication(s), please fill your prescription right away and begin taking t

## (undated) NOTE — ED AVS SNAPSHOT
[address] [facility] in [facility] [address] [facility]    Phone:  [phone]    Fax:  [facility]   MRN: [id number]    Department:  [address] [facility] in [place]   Date of Vi Click [email or web address]      Or call [phone]    If you have any problems with your follow-up, please call our  at [phone]    Si usted tiene algun problema con watson sequimiento, por favor llame a nuestro adminstrador de casos al (26 24[facility]        Pharmacy Address Phone Number   [facility] [address] [facility] ([address] [facility]) [address] [facility] [address] [place] [address] 289. ([address] [facility]) [address] [facility] TECHNIQUE:  Transabdominal gray scale ultrasound imaging of the bilateral kidneys and bladder was performed. Routine technique was utilized.        FINDINGS:    RIGHT KIDNEY:     MEASUREMENTS:  10.9 x 5.5 x 6.7 cm  ECHOGENICITY:  Normal.  HYDRONEPHROSIS: not sign up before the expiration date, you must request a new code. Your unique [employer] Access Code: [email or web address]  Expires: 2/20/2017  6:00 PM    If you have questions, you can call [phone] to talk to our [facility] Staff.  Remember, [employer]